# Patient Record
Sex: FEMALE | Race: WHITE
[De-identification: names, ages, dates, MRNs, and addresses within clinical notes are randomized per-mention and may not be internally consistent; named-entity substitution may affect disease eponyms.]

---

## 2022-09-28 ENCOUNTER — HOSPITAL ENCOUNTER (EMERGENCY)
Dept: HOSPITAL 97 - ER | Age: 87
Discharge: ANOTHER HOSPITAL | End: 2022-09-28
Payer: COMMERCIAL

## 2022-09-28 DIAGNOSIS — Z20.822: ICD-10-CM

## 2022-09-28 DIAGNOSIS — S42.352A: Primary | ICD-10-CM

## 2022-09-28 LAB
BUN BLD-MCNC: 23 MG/DL (ref 7–18)
GLUCOSE SERPLBLD-MCNC: 190 MG/DL (ref 74–106)
HCT VFR BLD CALC: 35.4 % (ref 36–45)
INR BLD: 1.07
LYMPHOCYTES # SPEC AUTO: 1.1 K/UL (ref 0.7–4.9)
MCV RBC: 94.7 FL (ref 80–100)
PMV BLD: 7.3 FL (ref 7.6–11.3)
POTASSIUM SERPL-SCNC: 3.9 MMOL/L (ref 3.5–5.1)
RBC # BLD: 3.73 M/UL (ref 3.86–4.86)

## 2022-09-28 PROCEDURE — 99285 EMERGENCY DEPT VISIT HI MDM: CPT

## 2022-09-28 PROCEDURE — 87811 SARS-COV-2 COVID19 W/OPTIC: CPT

## 2022-09-28 PROCEDURE — 85610 PROTHROMBIN TIME: CPT

## 2022-09-28 PROCEDURE — 73060 X-RAY EXAM OF HUMERUS: CPT

## 2022-09-28 PROCEDURE — 80048 BASIC METABOLIC PNL TOTAL CA: CPT

## 2022-09-28 PROCEDURE — 85025 COMPLETE CBC W/AUTO DIFF WBC: CPT

## 2022-09-28 PROCEDURE — 36415 COLL VENOUS BLD VENIPUNCTURE: CPT

## 2022-09-28 PROCEDURE — 85730 THROMBOPLASTIN TIME PARTIAL: CPT

## 2022-09-28 NOTE — XMS REPORT
Continuity of Care Document

                          Created on:2022



Patient:LY FRANCIS

Sex:Female

:1935

External Reference #:[id number]





Demographics







                          Address                   -



                                                    [place] [address]

 

                          Home Phone                [phone]

 

                          Work Phone                [phone]

 

                          Email Address             NONE

 

                          Preferred Language        English

 

                          Marital Status            Unknown

 

                          Judaism Affiliation     Unknown

 

                          Race                      Unknown

 

                          Additional Race(s)        White

 

                          Ethnic Group              Unknown









Author







                          Organization              [facility]

t

 

                          Address                   [address]



                                                    [place] [address]

 

                          Phone                     [phone]









Support







                Name            Relationship    Address         Phone

 

                MORGAN FRANCIS                 UNK             [phone]



                                                [place] [address] 

 

                MORGAN FRANCIS                 [address] [phone]



                                                [place] [address] 

 

                BOBBI FRANCIS     OtherRelationship Unavailable     Unavailable

 

                PENNY FRANCIS                    [phone]



                                                [place] [address] 

 

                MORGAN FRANCIS   Unavailable     [address] [facility] [phone]



                                                [place] [address] 









Care Team Providers







                    Name                Role                Phone

 

                    DR ROSA LONGORIA Primary Care Physician Unavailable

 

                    Roxane Julian      Attending Clinician Unavailable

 

                    SARI PINTO         Attending Clinician Unavailable

 

                    EDWIN VELEZ      Attending Clinician Unavailable

 

                    STACY CORBETT     Attending Clinician Unavailable

 

                    Sandoval Landin     Attending Clinician Unavailable

 

                    Arnulfo Gant  Attending Clinician Unavailable

 

                    DIOGO VANEGAS           Attending Clinician Unavailable

 

                    Octavio Gilliam    Attending Clinician Unavailable

 

                    Roxane Julian      Admitting Clinician Unavailable

 

                    ELAINE STALLWORTH       Admitting Clinician Unavailable

 

                    Physician, No Primary or Family Admitting Clinician UnavailDIOGO Haynes           Admitting Clinician Unavailable

 

                    Alberto Salomon PA-C Admitting Clinician Unavailable









Payers







           Payer Name Policy Type Policy Number Effective Date Expiration Date S

brenda

 

           [id number]                [id number]  1960 00:00:00            







Problems







       Condition Condition Condition Status Onset  Resolution Last   Treating Co

mments 

Source



       Name   Details Category        Date   Date   Treatment Clinician        



                                                 Date                 

 

       Acute  Acute  Diagnosis Active 2021                             [facility]



       Confusion Confusion               0-25                               [facility]

s



                                   00:00:                             [facility]



                                   00                                 l



                                                                      (LUF/LI



                                                                      V/SA)







Allergies, Adverse Reactions, Alerts







       Allergy Allergy Status Severity Reaction(s) Onset  Inactive Treating Comm

ents 

Source



       Name   Type                        Date   Date   Clinician        

 

       No Known DA     Active U             2021                      [facility]



       Allergie                             2-13                        [facility]



       s                                  00:00:                      d



                                          00                          Medical



                                                                      Center

 

       No Known DA     Active U             -                      [facility]



       Allergie                             0-03                        [place]



       s                                  00:00:                      d



                                          00                          Medical



                                                                      Center

 

       No Known DA     Active U             -                      [facility]



       Allergie                             0-03                        [place]



       s                                  00:00:                      d



                                          00                          Medical



                                                                      Center

 

       Sulfa  DA     Active MI            -                      [facility]



       (Sulfona                             0-19                        [facility]



       mide                               00:00:                      d



       Antibiot                             00                          Medical



       ics)                                                           Center

 

       Sulfa  DA     Active MI            2010                      [facility]



       (Sulfona                             0-19                        [facility]



       mide                               00:00:                      d



       Antibiot                             00                          Medical



       ics)                                                           Center

 

       CELEBREX DA     Active U             -0                      [facility]



                                          5-                        [place]



                                          00:00:                      d



                                          00                          Medical



                                                                      Center

 

       IVP DYE DA     Active U             -0                      [facility]



                                          5-                        [place]



                                          00:00:                      d



                                          00                          Medical



                                                                      Center

 

       No Known DA     Active U             -0                      [facility]



       Food                               5-                        [facility]



       Allergie                             00:00:                      d



       s                                  00                          Medical



                                                                      Center

 

       No Known DA     Active U             -0                      [facility]



       Other                              5-                        [facility]



       Allergie                             00:00:                      d



       s                                  00                          Medical



                                                                      Center

 

       VIOXX  DA     Active U             -0                      [facility]



                                          5-21                        [facility]



                                          00:00:                      d



                                          00                          Medical



                                                                      Center







Social History







           Social Habit Start Date Stop Date  Quantity   Comments   Source

 

           Sex Assigned At 1935                       [facility]



           Birth      00:00:00   00:00:00                         









                Smoking Status  Start Date      Stop Date       Source

 

                Tobacco smoking consumption                                 [facility]



                unknown                                         

 

                [facility]                                   [facility]

[facility]



                                                                (LUF/GENOVEVA/SA)







Medications

This patient has no known medications.



Immunizations







           Ordered Immunization Filled Immunization Date       Status     Commen

ts   Source



           Name       Name                                        

 

           PFIZER COVID-19 MRNA            2021 Completed             [facility]

[facility]



           VACCINATION            00:00:00                         Hospital

 

           PFIZER COVID-19 MRNA            2021 Completed             [facility]

[facility]



           VACCINATION            00:00:00                         Hospital







Vital Signs







             Vital Name   Observation Time Observation Value Comments     Source

 

             Height       2021-10-25 17:38:00 175.26 CM                 

 

             Weight       2021-10-25 17:38:00 61.23 KG                  

 

             BP Systolic  2021-10-25 17:38:00 161 mm[Hg]                [facility]



                                                                 (LUF/GENOVEVA/SA)

 

             BP Diastolic 2021-10-25 17:38:00 91 mm[Hg]                 [facility]



                                                                 (LUF/GENOVEVA/SA)

 

             Height       2021-10-25 17:38:00 69 [in_i]                 [facility]



                                                                 (LUF/GENOVEVA/SA)

 

             Weight       2021-10-25 17:38:00 135 [lb_av]               [facility]



                                                                 (LUF/GENOVEVA/SA)

 

             BMI (Body Mass Index) 2021-10-25 17:38:00 19.9 kg/m2               

 [facility]



                                                                 (LUF/GENOVEVA/SA)

 

             Body Temperature 2021-10-25 17:38:00 98.2 [degF]               [facility]



                                                                 (LUF/GENOVEVA/SA)

 

             Pulse Rate   2021-10-25 17:38:00 97 /min                   [facility]



                                                                 (F/GENOVEVA/SA)

 

             Respiratory Rate 2021-10-25 17:38:00 20 /min                   [facility]



                                                                 (F/GENOVEVA/SA)

 

             O2% BldC Oximetry 2021-10-25 17:38:00 95 %                      [facility]



                                                                 (F/GENOVEVA/SA)







Procedures







                Procedure       Date / Time Performed Performing Clinician [facility]

ivon

 

                [id number]         2020-10-04 00:00:00 EVERT           [facility]







Plan of Care







             Planned Activity Planned Date Details      Comments     Source

 

             Future Scheduled 2022   INFLUENZA VACCINE              [facility]

[facility]



             Test         02:31:10     [code = INFLUENZA              



                                       VACCINE]                  

 

             Future Scheduled 2022   HEPATITIS B VACCINES              Met

[facility]



             Test         02:31:10     (1 of 3 - 3-dose              



                                       series) [code =              



                                       HEPATITIS B VACCINES              



                                       (1 of 3 - 3-dose              



                                       series)]                  

 

             Future Scheduled 2022   SHINGLES VACCINES (1              Met

[facility]



             Test         02:31:10     of 2) [code = SHINGLES              



                                       VACCINES (1 of 2)]              

 

             Future Scheduled 2022   65+ [facility]OCOCCAL              [facility]



             Test         02:31:10     VACCINE (1 - PCV)              



                                       [code = 65+               



                                       PNEUMOCOCCAL VACCINE              



                                       (1 - PCV)]                

 

             Future Scheduled 2022   COVID-19 VACCINE (3 -              [facility]

[facility]



             Test         02:31:10     Booster for Pfizer              



                                       series) [code =              



                                       COVID-19 VACCINE (3 -              



                                       Booster for Pfizer              



                                       series)]                  







Encounters







        Start   End     Encounter Admission Attending Care    Care    Encounter 

Source



        Date/Time Date/Time Type    Type    Clinicians Facility Department ID   

   

 

        2020-10-04         Inpatient EM      MURALI Julian     [id number] 

[facility]



        00:56:00                         Roxanesaundra Mullins      [facility]

 

        2022 Inpatient E       SARI PINTO TW    MED     [id number]

    T



        19:34:00 17:20:00                                                 

 

        2022 Emergency E       AGUSTIN, [facility]    [facility]    7501  

  [facility]



        15:04:00 17:27:00                 EDWIN                            

 

        2022 2022-03-10 Outpatient         ARIC, [facility]    [facility]    [id number]  

  [facility]



        14:10:00 23:59:00                 STACY                            

 

        2022 Outpatient         ARIC, [facility]    [facility]    9401  

  [facility]



        10:56:00 23:59:00                 [facility]                            

 

        2021 Outpatient         ARIC, [facility]    [facility]    9400  

  [facility]



        09:28:00 18:00:00                 [facility]                            

 

        2021-12-15 2021-12-15 Emergency EM      Urvashi, HCAKW   CHLOE    [id number]

94 [facility]



        17:28:00 19:27:00                 [address]

 

        2021-12-15 2021-12-15 Emergency EM      Urvashi, HCAKW   [facility]    [id number]

94 [facility]



        17:28:00 19:27:00                 [address]

 

        2021 Emergency EM      [facility]   [facility]    [id number]

[id number] [facility]



        13:10:00 19:00:00                                         29      [facility]

 

        2021 Emergency EM      Stalin[facility]ulfo [facility]   [facility]    02

[id number] [facility]



        13:10:00 19:00:00                                         29      [facility]

 

        2021-10-25 2021-10-25 DISORIENTA 1       [facility]     [id number]

[address] [facility]



        17:37:00 18:56:00 TARSHAON                                            [facility]



                        UNSPECIFIE                                         [facility]

ia



                        D                                               l



                                                                        (ARLENE/LAURA



                                                                        V/SA)

 

        2021-10-25 2021-10-25 Inpatient                 [facility]     [id number]

-[facility]



        00:00:00 00:00:00                         [facility] [id number]-

a Marcos [address]ON[address], [address]         [id number] [facility]

ia



                                                [address]          [id number]  l



                                                [place],                 (LUF/LI



                                                [place]                 V/SA)



                                                [place]                   



                                                [address]                   

 

        2021-10-25 2021-10-25 [facility]patient                 [facility]     [id number]

-[facility]



        00:00:00 00:00:00                         DON CORLEY [id number]

[id number] [place]



                                                N, [address]         [id number] [facility]

ia



                                                [address]          [id number]  l



                                                [place],                 (LUF/LI



                                                [place]                 V/SA)



                                                [place], [place]                   



                                                [address]                   

 

        2021-10-25 2021-10-25 Inpatient                 [facility]     [id number]

-f [facility]



        00:00:00 00:00:00                         DON CORLEY [id number]

a [place]



                                                N, [address]         [id number] [facility]

ia



                                                [address]          [id number]  l



                                                [place],                 (LUF/LI



                                                [place]                 V/SA)



                                                [place], [place]                   



                                                [address]                   

 

        2021 Outpatient                 [facility]     [id number]

[address]



        00:00:00 00:00:00                                         682     [facility]

i



                                                                        st

 

        2021 Outpatient                 [facility]     [id number]

[address]



        00:00:00 00:00:00                                         660     [facility]

i



                                                                        st

 

        2020 Outpatient PAM Gilliam MURALI   RADI    [id number]

[id number] [facility]



        11:15:00 11:15:00                 Octavio Monsivais      [facility]







Results







           Test Description Test Time  Test Comments Results    Result     Sour

e



                                                       Comments   

 

           - XR WRIST 2 VIEWS 2021            **********************      

      



           RT         17:39:00              **********************            



                                            [facility] : 1935            



                                            Sex:                  



                                            F*********************            



                                            **********************            



                                            ***************** FAX:            



                                            Lashell Boswell MD            



                                            [phone] [place]:            



                                             St:                



                                            REG-------------------            



                                            ----------------------            



                                            ----------------------            



                                            ---------------- Name:            



                                            LY FRANCIS :            



                                            1935 Age/S: 86/F            



                                            [address]            



                                            [id number] Loc: MARGARET            



                                            [place] [address]            



                                            Phys: Lashell Boswell MD            



                                            Acct: [id number] Dis            



                                            Date: Status: REG ER            



                                            PHONE #: [phone]            



                                            Exam Date: 2021 FAX #:            



                                            [phone] Reason:            



                                            POST REDUCTION EXAMS:            



                                            CPT CODE: [id number] XR            



                                            WRIST 2 [place]IEWS [place] [address]            



                                            Location: A1 EXAM: -            



                                            XR WRIST 2 VIEWS RT            



                                            INDICATION: POST            



                                            REDUCTION COMPARISON:            



                                            Wrist radiograph dated            



                                            the same day            



                                            TECHNIQUE: 2 views of            



                                            the right wrist            



                                            FINDINGS: Slight            



                                            interval improvement            



                                            in alignment of an            



                                            angulated, comminuted,            



                                            and displaced fracture            



                                            of the distal radius            



                                            status post closed            



                                            reduction, with some            



                                            mild persistent            



                                            angulation and            



                                            displacement.            



                                            Unchanged ulnar            



                                            styloid process            



                                            fracture. No            



                                            distinctly new acute            



                                            fracture.  There is            



                                            soft tissue swelling            



                                            of the wrist.            



                                            IMPRESSION: Slight            



                                            interval improvement            



                                            in alignment. **            



                                            Electronically Signed            



                                            by Federico Shirley MD            



                                            ** ** on 2021 at            



                                            1739 ** Reported and            



                                            signed by:            



                                            Federico Shirley MD CC:            



                                            Lashell Boswell MD            



                                            Technologist: Alexandru Walton            



                                            Date/Time/By:            



                                            2021 (7485) :            



                                            By: [email or web address] PAGE 1            



                                            Signed Report FAX:            



                                            Lashell Boswell MD            



                                            [phone] [place]:            



                                             St:                



                                            REG-------------------            



                                            ----------------------            



                                            ----------------------            



                                            ---------------- Name:            



                                            LY FRANCIS :            



                                            1935 Age/S: 86/F            



                                            [address]:            



                                            [id number] [place] [address]            



                                            Phys: Lashell Boswell MD            



                                            Acct: [id number] Dis            



                                            Date: Status: REG ER            



                                            PHONE #: [phone]            



                                            Exam Date: 2021            



                                            1730 FAX #:            



                                            [phone] Reason:            



                                            POST REDUCTION  EXAMS:            



                                            CPT CODE: [id number] XR            



                                            WRIST 2 [place] [address]            



                                            <Continued&gt; Orig            



                                            Print D/T: S:            



                                            2021 (0226) PAGE            



                                            2 Signed Report            

 

           - XR WRIST 2 VIEWS 2021            **********************      

      



           RT         17:39:00              **********************            



                                            [facility] 1935            



                                            Sex:                  



                                            F*********************            



                                            **********************            



                                            ***************** FAX:            



                                            Lashell Boswell MD            



                                            [phone] [place]:            



                                             St:                



                                            MEGHA-------------------            



                                            ----------------------            



                                            ----------------------            



                                            ---------------- Name:            



                                            LY FRANCIS  :            



                                            1935 Age/S: 86/F            



                                            [address]:            



                                            [id number] [place]: [place] [address]            



                                            Phys: Lashell Boswell MD            



                                            Acct: [id number] Dis            



                                            Date: Status: DEP ER            



                                            PHONE #: [phone]            



                                            Exam Date: 2021 FAX #:            



                                            [phone] Reason:            



                                            POST REDUCTION EXAMS:            



                                            CPT CODE: [id number] XR            



                                            WRIST 2 VIEWS RT            



                                            [address] Location: A1            



                                            EXAM: - XR WRIST 2            



                                            VIEWS RT INDICATION:            



                                            POST REDUCTION            



                                            COMPARISON: Wrist            



                                            radiograph dated the            



                                            same day  TECHNIQUE: 2            



                                            views of the right            



                                            wrist FINDINGS: Slight            



                                            interval improvement            



                                            in alignment of an            



                                            angulated, comminuted,            



                                            and displaced fracture            



                                            of the distal radius            



                                            status post closed            



                                            reduction, with some            



                                            mild persistent            



                                            angulation and            



                                            displacement.            



                                            Unchanged ulnar            



                                            styloid process            



                                            fracture. No            



                                            distinctly new acute            



                                            fracture. There is            



                                            soft tissue swelling            



                                            of the wrist.            



                                            IMPRESSION: Slight            



                                            interval improvement            



                                            in alignment. **            



                                            Electronically Signed            



                                            by Federico Shirley MD            



                                            ** ** on 2021 at            



                                            9468 ** Reported and            



                                            signed by:            



                                            Federico Shirley MD CC:            



                                            Lashell Boswell MD            



                                            Technologist: Alexandru Odonnellsclucina            



                                            Date/Time/By:            



                                            2021 (0971) :            



                                            By: [email or web address] PAGE 1            



                                            Signed Report FAX:            



                                            Lashell Boswell MD            



                                            [phone] [place]:            



                                             St:                



                                            DEP-------------------            



                                            ----------------------            



                                            ----------------------            



                                            ---------------- Name:            



                                            [facility]            



                                            [place] :            



                                            1935 Age/S: 86/F            



                                            [address]:            



                                            [id number] Loc: [place]            



                                            [place] [address]            



                                            Phys: Lashell Boswell MD            



                                            Acct: [id number] Dis            



                                            Date: Status: [facility] ER            



                                            PHONE #: [phone]            



                                            Exam Date: 2021 FAX #:            



                                            [phone] Reason:            



                                            POST REDUCTION  EXAMS:            



                                            CPT CODE: [id number] XR            



                                            WRIST 2 VIEWS RT [address]            



                                            (Continued) Orig Print            



                                            D/T: S: 2021            



                                            (8496) PAGE 2 Signed            



                                            Report                

 

           - XR HIP BI 2021            **********************            



           W/PELVIS   15:33:00              **********************            



                                            [facility] : 1935            



                                            Sex:                  



                                            F*********************            



                                            **********************            



                                            ***************** FAX:            



                                            Lashell Boswell MD            



                                            [phone] [place]:            



                                             St: PRE FAX:            



                                            James Rueda DO            



                                            ----------------------            



                                            ----------------------            



                                            ----------------------            



                                            ------------- Name:            



                                            LY FRANCIS  [facility] :            



                                            1935 Age/S: 86/F            



                                            [address]:            



                                            [id number] Loc: [place]            



                                            [place] [address]            



                                            Phys: MohitJames            



                                             R2 Acct:            



                                            [id number] Dis Date:            



                                            Status: PRE ER PHONE            



                                            #: [phone] Exam            



                                            Date: 2021 1459            



                                            FAX #: [phone]            



                                            Reason: fall/head            



                                            stiek/ neck pain/pain            



                                            pain/hip pain/ EXAMS:            



                                            CPT CODE:  [id number]            



                                            XR HIP BI W/PELVIS            



                                            98857 EXAM: - XR HIP            



                                            BI W/PELVIS HISTORY:            



                                            fall/head/neck            



                                            pain/pain pain/hip            



                                            pain/ knee pain            



                                            COMPARISON: None            



                                            available time of            



                                            interpretation.            



                                            FINDINGS: AP and            



                                            frog-leg lateral views            



                                            of both hips and            



                                            single view of the            



                                            pelvis are provided.            



                                            Intact right hip            



                                            hemiarthroplasty. No            



                                            significant            



                                            degenerative changes            



                                            of the left hip joint.            



                                            No acute fracture.            



                                            IMPRESSION: No acute            



                                            findings. **            



                                            Electronically Signed            



                                            by Gonzalo Wilson MD on            



                                            2021 at 1533 **            



                                            Reported and signed            



                                            by: Gonzalo Wilson MD            



                                            CC: Lashell Boswell MD;            



                                            James Rueda DO            



                                            Technologist: Alexandru Walton            



                                            Date/Time/By:            



                                            2021 (4713) :            



                                            By: [email or web address] PAGE 1            



                                            Signed Report FAX:            



                                            Lashell Boswell MD            



                                            [phone] [place]:            



                                             St: PRE FAX:            



                                            James Rueda DO            



                                            ----------------------            



                                            ----------------------            



                                            ----------------------            



                                            ------------- Name:            



                                            LY FRANCIS [facility]  :            



                                            1935 Age/S: 86/F            



                                            [address]:            



                                            [id number] Loc: [place]            



                                            [place] [address]            



                                            Phys: James Rueda  Acct:            



                                            [id number] Dis Date:            



                                            Status: PRE ER PHONE            



                                            #: [phone] Exam            



                                            Date: 2021 1459            



                                            FAX #: [phone]            



                                            Reason: fall/head            



                                            stiek/ neck pain/pain            



                                            pain/hip pain/ EXAMS:            



                                            CPT CODE: [id number] XR            



                                            HIP BI W/PELVIS  44701            



                                            <Continued> Orig Print            



                                            D/T: S: 2021            



                                            (8033) PAGE 2 Signed            



                                            Report                

 

           - XR HIP BI 2021            **********************            



           W/PELVIS   15:33:00              **********************            



                                            [facility] : 1935            



                                            Sex:                  



                                            F*********************            



                                            **********************            



                                            ***************** FAX:            



                                            Lashell oBswell MD            



                                            [phone] [place]:            



                                            [facility]: [facility] FAX:            



                                            James Rueda DO            



                                            ----------------------            



                                            ----------------------            



                                            ----------------------            



                                            ------------- Name:            



                                            LY FRANCIS :            



                                            1935 Age/S: 86/F            



                                            [address]:            



                                            [id number] Loc: [place]            



                                            [place] [address]            



                                            Phys: James Rueda DO  Acct:            



                                            [id number] Dis Date:            



                                            Status: DEP ER  PHONE            



                                            #: [phone] Exam            



                                            Date: 2021 1456            



                                            FAX #: [phone]            



                                            Reason: fall/head            



                                            stiek/ neck pain/pain            



                                            pain/hip pain/ EXAMS:            



                                            CPT CODE: [id number] XR            



                                            HIP BI W/PELVIS 71538            



                                            EXAM: - XR HIP BI            



                                            W/PELVIS HISTORY:            



                                            fall/head/neck            



                                            pain/pain pain/hip            



                                            pain/ knee pain            



                                            COMPARISON: None            



                                            available time of            



                                            interpretation.            



                                            FINDINGS: AP and            



                                            frog-leg lateral views            



                                            of both hips and            



                                            single view of the            



                                            pelvis are provided.            



                                            Intact right hip            



                                            hemiarthroplasty. No            



                                            significant            



                                            degenerative changes            



                                            of the left hip joint.            



                                            No acute fracture.            



                                            IMPRESSION: No acute            



                                            findings. **            



                                            Electronically Signed            



                                            by Gonzalo Wilson MD on            



                                            2021 at 1533 **            



                                            Reported and signed            



                                            by: Gonzalo Wilson MD            



                                            CC: Lashell Boswell MD;            



                                            James Rueda DO            



                                            Technologist: Alexandru Walton            



                                            Date/Time/By:            



                                            2021 (1533) :            



                                            By: [email or web address] PAGE 1            



                                            Signed Report FAX:            



                                            Lashell Boswell MD            



                                            [phone] [place]:            



                                            [facility]: [facility] FAX:            



                                            James Rueda DO            



                                            ----------------------            



                                            ----------------------            



                                            ----------------------            



                                            ------------- Name:            



                                            LY FRANCIS :            



                                            1935 Age/S: 86/F            



                                            [address]:            



                                            [id number] [place]: [place] [address]            



                                            Phys: James Rueda DO R2 Acct:            



                                            [id number] Dis Date:            



                                            Status: DEP ER PHONE            



                                            #: [phone] Exam            



                                            Date: 2021 1459            



                                            FAX #: [phone]            



                                            Reason: fall/head            



                                            stiek/ neck pain/pain            



                                            pain/hip pain/ EXAMS:            



                                            CPT CODE: [id number] XR            



                                            HIP BI W/PELVIS [address]            



                                            (Continued) Orig Print            



                                            D/T: S: 2021            



                                            (1536)  PAGE 2 Signed            



                                            Report                

 

           - XR HAND 3 + V RT 2021            **********************      

      



                      15:31:00              **********************            



                                            [facility] 1935            



                                            Sex:                  



                                            F*********************            



                                            **********************            



                                            ***************** FAX:            



                                            Lashell Boswell MD            



                                            [phone] [place]:            



                                             St: PRE FAX:            



                                            James Rueda DO            



                                            ----------------------            



                                            ----------------------            



                                            ----------------------            



                                            ------------- Name:            



                                            LY FRANCIS :            



                                            1935 Age/S: 86/F            



                                            [address]            



                                            [id number] Loc: [place]            



                                            [place] [address]            



                                            Phys: James Rueda Acct:            



                                            [id number] Dis Date:            



                                            Status: PRE ER PHONE            



                                            #: [phone] Exam            



                                            Date: 2021            



                                            FAX #: [phone]            



                                            Reason: fall/head            



                                            stiek/ neck pain/pain            



                                            pain/hip pain/ EXAMS:            



                                            CPT CODE: [id number] XR            



                                            HAND 3 + V RT 33184            



                                            EXAM: - XR WRIST 3 + V            



                                            RT, - XR FOREARM 2            



                                            VIEWS RT, - XR HAND 3            



                                            + V RT HISTORY:            



                                            fall/head/neck            



                                            pain/pain pain/hip            



                                            pain/ knee pain            



                                            COMPARISON: None            



                                            available time of            



                                            interpretation.            



                                            FINDINGS: Frontal,            



                                            oblique, and lateral            



                                            views of the right            



                                            wrist and hand are            



                                            provided. 2 views of            



                                            the right forearm.            



                                            Comminuted,            



                                            intra-articular            



                                            displaced fracture of            



                                            the distal radius.            



                                            Soft tissue swelling            



                                            surrounds the fracture            



                                            site. IMPRESSION:            



                                            Distal radial fracture            



                                            as above. **            



                                            Electronically Signed            



                                            by Gonzalo Wilson MD on            



                                            2021 at 1531 **            



                                            Reported and signed            



                                            by: Gonzalo Wilson MD            



                                            CC: Lashell Boswell MD;            



                                            [facility]             



                                            Technologist: Alexandru Benavides Trnscrd            



                                            Date/Time/By:            



                                            2021 (1531) :            



                                            By: [email or web address] PAGE 1            



                                            Signed Report FAX:            



                                            Lashell Boswell MD            



                                            [phone] [place]:            



                                             St: PRE FAX:            



                                            James Rueda DO            



                                            ----------------------            



                                            ----------------------            



                                            ----------------------            



                                            ------------- Name:            



                                            [place]  :            



                                            1935 Age/S: 86/F            



                                            [address]            



                                            [id number] Loc: [place]            



                                            [place] [address]            



                                            Phys: James Rueda  Acct:            



                                            [id number] Dis Date:            



                                            Status: PRE ER PHONE            



                                            #: [phone] Exam            



                                            Date: 2021 145            



                                            FAX #: [phone]            



                                            Reason: fall/head            



                                            stiek/ neck pain/pain            



                                            pain/hip pain/ EXAMS:            



                                            CPT CODE: [id number] XR            



                                            HAND 3 + V RT  33842            



                                            <Continued> Orig Print            



                                            D/T: S: 2021            



                                            (8634) PAGE 2 Signed            



                                            Report                

 

           - XR FOREARM 2 2021            **********************          

  



           VIEWS RT   15:31:00              **********************            



                                            [facility] : 1935            



                                            Sex:                  



                                            F*********************            



                                            **********************            



                                            ***************** FAX:            



                                            Lashell Boswell MD            



                                            [phone] [place]:            



                                             St: PRE FAX:            



                                            James Rueda DO            



                                            ----------------------            



                                            ----------------------            



                                            ----------------------            



                                            ------------- Name:            



                                            LY FRANCIS [facility] :            



                                            1935 Age/S: 86/F            



                                            [address]:            



                                            [id number]  Loc: [place]            



                                            [place] [address]            



                                            Phys: James Rueda DO R2 Acct:            



                                            [id number] Dis Date:            



                                            Status: PRE ER  PHONE            



                                            #: [phone] Exam            



                                            Date: 2021 1459            



                                            FAX #: [phone]            



                                            Reason: fall/head            



                                            stiek/ neck pain/pain            



                                            pain/hip pain/ EXAMS:            



                                            CPT CODE: [id number] XR            



                                            FOREARM 2 VIEWS RT            



                                            53579 EXAM: - XR WRIST            



                                            3 + V RT, - XR FOREARM            



                                            2 VIEWS RT, - XR HAND            



                                            3 + V RT HISTORY:            



                                            fall/head/neck            



                                            pain/pain pain/hip            



                                            pain/ knee pain            



                                            COMPARISON: None            



                                            available time of            



                                            interpretation.            



                                            FINDINGS: Frontal,            



                                            oblique, and lateral            



                                            views of the right            



                                            wrist and hand are            



                                            provided. 2 views of            



                                            the right forearm.            



                                            Comminuted,            



                                            intra-articular            



                                            displaced fracture of            



                                            the distal radius.            



                                            Soft tissue swelling            



                                            surrounds the fracture            



                                            site. IMPRESSION:            



                                            Distal radial fracture            



                                            as above. **            



                                            Electronically Signed            



                                            by Gonzalo Wilson MD on            



                                            2021 at 1531 **            



                                            Reported and signed            



                                            by: Gonzalo Wilson MD            



                                            CC: Lashell Boswell MD;            



                                            James Rueda DO            



                                            Technologist: Alexandru Benavides Trnscrd            



                                            Date/Time/By:            



                                            2021 () :            



                                            By: [email or web address] PAGE 1            



                                            Signed Report FAX:            



                                            Lashell Boswell MD            



                                            [phone] [place]:            



                                             St: PRE FAX:            



                                            James Rueda DO            



                                            ----------------------            



                                            ----------------------            



                                            ----------------------            



                                            ------------- Name:            



                                            LY FRANCIS :            



                                            1935 Age/S: 86/F            



                                            [address]:            



                                            [id number] Loc: [place]            



                                            [place] [address]            



                                            Phys: James Rueda DO R2 Acct:            



                                            [id number] Dis Date:            



                                            Status: PRE ER PHONE            



                                            #: [phone] Exam            



                                            Date: 2021 1459            



                                            FAX #: [phone]            



                                            Reason: fall/head            



                                            stiek/ neck pain/pain            



                                            pain/hip pain/ EXAMS:            



                                            CPT CODE: [id number] XR            



                                            FOREARM 2 VIEWS RT            



                                            02368 <Continued> Orig            



                                            Print D/T: S:            



                                            2021 (6777) PAGE            



                                            2 Signed Report            

 

           - XR WRIST 3 + V 2021            **********************        

    



           RT         15:31:00              **********************            



                                            [facility] : 1935            



                                            Sex:                  



                                            F*********************            



                                            **********************            



                                            ***************** FAX:            



                                            Lashell Boswell MD            



                                            [phone] [place]:            



                                             St: PRE FAX:            



                                            James Rueda DO            



                                            ----------------------            



                                            ----------------------            



                                            ----------------------            



                                            ------------- Name:            



                                            LY FRANCIS  [facility] :            



                                            1935 Age/S: 86/F            



                                            [address]:            



                                            [id number] Loc: [place]            



                                            [place] [address]            



                                            Phys: James Rueda DO R2 Acct:            



                                            [id number] Dis Date:            



                                            Status: PRE ER PHONE            



                                            #: [phone] Exam            



                                            Date: 2021 1459            



                                            FAX #: [phone]            



                                            Reason: fall/head            



                                            stiek/ neck pain/pain            



                                            pain/hip pain/ EXAMS:            



                                            CPT CODE:  [id number]            



                                            XR WRIST 3 + V RT            



                                            37423 EXAM: - XR WRIST            



                                            3 + V RT, - XR FOREARM            



                                            2 VIEWS RT, - XR HAND            



                                            3 + V RT HISTORY:            



                                            fall/head/neck            



                                            pain/pain pain/hip            



                                            pain/ knee pain            



                                            COMPARISON: None            



                                            available time of            



                                            interpretation.            



                                            FINDINGS: Frontal,            



                                            oblique, and lateral            



                                            views of the right            



                                            wrist and hand are            



                                            provided. 2 views of            



                                            the right forearm.            



                                            Comminuted,            



                                            intra-articular            



                                            displaced fracture of            



                                            the distal radius.            



                                            Soft tissue swelling            



                                            surrounds the fracture            



                                            site.  IMPRESSION:            



                                            Distal radial fracture            



                                            as above. **            



                                            Electronically Signed            



                                            by Gonzalo Wilson MD on            



                                            2021 at 1531 **            



                                            Reported and signed            



                                            by: Gonzalo Wilson MD            



                                            CC: Lashell Boswell MD;            



                                            James Rueda DO            



                                            Technologist: Alexand[facility] Benavides [facility]            



                                            Date/Time/By:            



                                            2021 (9578) :            



                                            By: [email or web address] PAGE 1            



                                            Signed Report  FAX:            



                                            Lashell Boswell MD            



                                            [phone] [place]:            



                                            [facility]: PRE FAX:            



                                            James Rueda DO            



                                            ----------------------            



                                            ----------------------            



                                            ----------------------            



                                            ------------- Name:            



                                            LY FRANCIS [facility] :            



                                            1935 Age/S: 86/F            



                                            [address]:            



                                            [id number] Loc: [place]            



                                            [place] [address]            



                                            Phys: James Rueda DO R2 Acct:            



                                            [id number] Dis Date:            



                                            Status: PRE ER  PHONE            



                                            #: [phone] Exam            



                                            Date: 2021 1459            



                                            FAX #: [phone]            



                                            Reason: fall/head            



                                            stiek/ neck pain/pain            



                                            pain/hip pain/ EXAMS:            



                                            CPT CODE: [id number] XR            



                                            WRIST 3 + V RT 03495            



                                            <Continued> Orig Print            



                                            D/T: S: 2021            



                                            (0741)  PAGE 2 Signed            



                                            Report                

 

           - XR HAND 3 + V RT 2021            **********************      

      



                      15:31:00              **********************            



                                            [facility] : 1935            



                                            Sex:                  



                                            F*********************            



                                            **********************            



                                            ***************** FAX:            



                                            Lashell Boswell MD            



                                            [phone] [place]:            



                                            [facility]: DEP FAX:            



                                            James Rueda DO            



                                            ----------------------            



                                            ----------------------            



                                            ----------------------            



                                            ------------- Name:            



                                            LY FRANCIS :            



                                            1935 Age/S: 86/F            



                                            [address]:            



                                            [id number] Loc: [place]            



                                            [place] [address]            



                                            Phys: MohitJames            



                                             [place]  Acct:            



                                            [id number] Dis Date:            



                                            Status: DEP ER PHONE            



                                            #: [phone] Exam            



                                            Date: 2021 8208            



                                            FAX #: [phone]            



                                            Reason: fall/head            



                                            stiek/ neck pain/pain            



                                            pain/hip pain/ EXAMS:            



                                            CPT CODE: [id number] XR            



                                            HAND 3 + V RT  77560            



                                            EXAM: - XR WRIST 3 + V            



                                            RT, - XR FOREARM 2            



                                            VIEWS RT, - XR HAND 3            



                                            + V RT HISTORY:            



                                            fall/head/neck            



                                            pain/pain pain/hip            



                                            pain/ knee pain            



                                            COMPARISON: None            



                                            available time of            



                                            interpretation.            



                                            FINDINGS: Frontal,            



                                            oblique, and lateral            



                                            views of the right            



                                            wrist and hand are            



                                            provided. 2 views of            



                                            the right forearm.            



                                            Comminuted,            



                                            intra-articular            



                                            displaced fracture of            



                                            the distal radius.            



                                            Soft tissue swelling            



                                            surrounds the fracture            



                                            site. IMPRESSION:            



                                            Distal radial fracture            



                                            as above. **            



                                            Electronically Signed            



                                            by Gonzalo Wilson MD on            



                                            2021 at 1531 **            



                                            Reported and signed            



                                            by: Gonzalo Wilson MD            



                                            CC: Lashell Boswell MD;            



                                            James Rueda DO            



                                            Technologist: Alexandru Greenrd            



                                            Date/Time/By:            



                                            2021 (1531) :            



                                            By: [email or web address] PAGE 1            



                                            Signed Report FAX:            



                                            Lashell Boswell MD            



                                            [phone] [place]:            



                                             St: [facility] FAX:            



                                            James Rueda DO            



                                            ----------------------            



                                            ----------------------            



                                            ----------------------            



                                            ------------- Name:            



                                            LY FRANCIS :            



                                            1935 Age/S: 86/F            



                                            [address]:            



                                            [id number] Loc: [place] [address]            



                                            Phys: James Rueda Acct:            



                                            [id number] Dis Date:            



                                            Status: DEP ER PHONE            



                                            #: [phone] Exam            



                                            Date: 2021 145            



                                            FAX #: [phone]            



                                            Reason: fall/head            



                                            stiek/ neck pain/pain            



                                            pain/hip pain/ EXAMS:            



                                            CPT CODE: [id number] XR            



                                            HAND 3 + V RT 30868            



                                            (Continued) Orig Print            



                                            D/T: S: 2021            



                                            (9265)  PAGE 2 Signed            



                                            Report                

 

           - XR WRIST 3 + V 2021            **********************        

    



           RT         15:31:00              **********************            



                                            [facility] 1935            



                                            Sex:                  



                                            F*********************            



                                            **********************            



                                            ***************** FAX:            



                                            Lashell Boswell MD            



                                            [phone] [place]:            



                                            [facility]: [facility] FAX:            



                                            James Rueda DO            



                                            ----------------------            



                                            ----------------------            



                                            ----------------------            



                                            ------------- Name:            



                                            LY FRANCIS :            



                                            1935 Age/S: 86/F            



                                            [address]  [address]            



                                            #: [id number] Loc:            



                                            [place]            



                                            [address] Phys:            



                                            James Rueda            



                                            Acct: [id number] Dis            



                                            Date:  Status: [facility] ER            



                                            PHONE #: [phone]            



                                            Exam Date: 2021 FAX #:            



                                            [phone] Reason:            



                                            fall/head stiek/ neck            



                                            pain/pain pain/hip            



                                            pain/ EXAMS: CPT CODE:            



                                            [id number] XR WRIST 3 +            



                                            [place] [address] EXAM: - XR            



                                            WRIST 3 + V RT, - XR            



                                            FOREARM 2 VIEWS RT, -            



                                            XR HAND 3 + V RT            



                                            HISTORY:              



                                            fall/head/neck            



                                            pain/pain pain/hip            



                                            pain/ knee pain            



                                            COMPARISON: None            



                                            available time of            



                                            interpretation.            



                                            FINDINGS: Frontal,            



                                            oblique, and lateral            



                                            views of the right            



                                            wrist and hand are            



                                            provided. 2 views of            



                                            the right forearm.            



                                            Comminuted,            



                                            intra-articular            



                                            displaced fracture of            



                                            the distal radius.            



                                            Soft tissue swelling            



                                            surrounds the fracture            



                                            site. IMPRESSION:            



                                            Distal radial fracture            



                                            as above. **            



                                            Electronically Signed            



                                            by Gonzalo Wilson MD on            



                                            2021 at 1531 **            



                                            Reported and signed            



                                            by: Gonzalo Wilson MD            



                                            CC: Lashell Boswell MD;            



                                            James Rueda DO            



                                            Technologist: Alexandru Benavides Trnscrd            



                                            Date/Time/By:            



                                            2021 (1531) :            



                                            By: [email or web address] PAGE 1            



                                            Signed Report FAX:            



                                            Lashell Boswell MD            



                                            [phone] [place]:            



                                             St: [facility] FAX:            



                                            MohitJames             



                                            ----------------------            



                                            ----------------------            



                                            ----------------------            



                                            ------------- Name:            



                                            LY FRANCIS :            



                                            1935 Age/S: 86/F            



                                            [address]:            



                                            [id number] Loc: [place]            



                                            [place] [address]            



                                            Phys: MohitJames            



                                            DO [place]  Acct:            



                                            [id number] Dis Date:            



                                            Status: [facility] ER PHONE            



                                            #: [phone] Exam            



                                            Date: 2021 1452            



                                            FAX #: [phone]            



                                            Reason: fall/head            



                                            stiek/ neck pain/pain            



                                            pain/hip pain/ EXAMS:            



                                            CPT CODE: [id number] XR            



                                            WRIST 3 + V RT 48134            



                                            (Continued) Orig Print            



                                            D/T: S: 2021            



                                            (8118) PAGE 2 Signed            



                                            Report                

 

           - XR FOREARM 2 2021            **********************          

  



           VIEWS RT   15:31:00              **********************            



                                            [facility] : 1935            



                                            Sex:                  



                                            F*********************            



                                            **********************            



                                            ***************** FAX:            



                                            Lashell Boswell MD            



                                            [phone] [place]:            



                                             St: [facility] FAX:            



                                            James Rueda DO            



                                            ----------------------            



                                            ----------------------            



                                            ----------------------            



                                            ------------- Name:            



                                            LY FRANCIS [facility] :            



                                            1935 Age/S: 86/F            



                                            [address]:            



                                            [id number] Loc: [place] [address]            



                                            Phys: James Rueda DO  Acct:            



                                            [id number] Dis Date:            



                                            Status: DEP ER PHONE            



                                            #: [phone] Exam            



                                            Date: 2021 0535            



                                            FAX #: [phone]            



                                            Reason: fall/head            



                                            stiek/ neck pain/pain            



                                            pain/hip pain/ EXAMS:            



                                            CPT CODE: [id number] XR            



                                            FOREARM 2 VIEWS RT            



                                            21323 EXAM: - XR WRIST            



                                            3 + V RT, - XR FOREARM            



                                            2 VIEWS RT, - XR HAND            



                                            3 + V RT  HISTORY:            



                                            fall/head/neck            



                                            pain/pain pain/hip            



                                            pain/ knee pain            



                                            COMPARISON: None            



                                            available time of            



                                            interpretation.            



                                            FINDINGS: Frontal,            



                                            oblique, and lateral            



                                            views of the right            



                                            wrist and hand are            



                                            provided. 2 views of            



                                            the right forearm.            



                                            Comminuted,            



                                            intra-articular            



                                            displaced fracture of            



                                            the distal radius.            



                                            Soft tissue swelling            



                                            surrounds the fracture            



                                            site. IMPRESSION:            



                                            Distal radial fracture            



                                            as above. **            



                                            Electronically Signed            



                                            by Gonzalo Wilson MD on            



                                            2021 at 1531 **            



                                            Reported and signed            



                                            by: Gonzalo Wilson MD            



                                            CC: Lashell Boswell MD;            



                                            James Rueda DO            



                                            Technologist: Alexandru Odonnellsclucina            



                                            Date/Time/By:            



                                            2021 (1531) :            



                                            By: [email or web address] PAGE 1            



                                            Signed Report FAX:            



                                            Lashell Boswell MD            



                                            [phone] [place]:            



                                             St: [facility] FAX:            



                                            James Rueda DO            



                                            ----------------------            



                                            ----------------------            



                                            ----------------------            



                                            ------------- Name:            



                                            LY FRANCIS [facility] :            



                                            1935 Age/S: 86/F            



                                            [address]:            



                                            [id number] Loc: [place]            



                                            [place] [address]            



                                            Phys: James Rueda Acct:            



                                            [id number] Dis Date:            



                                            Status: DEP ER PHONE            



                                            #: [phone] Exam            



                                            Date: 2021 3370            



                                            FAX #: [phone]            



                                            Reason: fall/head            



                                            stiek/ neck pain/pain            



                                            pain/hip pain/ EXAMS:            



                                            CPT CODE: [id number] XR            



                                            FOREARM 2 VIEWS RT            



                                            [address] (Continued) Orig            



                                            Print D/T: S:            



                                            2021 (1535) PAGE            



                                            2 Signed Report            

 

           - XR KNEE 3 V RT 2021            **********************        

    



                      15:25:00              **********************            



                                            [facility] : 1935            



                                            Sex:                  



                                            F*********************            



                                            **********************            



                                            ***************** FAX:            



                                            Lashell Boswell MD            



                                            [phone] [place]:            



                                             St: PRE FAX:            



                                            James Rueda DO            



                                            ----------------------            



                                            ----------------------            



                                            ----------------------            



                                            ------------- Name:            



                                            LY FRANCIS [facility]            



                                            [place]  :            



                                            1935 Age/S: 86/F            



                                            [address]:            



                                            [id number] Loc: [place] [address]            



                                            Phys: James Rueda DO R2  Acct:            



                                            [id number] Dis Date:            



                                            Status: PRE ER PHONE            



                                            #: [phone] Exam            



                                            Date: 2021 1459            



                                            FAX #: [phone]            



                                            Reason: fall/head            



                                            stiek/ neck pain/pain            



                                            pain/hip pain/ EXAMS:            



                                            CPT CODE: [id number] XR            



                                            KNEE 3 V RT  84067            



                                            EXAMINATION(S): 3            



                                            views right knee            



                                            INDICATION: Right knee            



                                            pain COMPARISON: None            



                                            LOCATION: S17            



                                            FINDINGS:             



                                            Tricompartmental            



                                            degenerative changes,            



                                            severe in the            



                                            patellofemoral            



                                            compartment and            



                                            otherwise moderate. No            



                                            fracture or            



                                            dislocation            



                                            identified. Possible            



                                            joint effusion.            



                                            IMPRESSION: No acute            



                                            osseous abnormality            



                                            identified. **            



                                            Electronically Signed            



                                            by Carlos Manuel Navas MD on            



                                            2021 at 1525 **            



                                            Reported and signed            



                                            by: Carlos Manuel Navas MD CC:            



                                            Lashell Boswell MD; James Rueda DO            



                                            Technologist: Alexandru Walton            



                                            Date/Time/By:            



                                            2021 (1525) :            



                                            By: [email or web address] PAGE 1            



                                            Signed Report FAX:            



                                            Lashell Boswell MD            



                                            [phone] [place]:            



                                             St: PRE FAX:            



                                            James Rueda DO            



                                            ----------------------            



                                            ----------------------            



                                            ----------------------            



                                            ------------- Name:            



                                            LY FRANCIS :            



                                            1935 Age/S: 86/F            



                                            [address]  [address]            



                                            #: [id number] Loc:            



                                            [place]            



                                            [address] Phys:            



                                            James Rueda            



                                            Acct: [id number] Dis            



                                            Date: Status: PRE ER            



                                            PHONE #: [phone]            



                                            Exam Date: 2021            



                                            1459 FAX #:            



                                            [phone] Reason:            



                                            fall/head stiek/ neck            



                                            pain/pain pain/hip            



                                            pain/ EXAMS: CPT CODE:            



                                            [id number] XR KNEE 3 V            



                                            RT 56454 <Continued>            



                                            Orig Print D/T: S:            



                                            2021 (8189) PAGE            



                                            2 Signed Report            

 

           - XR KNEE 3 V RT 2021            **********************        

    



                      15:25:00              **********************            



                                            [facility] 1935            



                                            Sex:                  



                                            F*********************            



                                            **********************            



                                            ***************** FAX:            



                                            Lashell Boswell MD            



                                            [phone]  [place]:            



                                            [facility]: [facility] FAX:            



                                            James Rueda DO            



                                            ----------------------            



                                            ----------------------            



                                            ----------------------            



                                            ------------- Name:            



                                            LY FRANCIS :            



                                            1935 Age/S: 86/F            



                                            [address]:            



                                            [id number] Loc: [place] [address]            



                                            Phys: James Rueda Acct:            



                                            [id number] Dis Date:            



                                            Status: [facility] ER PHONE            



                                            #: [phone] Exam            



                                            Date: 2021  1455            



                                            FAX #: [phone]            



                                            Reason: fall/head            



                                            stiek/ neck pain/pain            



                                            pain/hip pain/ EXAMS:            



                                            CPT CODE: [id number] XR            



                                            KNEE 3 V RT 88072            



                                            EXAMINATION(S): 3            



                                            views right knee            



                                            INDICATION: Right knee            



                                            pain COMPARISON: None            



                                            LOCATION: S17            



                                            FINDINGS:             



                                            Tricompartmental            



                                            degenerative changes,            



                                            severe in the            



                                            patellofemoral            



                                            compartment and            



                                            otherwise moderate. No            



                                            fracture or            



                                            dislocation            



                                            identified. Possible            



                                            joint effusion.            



                                            IMPRESSION: No acute            



                                            osseous abnormality            



                                            identified. **            



                                            Electronically Signed            



                                            by Carlos Manuel Navas MD on            



                                            2021 at 1525 **            



                                            Reported and signed            



                                            by: Carlos Manuel Navas MD CC:            



                                            Lashell Boswell MD; James Hansonnick             



                                            Technologist: Alexandru Benavides Trnscrd            



                                            Date/Time/By:            



                                            2021 (1525) :            



                                            By: [email or web address] PAGE 1            



                                            Signed Report FAX:            



                                            Lashell Boswell MD            



                                            [phone] [place]:            



                                            [facility]: [facility] FAX:            



                                            James Rueda DO            



                                            ----------------------            



                                            ----------------------            



                                            ----------------------            



                                            ------------- Name:            



                                            LY FRANCIS :            



                                            1935 Age/S: 86/F            



                                            [address]  [address]            



                                            #: [id number] Loc:            



                                            [place]            



                                            [address] Phys:            



                                            James Rueda            



                                            Acct: [id number] Dis            



                                            Date:  Status: [facility] ER            



                                            PHONE #: [phone]            



                                            Exam Date: 2021            



                                            1459 FAX #:            



                                            [phone] Reason:            



                                            fall/head stiek/ neck            



                                            pain/pain pain/hip            



                                            pain/ EXAMS: CPT CODE:            



                                            [id number] XR KNEE 3 V            



                                            RT 23395 (Continued)            



                                            Orig Print D/T: S:            



                                            2021 (1524) PAGE            



                                            2 Signed Report            

 

           - CT T-SPINE W/O 2021            **********************        

    



           CONTRAST   14:53:00              **********************            



                                            [facility] 1935            



                                            Sex:                  



                                            F*********************            



                                            **********************            



                                            ***************** FAX:            



                                            Lashell Boswell MD            



                                            [phone] [place]:            



                                             St: PRE FAX:            



                                            James Rueda DO            



                                            ----------------------            



                                            ----------------------            



                                            ----------------------            



                                            ------------- Name:            



                                            LY FRANCIS  [facility] :            



                                            1935 Age/S: 86/F            



                                            [address]:            



                                            [id number] Loc: [place] [address]            



                                            Phys: James Rueda DO R2 Acct:            



                                            [id number] Dis Date:            



                                            Status: PRE ER PHONE            



                                            #: [phone] Exam            



                                            Date: 2021 1435            



                                            FAX #: [phone]            



                                            Reason: fall/head            



                                            stiek/ neck pain/pain            



                                            pain/hip pain/ EXAMS:            



                                            CPT CODE: [id number] CT            



                                            T-SPINE W/O CONTRAST            



                                            54903 Exam: CT of the            



                                            thoracic and lumbar            



                                            spine without contrast            



                                            Location: H24 HISTORY:            



                                            , fall/head / neck            



                                            pain/pain pain/hip            



                                            pain/ knee pain,            



                                            COMPARISON: None            



                                            available TECHNIQUE:            



                                            Axial images of the            



                                            thoracic and lumbar            



                                            spine were obtained            



                                            without contrast.            



                                            Images were            



                                            reformatted to create            



                                            coronal and sagittal            



                                            reconstructions . One            



                                            or more of the            



                                            following dose            



                                            reduction techniques            



                                            were used: Automated            



                                            exposure control,            



                                            adjustment of the mA            



                                            and/or kV according to            



                                            patient size, and/or            



                                            utilization of            



                                            iterative             



                                            reconstruction            



                                            technique. DLP: 511            



                                            mGy-cm. FINDINGS:            



                                            Thoracic spine There            



                                            is diffuse osteopenia.            



                                            There is exaggeration            



                                            of normal thoracic            



                                            kyphosis. There is no            



                                            acute fracture or            



                                            dislocation. Vertebral            



                                            body heights are still            



                                            maintained. There is            



                                            bridging anterior            



                                            aspect stenosis            



                                            throughout the mid            



                                            thoracic spine as well            



                                            as partial acquired            



                                            fusion of the disc            



                                            spaces from T6-T7            



                                            through T10-T11. There            



                                            is atrophy of            



                                            paraspinal muscles.            



                                            Emphysematous changes            



                                            are noted in the            



                                            lungs. On axial            



                                            images, there is no            



                                            significant central            



                                            canal stenosis. There            



                                            is hypertrophy of            



                                            facet joints            



                                            contributing to            



                                            foraminal narrowing            



                                            particularly from            



                                            T6-T7 through T11-T12.            



                                            FINDINGS: Lumbar spine            



                                            There is diffuse            



                                            osteopenia. No            



                                            displaced fracture or            



                                            dislocation is seen.            



                                            Vertebral body heights            



                                            are maintained. Vacuum            



                                            disc phenomena are            



                                            present from L2-L3            



                                            through L5-S1 with            



                                            associated disc space            



                                            narrowing. There is            



                                            sclerosis between            



                                            spinous processes from            



                                            L2 through S1 likely            



                                            representing            



                                            Baastrup's pathology.            



                                            There is atrophy of            



                                            paraspinal muscles.            



                                            Diverticula are            



                                            present within the            



                                            sigmoid colon.            



                                            Visualized            



                                            intra-abdominal            



                                            contents are otherwise            



                                            within normal limits.            



                                            On axial images, there            



                                            is both spondylosis            



                                            and facet arthrosis            



                                            contributing to            



                                            central canal stenosis            



                                            at the L2-L3 through            



                                            L5-S1 levels, most            



                                            pronounced at L3-L4            



                                            and L4-L5. There is            



                                            also foraminal            



                                            narrowing from T12-L1            



                                            through L5-S1.            



                                            IMPRESSION: PAGE 1            



                                            Signed Report            



                                            (CONTINUED) FAX:            



                                            Lashell Boswell MD            



                                            [phone] [place]:            



                                             St: PRE FAX:            



                                            James Rueda DO            



                                            ----------------------            



                                            ----------------------            



                                            ----------------------            



                                            -------------  Name:            



                                            LY FRANCIS [facility] :            



                                            1935 Age/S: 86/F            



                                            [address]:            



                                            [id number] Loc: [place] [address]            



                                            Phys: James Rueda DO R2 Acct:            



                                            [id number] Dis Date:            



                                            Status: PRE ER PHONE            



                                            #: [phone] Exam            



                                            Date: 2021 1435            



                                            FAX #: [phone]            



                                            Reason: fall/head            



                                            stiek/ neck pain/pain            



                                            pain/hip pain/ EXAMS:            



                                            CPT CODE: [id number] CT            



                                            T-SPINE W/O CONTRAST            



                                            59077 <Continued> 1.            



                                            Diffuse osteopenia. No            



                                            displaced fracture or            



                                            dislocation.  2.            



                                            Exaggeration of normal            



                                            thoracic kyphosis.            



                                            Vertebral body heights            



                                            are maintained. 3.            



                                            Spondylosis and facet            



                                            arthrosis contribute            



                                            to canal stenosis from            



                                            L2-L3 through L5-S1.            



                                            Foraminal narrowing is            



                                            present from T6-T7            



                                            through L5-S1. **            



                                            Electronically Signed            



                                            by Mejia Herrera MD on            



                                            2021 at 7573 **            



                                            Reported and signed            



                                            by: Mejia Herrera MD CC:            



                                            Lashell Boswell MD; James Rueda DO            



                                            Technologist: Katie Pelaez Trnscrd            



                                            Dt/Tm: 2021            



                                            (7163) [email or web address] Orig            



                                            Print D/T: S:            



                                            2021 (6613  PAGE            



                                            2 Signed Report            

 

           - CT L-SPINE W/O 2021            **********************        

    



           CONTRAST   14:53:00              **********************            



                                            [facility] 1935            



                                            Sex:                  



                                            F*********************            



                                            **********************            



                                            ***************** FAX:            



                                            Lashell Boswell MD            



                                            [phone] [place]:            



                                             St: PRE FAX:            



                                            James Rueda DO            



                                            ----------------------            



                                            ----------------------            



                                            ----------------------            



                                            ------------- Name:            



                                            LY FRANCIS :            



                                            1935 Age/S: 86/F            



                                            [address]  [address]:            



                                            [id number] Loc: [place]            



                                            [place] [address]            



                                            Phys: James Rueda            



                                            DO R2 Acct:            



                                            [id number] Dis Date:            



                                             Status: PRE ER PHONE            



                                            #: [phone] Exam            



                                            Date: 2021 1435            



                                            FAX #: [phone]            



                                            Reason: fall/head            



                                            stiek/ neck pain/pain            



                                            pain/hip pain/ EXAMS:            



                                            CPT CODE: [id number] CT            



                                            L-SPINE W/O CONTRAST            



                                            30534 Exam: CT of the            



                                            thoracic and lumbar            



                                            spine without contrast            



                                            Location: [facility] HISTORY:            



                                            , fall/head / neck            



                                            pain/pain pain/hip            



                                            pain/ knee pain,            



                                            COMPARISON: None            



                                            available  TECHNIQUE:            



                                            Axial images of the            



                                            thoracic and lumbar            



                                            spine were obtained            



                                            without contrast.            



                                            Images were            



                                            reformatted to create            



                                            coronal and sagittal            



                                            reconstructions . One            



                                            or more of the            



                                            following dose            



                                            reduction techniques            



                                            were used: Automated            



                                            exposure control,            



                                            adjustment of the mA            



                                            and/or kV according to            



                                            patient size, and/or            



                                            utilization of            



                                            iterative             



                                            reconstruction            



                                            technique. DLP: 511            



                                            mGy-cm. FINDINGS:            



                                            Thoracic spine There            



                                            is diffuse osteopenia.            



                                            There is exaggeration            



                                            of normal thoracic            



                                            kyphosis. There is no            



                                            acute fracture or            



                                            dislocation. Vertebral            



                                            body heights are still            



                                            maintained. There is            



                                            bridging anterior            



                                            aspect stenosis            



                                            throughout the mid            



                                            thoracic spine as well            



                                            as partial acquired            



                                            fusion of the disc            



                                            spaces from T6-T7            



                                            through T10-T11. There            



                                            is atrophy of            



                                            paraspinal muscles.            



                                            Emphysematous changes            



                                            are noted in the            



                                            lungs. On axial            



                                            images, there is no            



                                            significant central            



                                            canal stenosis. There            



                                            is hypertrophy of            



                                            facet joints            



                                            contributing to            



                                            foraminal narrowing            



                                            particularly from            



                                            T6-T7 through T11-T12.            



                                            FINDINGS: Lumbar spine            



                                            There is diffuse            



                                            osteopenia. No            



                                            displaced fracture or            



                                            dislocation is seen.            



                                            Vertebral body heights            



                                            are maintained. Vacuum            



                                            disc phenomena are            



                                            present from L2-L3            



                                            through L5-S1 with            



                                            associated disc space            



                                            narrowing. There is            



                                            sclerosis between            



                                            spinous processes from            



                                            L2 through S1 likely            



                                            representing            



                                            Baastrup's pathology.            



                                            There is atrophy of            



                                            paraspinal muscles.            



                                            Diverticula are            



                                            present within the            



                                            sigmoid colon.            



                                            Visualized            



                                            intra-abdominal            



                                            contents are otherwise            



                                            within normal limits.            



                                            On axial images, there            



                                            is both spondylosis            



                                            and facet arthrosis            



                                            contributing to            



                                            central canal stenosis            



                                            at the L2-L3 through            



                                            L5-S1 levels, most            



                                            pronounced at L3-L4            



                                            and L4-L5. There is            



                                            also foraminal            



                                            narrowing from T12-L1            



                                            through L5-S1.            



                                            IMPRESSION: PAGE 1            



                                            Signed Report            



                                            (CONTINUED) FAX:            



                                            Lashell Boswell MD            



                                            [phone] [place]:            



                                             St: PRE FAX:            



                                            James Rueda DO            



                                            ----------------------            



                                            ----------------------            



                                            ----------------------            



                                            ------------- Name:            



                                            LY FRANCIS :            



                                            1935 Age/S: 86/F            



                                            [address]:            



                                            [id number] Loc: [place] [address]            



                                            Phys: James Rueda DO R2  Acct:            



                                            [id number] Dis Date:            



                                            Status: PRE ER PHONE            



                                            #: [phone] Exam            



                                            Date: 2021 1435            



                                            FAX #: [phone]            



                                            Reason: fall/head            



                                            stiek/ neck pain/pain            



                                            pain/hip pain/ EXAMS:            



                                            CPT CODE: [id number] CT            



                                            L-SPINE W/O CONTRAST            



                                            40862  <Continued> 1.            



                                            Diffuse osteopenia. No            



                                            displaced fracture or            



                                            dislocation. 2.            



                                            Exaggeration of normal            



                                            thoracic kyphosis.            



                                            Vertebral body heights            



                                            are maintained.  3.            



                                            Spondylosis and facet            



                                            arthrosis contribute            



                                            to canal stenosis from            



                                            L2-L3 through L5-S1.            



                                            Foraminal narrowing is            



                                            present from T6-T7            



                                            through L5-S1. **            



                                            Electronically Signed            



                                            by Mejia Herrera MD on            



                                            2021 at 9462 **            



                                            Reported and signed            



                                            by: Mejia Herrera MD CC:            



                                            Lashell Boswell MD; James Rueda DO            



                                            Technologist: Katie Pelaez Trnscrd            



                                            Dt/Tm: 2021            



                                            (7157) [email or web address] Orig            



                                            Print D/T: S:            



                                            2021 (6349 PAGE            



                                            2 Signed Report            

 

           - CT T-SPINE W/O 2021            **********************        

    



           CONTRAST   14:53:00              **********************            



                                            [facility] : 1935            



                                            Sex:                  



                                            F*********************            



                                            **********************            



                                            ***************** FAX:            



                                            Lashell Boswell MD            



                                            [phone] [place]:            



                                            [facility]: [facility] FAX:            



                                            James Rueda DO            



                                            ----------------------            



                                            ----------------------            



                                            ----------------------            



                                            ------------- Name:            



                                            LY FRANCIS  [facility] :            



                                            1935 Age/S: 86/F            



                                            [address]:            



                                            [id number] Loc: [place]            



                                            [place] [address]            



                                            Phys: James Rueda DO R2 Acct:            



                                            [id number] Dis Date:            



                                            Status: [facility] ER PHONE            



                                            #: [phone] Exam            



                                            Date: 2021 [facility]            



                                            FAX #: [phone]            



                                            Reason: fall/head            



                                            stiek/ neck pain/pain            



                                            pain/hip pain/ EXAMS:            



                                            CPT CODE: [id number] CT            



                                            T-SPINE W/O CONTRAST            



                                            68906 Exam: CT of the            



                                            thoracic and lumbar            



                                            spine without contrast            



                                            Location: [facility] HISTORY:            



                                            , fall/head / neck            



                                            pain/pain pain/hip            



                                            pain/ knee pain,            



                                            COMPARISON: None            



                                            available TECHNIQUE:            



                                            Axial images of the            



                                            thoracic and lumbar            



                                            spine were obtained            



                                            without contrast.            



                                            Images were            



                                            reformatted to create            



                                            coronal and sagittal            



                                            reconstructions . One            



                                            or more of the            



                                            following dose            



                                            reduction techniques            



                                            were used: Automated            



                                            exposure control,            



                                            adjustment of the mA            



                                            and/or kV according to            



                                            patient size, and/or            



                                            utilization of            



                                            iterative             



                                            reconstruction            



                                            technique. DLP: 511            



                                            mGy-cm. FINDINGS:            



                                            Thoracic spine There            



                                            is diffuse osteopenia.            



                                            There is exaggeration            



                                            of normal thoracic            



                                            kyphosis. There is no            



                                            acute fracture or            



                                            dislocation. Vertebral            



                                            body heights are still            



                                            maintained. There is            



                                            bridging anterior            



                                            aspect stenosis            



                                            throughout the mid            



                                            thoracic spine as well            



                                            as partial acquired            



                                            fusion of the disc            



                                            spaces from T6-T7            



                                            through T10-T11. There            



                                            is atrophy of            



                                            paraspinal muscles.            



                                            Emphysematous changes            



                                            are noted in the            



                                            lungs. On axial            



                                            images, there is no            



                                            significant central            



                                            canal stenosis. There            



                                            is hypertrophy of            



                                            facet joints            



                                            contributing to            



                                            foraminal narrowing            



                                            particularly from            



                                            T6-T7 through T11-T12.            



                                            FINDINGS: Lumbar spine            



                                            There is diffuse            



                                            osteopenia. No            



                                            displaced fracture or            



                                            dislocation is seen.            



                                            Vertebral body heights            



                                            are maintained. Vacuum            



                                            disc phenomena are            



                                            present from L2-L3            



                                            through L5-S1 with            



                                            associated disc space            



                                            narrowing. There is            



                                            sclerosis between            



                                            spinous processes from            



                                            L2 through S1 likely            



                                            representing            



                                            Baastrup's pathology.            



                                            There is atrophy of            



                                            paraspinal muscles.            



                                            Diverticula are            



                                            present within the            



                                            sigmoid colon.            



                                            Visualized            



                                            intra-abdominal            



                                            contents are otherwise            



                                            within normal limits.            



                                            On axial images, there            



                                            is both spondylosis            



                                            and facet arthrosis            



                                            contributing to            



                                            central canal stenosis            



                                            at the L2-L3 through            



                                            L5-S1 levels, most            



                                            pronounced at L3-L4            



                                            and L4-L5. There is            



                                            also foraminal            



                                            narrowing from T12-L1            



                                            through L5-S1.            



                                            IMPRESSION: PAGE 1            



                                            Signed Report            



                                            (CONTINUED) FAX:            



                                            Lashell Boswell MD            



                                            [phone] [place]:            



                                            [facility]: [facility] FAX:            



                                            James Rueda DO            



                                            ----------------------            



                                            ----------------------            



                                            ----------------------            



                                            -------------  Name:            



                                            LY FRANCIS :            



                                            1935 Age/S: 86/F            



                                            [address]:            



                                            [id number] Loc: [place]            



                                            [place] [address]            



                                            Phys: James Rueda            



                                            DO R2 Acct:            



                                            [id number] Dis Date:            



                                            Status: [facility] ER PHONE            



                                            #: [phone] Exam            



                                            Date: 2021 1435            



                                            FAX #: [phone]            



                                            Reason: fall/head            



                                            stiek/ neck pain/pain            



                                            pain/hip pain/ EXAMS:            



                                            CPT CODE: [id number] CT            



                                            T-SPINE W/O CONTRAST            



                                            70334 (Continued) 1.            



                                            Diffuse osteopenia. No            



                                            displaced fracture or            



                                            dislocation. 2.            



                                            Exaggeration of normal            



                                            thoracic kyphosis.            



                                            Vertebral body heights            



                                            are maintained. 3.            



                                            Spondylosis and facet            



                                            arthrosis contribute            



                                            to canal stenosis from            



                                            L2-L3 through L5-S1.            



                                            Foraminal narrowing is            



                                            present from T6-T7            



                                            through L5-S1. **            



                                            Electronically Signed            



                                            by Mejia Herrera MD on            



                                            2021 at 1453 **            



                                            Reported and signed            



                                            by: Mejia Herrera MD CC:            



                                            Lashell Boswell MD; James Rueda DO            



                                            Technologist: Katie Pelaez Trnscrd            



                                            Dt/Tm: 2021            



                                            (0410) [email or web address] Orig            



                                            Print D/T: S:            



                                            2021 (7186  PAGE            



                                            2 Signed Report            

 

           - CT L-SPINE W/O 2021            **********************        

    



           CONTRAST   14:53:00              **********************            



                                            [facility] 1935            



                                            Sex:                  



                                            F*********************            



                                            **********************            



                                            ***************** FAX:            



                                            Lashell Boswell MD            



                                            [phone] [place]:            



                                             St: [facility] FAX:            



                                            James Rueda DO            



                                            ----------------------            



                                            ----------------------            



                                            ----------------------            



                                            ------------- Name:            



                                            LY FRANCIS [facility] :            



                                            1935 Age/S: 86/F            



                                            [address]  [address]:            



                                            [id number] Loc: [place]            



                                            [place] [address]            



                                            Phys: James Rueda Acct:            



                                            [id number] Dis Date:            



                                             Status: DEP ER PHONE            



                                            #: [phone] Exam            



                                            Date: 2021 1435            



                                            FAX #: [phone]            



                                            Reason: fall/head            



                                            stiek/ neck pain/pain            



                                            pain/hip pain/ EXAMS:            



                                            CPT CODE: [id number] CT            



                                            L-SPINE W/O CONTRAST            



                                            [id number] Exam: CT of the            



                                            thoracic and lumbar            



                                            spine without contrast            



                                            Location: [facility] HISTORY:            



                                            , fall/head / neck            



                                            pain/pain pain/hip            



                                            pain/ knee pain,            



                                            COMPARISON: None            



                                            available TECHNIQUE:            



                                            Axial images of the            



                                            thoracic and lumbar            



                                            spine were obtained            



                                            without contrast.            



                                            Images were            



                                            reformatted to create            



                                            coronal and sagittal            



                                            reconstructions . One            



                                            or more of the            



                                            following dose            



                                            reduction techniques            



                                            were used: Automated            



                                            exposure control,            



                                            adjustment of the mA            



                                            and/or kV according to            



                                            patient size, and/or            



                                            utilization of            



                                            iterative             



                                            reconstruction            



                                            technique. DLP: 511            



                                            mGy-cm. FINDINGS:            



                                            Thoracic spine There            



                                            is diffuse osteopenia.            



                                            There is exaggeration            



                                            of normal thoracic            



                                            kyphosis. There is no            



                                            acute fracture or            



                                            dislocation. Vertebral            



                                            body heights are still            



                                            maintained. There is            



                                            bridging anterior            



                                            aspect stenosis            



                                            throughout the mid            



                                            thoracic spine as well            



                                            as partial acquired            



                                            fusion of the disc            



                                            spaces from T6-T7            



                                            through T10-T11. There            



                                            is atrophy of            



                                            paraspinal muscles.            



                                            Emphysematous changes            



                                            are noted in the            



                                            lungs. On axial            



                                            images, there is no            



                                            significant central            



                                            canal stenosis. There            



                                            is hypertrophy of            



                                            facet joints            



                                            contributing to            



                                            foraminal narrowing            



                                            particularly from            



                                            T6-T7 through T11-T12.            



                                            FINDINGS: Lumbar spine            



                                            There is diffuse            



                                            osteopenia. No            



                                            displaced fracture or            



                                            dislocation is seen.            



                                            Vertebral body heights            



                                            are maintained. Vacuum            



                                            disc phenomena are            



                                            present from L2-L3            



                                            through L5-S1 with            



                                            associated disc space            



                                            narrowing. There is            



                                            sclerosis between            



                                            spinous processes from            



                                            L2 through S1 likely            



                                            representing            



                                            Baastrup's pathology.            



                                            There is atrophy of            



                                            paraspinal muscles.            



                                            Diverticula are            



                                            present within the            



                                            sigmoid colon.            



                                            Visualized            



                                            intra-abdominal            



                                            contents are otherwise            



                                            within normal limits.            



                                            On axial images, there            



                                            is both spondylosis            



                                            and facet arthrosis            



                                            contributing to            



                                            central canal stenosis            



                                            at the L2-L3 through            



                                            L5-S1 levels, most            



                                            pronounced at L3-L4            



                                            and L4-L5. There is            



                                            also foraminal            



                                            narrowing from T12-L1            



                                            through L5-S1.            



                                            IMPRESSION: PAGE 1            



                                            Signed Report            



                                            (CONTINUED) FAX:            



                                            Lashell Boswell MD            



                                            [phone] [place]:            



                                             St: DEP FAX:            



                                            James Rueda DO            



                                            ----------------------            



                                            ----------------------            



                                            ----------------------            



                                            ------------- Name:            



                                            [facility]            



                                            1935 Age/S: 86/F            



                                            [address]:            



                                            [id number] Loc: [place]            



                                            [place] [address]            



                                            Phys: James Rueda DO [place] Acct:            



                                            [id number] Dis Date:            



                                            Status: DEP ER PHONE            



                                            #: [phone] Exam            



                                            Date: 20215            



                                            FAX #: [phone]            



                                            Reason: fall/head            



                                            stiek/ neck pain/pain            



                                            pain/hip pain/ EXAMS:            



                                            CPT CODE: [id number] CT            



                                            L-SPINE W/O CONTRAST            



                                            27632 (Continued) 1.            



                                            Diffuse osteopenia. No            



                                            displaced fracture or            



                                            dislocation. 2.            



                                            Exaggeration of normal            



                                            thoracic kyphosis.            



                                            Vertebral body heights            



                                            are maintained. 3.            



                                            Spondylosis and facet            



                                            arthrosis contribute            



                                            to canal stenosis from            



                                            L2-L3 through L5-S1.            



                                            Foraminal narrowing is            



                                            present from T6-T7            



                                            through L5-S1. **            



                                            Electronically Signed            



                                            by Mejia Herrera MD on            



                                            2021 at 0052 **            



                                            Reported and signed            



                                            by: Mejia Herrera MD CC:            



                                            Lashell Boswell MD; James Rueda DO            



                                            Technologist: Katie Pelaez Trnscrd            



                                            Dt/Tm: 2021            



                                            (9499) [email or web address] Orig            



                                            Print D/T: S:            



                                            2021 (3831 PAGE            



                                            2 Signed Report            

 

           - CT C-SPINE W/O 2021            **********************        

    



           CONT       14:46:00              **********************            



                                            [facility] 1935            



                                            Sex:                  



                                            F*********************            



                                            **********************            



                                            ***************** FAX:            



                                            Lashell Boswell MD            



                                            [phone] [place]:            



                                             St: PRE FAX:            



                                            James Rueda DO            



                                            ----------------------            



                                            ----------------------            



                                            ----------------------            



                                            ------------- Name:            



                                            LY FRANCIS [facility] :            



                                            1935 Age/S: 86/F            



                                            [address]:            



                                            [id number] Loc: [place]            



                                            [place] [address]            



                                            Phys: James Rueda DO R2 Acct:            



                                            [id number] Dis Date:            



                                            Status: PRE ER PHONE            



                                            #: [phone] Exam            



                                            Date: 2021 1435            



                                            FAX #: [phone]            



                                            Reason: fall/head            



                                            stiek/ neck pain/pain            



                                            pain/hip pain/ EXAMS:            



                                            CPT CODE: [id number] CT            



                                            C-SPINE W/O CONT 31549            



                                            EXAM: 1. CT Head            



                                            without contrast 2. CT            



                                            Cervical spine without            



                                            contrast Location: [facility]24            



                                            HISTORY: Fall, head            



                                            and neck pain            



                                            COMPARISON: None            



                                            available. TECHNIQUE:            



                                            Multiple transaxial            



                                            images of the head and            



                                            cervical spine were            



                                            obtained without            



                                            intravenous contrast.            



                                            Images were            



                                            reformatted to create            



                                            coronal and sagittal            



                                            reconstructions. One            



                                            or more of the            



                                            following dose            



                                            reduction techniques            



                                            were used: Automated            



                                            exposure control,            



                                            adjustment of the mA            



                                            and/or kV according to            



                                            patient size, and/or            



                                            utilization of            



                                            iterative             



                                            reconstruction            



                                            technique. DLP: 1443            



                                            mGy-cm. FINDINGS: Head            



                                            There is no acute            



                                            intracranial            



                                            hemorrhage. There is            



                                            no mass, mass effect,            



                                            midline shift or            



                                            extra-axial fluid            



                                            collection. Brain            



                                            parenchymal volume is            



                                            diffusely decreased            



                                            which is within normal            



                                            limits given patient's            



                                            age. Enlargement of            



                                            the right lateral            



                                            ventricles            



                                            commensurate with            



                                            degree of central            



                                            atrophy. Gray-white            



                                            differentiation is            



                                            maintained. There is            



                                            no evidence for acute            



                                            major vessel infarct.            



                                            There are areas of            



                                            decreased attenuation            



                                            within subcortical and            



                                            deep white matter            



                                            which are nonspecific.            



                                             Paranasal sinuses,            



                                            mastoid air cells and            



                                            visualized orbital            



                                            contents are within            



                                            normal limits. Bones            



                                            and calvaria and skull            



                                            base are intact. Note            



                                            is made of bilateral            



                                            lens replacements.            



                                            Intraorbital contents            



                                            are within normal            



                                            limits. FINDINGS:            



                                            Cervical Spine            



                                            Postoperative changes            



                                            include decompression            



                                            laminectomies from C3            



                                            PAGE 1 Signed Report            



                                            (CONTINUED) FAX:            



                                            Lashell Boswell MD            



                                            [phone] [place]:            



                                             St: PRE FAX:            



                                            MohitJames DO            



                                            ----------------------            



                                            ----------------------            



                                            ----------------------            



                                            ------------- Name:            



                                            [facility]            



                                            [place] :            



                                            1935 Age/S: 86/F            



                                             [address]:            



                                            [id number] Loc: [facility]            



                                            [place] [address]            



                                            Phys: MargienickJames            



                                            DO R2 Acct:            



                                            [id number] Dis Date:            



                                            Status: PRE ER PHONE            



                                            #: [phone] Exam            



                                            Date: 2021 1435            



                                            FAX #: [phone]            



                                            Reason: fall/head            



                                            stiek/ neck pain/pain            



                                            pain/hip pain/ EXAMS:            



                                            CPT CODE: [id number] CT            



                                            C-SPINE W/O CONT 56098            



                                            <Continued> through            



                                            C6. There is acquired,            



                                            osseous fusion of the            



                                            facet joints. There is            



                                            no acute fracture or            



                                            dislocation. There is            



                                            diffuse osteopenia.            



                                            Vertebral body heights            



                                            are maintained. There            



                                            is fusion of the disc            



                                            spaces from C3-C4            



                                            through C5-C6. There            



                                            is mild atrophy of            



                                            paraspinal muscles.            



                                            Nodules are present            



                                            within the thyroid            



                                            gland. Largest on the            



                                            right measures up to            



                                            1.1 cm. Consider            



                                            further               



                                            characterization with            



                                            thyroid ultrasound.            



                                            Occiput-C1: Intact.            



                                            C1-C2: Narrowing of            



                                            the predental space            



                                            and subchondral            



                                            sclerosis.            



                                            Atlantoaxial joint is            



                                            maintained. Mild            



                                            central canal            



                                            stenosis. Hypertrophy            



                                            of the right lateral            



                                            mass contributing to            



                                            moderate right            



                                            foraminal narrowing.            



                                            C2-C3: Disc osteophyte            



                                            complex measuring up            



                                            to 3 mm. Fusion of the            



                                            fused facet joints.            



                                            Mild foraminal            



                                            narrowing. Mild            



                                            central canal            



                                            stenosis. C3-C4            



                                            through C5-C6:            



                                            Decompression            



                                            laminectomies are            



                                            noted. There is            



                                            persistent spondylotic            



                                            ridging and            



                                            hypertrophy of the            



                                            fused facet joints            



                                            contributing to            



                                            moderate foraminal            



                                            narrowing.  C6-C7:            



                                            Partial decompression.            



                                            Moderate to severe            



                                            bilateral facet            



                                            arthropathy and            



                                            foraminal narrowing.            



                                            No canal stenosis.            



                                            C7-T1: Mild bilateral            



                                            facet arthropathy and            



                                            foraminal narrowing.            



                                            No central canal            



                                            stenosis. Pleural and            



                                            parenchymal scarring            



                                            is seen at bilateral            



                                            lung apices.            



                                            IMPRESSION: 1. No            



                                            acute intracranial            



                                            abnormality. No acute            



                                            hemorrhage, mass            



                                            lesion or infarct. 2.            



                                            Senescent changes            



                                            including brain            



                                            parenchymal volume            



                                            loss and chronic small            



                                            vessel ischemic white            



                                            matter disease.  3. No            



                                            acute fracture or            



                                            dislocation. 4.            



                                            Decompression            



                                            laminectomies from C3            



                                            through C6 with            



                                            acquired osseous            



                                            fusion of facet            



                                            joints. Persistent            



                                            spondylosis and facet            



                                            arthrosis contribute            



                                            to foraminal            



                                            narrowing. 5. Nodules            



                                            within the thyroid            



                                            gland. Largest            



                                            measures up to 1.1 cm.            



                                            PAGE 2 Signed Report            



                                            (CONTINUED)  FAX:            



                                            Lashell Boswell MD            



                                            [phone] [place]:            



                                             St: PRE FAX:            



                                            James Rueda DO            



                                            ----------------------            



                                            ----------------------            



                                            ----------------------            



                                            ------------- Name:            



                                            LY FRANCIS :            



                                            1935 Age/S: 86/F            



                                            [address]:            



                                            [id number] Loc: [facility]            



                                            [place] [address]            



                                            Phys: James Rueda DO R2 Acct:            



                                            [id number] Dis Date:            



                                            Status: PRE ER PHONE            



                                            #: [phone] Exam            



                                            Date: 2021 1435            



                                            FAX #: [phone]            



                                            Reason: fall/head            



                                            stiek/ neck pain/pain            



                                            pain/hip pain/ EXAMS:            



                                            CPT CODE: [id number] CT            



                                            C-SPINE W/O CONT 92192            



                                            <Continued>            



                                            Correlation with prior            



                                            ultrasound if            



                                            available. Consider            



                                            further               



                                            characterization with            



                                            ultrasound on            



                                            nonemergent basis if            



                                            not. ** Electronically            



                                            Signed by Mejia Herrera MD            



                                            on 2021 at 1446            



                                            ** Reported and signed            



                                            by: Mejia Herrera MD CC:            



                                            Lashell Boswell MD; James Rueda DO            



                                            Technologist: Katie Pelaez Trnscrd            



                                            Dt/Tm: 2021            



                                            (4806) [id number] Orig            



                                            Print D/T: S:            



                                            2021 (3889  PAGE            



                                            3 Signed Report            

 

           - CT HEAD/BRAIN 2021            **********************         

   



           W/O CONT   14:46:00              **********************            



                                            [facility] : 1935            



                                            Sex:                  



                                            F*********************            



                                            **********************            



                                            ***************** FAX:            



                                            Lashell Boswell MD            



                                            [phone] [place]:            



                                             St: PRE FAX:            



                                            James Rueda DO            



                                            ----------------------            



                                            ----------------------            



                                            ----------------------            



                                            ------------- Name:            



                                            LY FRANCIS [facility] :            



                                            1935 Age/S: 86/F            



                                            [address]  Unit:            



                                            [id number] Loc: [facility]            



                                            [place] [address]            



                                            Phys: James Rueda DO R2 Acct:            



                                            [id number] Dis Date:            



                                             Status: PRE ER PHONE            



                                            #: [phone] Exam            



                                            Date: 2021            



                                            FAX #: [phone]            



                                            Reason: fall/head            



                                            stiek/ neck pain/pain            



                                            pain/hip pain/ EXAMS:            



                                            CPT CODE: [id number] CT            



                                            HEAD/BRAIN W/O CONT            



                                            56251 EXAM: 1. CT Head            



                                            without contrast 2. CT            



                                            Cervical spine without            



                                            contrast Location: H24            



                                            HISTORY: Fall, head            



                                            and neck pain            



                                            COMPARISON: None            



                                            available.  TECHNIQUE:            



                                            Multiple transaxial            



                                            images of the head and            



                                            cervical spine were            



                                            obtained without            



                                            intravenous contrast.            



                                            Images were            



                                            reformatted to create            



                                            coronal and sagittal            



                                            reconstructions. One            



                                            or more of the            



                                            following dose            



                                            reduction techniques            



                                            were used: Automated            



                                            exposure control,            



                                            adjustment of the mA            



                                            and/or kV according to            



                                            patient size, and/or            



                                            utilization of            



                                            iterative             



                                            reconstruction            



                                            technique. DLP: 1443            



                                            mGy-cm. FINDINGS: Head            



                                            There is no acute            



                                            intracranial            



                                            hemorrhage. There is            



                                            no mass, mass effect,            



                                            midline shift or            



                                            extra-axial fluid            



                                            collection. Brain            



                                            parenchymal volume is            



                                            diffusely decreased            



                                            which is within normal            



                                            limits given patient's            



                                            age. Enlargement of            



                                            the right lateral            



                                            ventricles            



                                            commensurate with            



                                            degree of central            



                                            atrophy. Gray-white            



                                            differentiation is            



                                            maintained. There is            



                                            no evidence for acute            



                                            major vessel infarct.            



                                            There are areas of            



                                            decreased attenuation            



                                            within subcortical and            



                                            deep white matter            



                                            which are nonspecific.            



                                            Paranasal sinuses,            



                                            mastoid air cells and            



                                            visualized orbital            



                                            contents are within            



                                            normal limits. Bones            



                                            and calvaria and skull            



                                            base are intact. Note            



                                            is made of bilateral            



                                            lens replacements.            



                                            Intraorbital contents            



                                            are within normal            



                                            limits.  FINDINGS:            



                                            Cervical Spine            



                                            Postoperative changes            



                                            include decompression            



                                            laminectomies from C3            



                                            PAGE 1 Signed Report            



                                            (CONTINUED)  FAX:            



                                            Lashell Boswell MD            



                                            [phone] [place]:            



                                             St: PRE FAX:            



                                            James Rueda DO            



                                            ----------------------            



                                            ----------------------            



                                            ----------------------            



                                            ------------- Name:            



                                            [facility]            



                                            1935 Age/S: 86/F            



                                            [address]:            



                                            [id number] Loc: [place]            



                                            [place] [address]            



                                            Phys: James Rueda DO R2 Acct:            



                                            [id number] Dis Date:            



                                            Status: PRE ER PHONE            



                                            #: [phone] Exam            



                                            Date: 2021 1435            



                                            FAX #: [phone]            



                                            Reason: fall/head            



                                            stiek/ neck pain/pain            



                                            pain/hip pain/ EXAMS:            



                                            CPT CODE: [id number] CT            



                                            HEAD/BRAIN W/O CONT            



                                            43993 <Continued>            



                                            through C6. There is            



                                            acquired, osseous            



                                            fusion of the facet            



                                            joints. There is no            



                                            acute fracture or            



                                            dislocation. There is            



                                            diffuse osteopenia.            



                                            Vertebral body heights            



                                            are maintained. There            



                                            is fusion of the disc            



                                            spaces from C3-C4            



                                            through C5-C6. There            



                                            is mild atrophy of            



                                            paraspinal muscles.            



                                            Nodules are present            



                                            within the thyroid            



                                            gland. Largest on the            



                                            right measures up to            



                                            1.1 cm. Consider            



                                            further               



                                            characterization with            



                                            thyroid ultrasound.            



                                            Occiput-C1: Intact.            



                                            C1-C2: Narrowing of            



                                            the predental space            



                                            and subchondral            



                                            sclerosis.            



                                            Atlantoaxial joint is            



                                            maintained. Mild            



                                            central canal            



                                            stenosis. Hypertrophy            



                                            of the right lateral            



                                            mass contributing to            



                                            moderate right            



                                            foraminal narrowing.            



                                            C2-C3: Disc osteophyte            



                                            complex measuring up            



                                            to 3 mm. Fusion of the            



                                             fused facet joints.            



                                            Mild foraminal            



                                            narrowing. Mild            



                                            central canal            



                                            stenosis. C3-C4            



                                            through C5-C6:            



                                            Decompression            



                                            laminectomies are            



                                            noted. There is            



                                            persistent spondylotic            



                                            ridging and            



                                            hypertrophy of the            



                                            fused facet joints            



                                            contributing to            



                                            moderate foraminal            



                                            narrowing. C6-C7:            



                                            Partial decompression.            



                                            Moderate to severe            



                                            bilateral facet            



                                            arthropathy and            



                                            foraminal narrowing.            



                                            No canal stenosis.            



                                            C7-T1: Mild bilateral            



                                            facet arthropathy and            



                                            foraminal narrowing.            



                                            No central canal            



                                            stenosis. Pleural and            



                                            parenchymal scarring            



                                            is seen at bilateral            



                                            lung apices.            



                                            IMPRESSION: 1. No            



                                            acute intracranial            



                                            abnormality. No acute            



                                            hemorrhage, mass            



                                            lesion or infarct. 2.            



                                            Senescent changes            



                                            including brain            



                                            parenchymal volume            



                                            loss and chronic small            



                                            vessel ischemic white            



                                            matter disease. 3. No            



                                            acute fracture or            



                                            dislocation. 4.            



                                            Decompression            



                                            laminectomies from C3            



                                            through C6 with            



                                            acquired osseous            



                                            fusion of facet            



                                            joints. Persistent            



                                            spondylosis and facet            



                                            arthrosis contribute            



                                            to foraminal            



                                            narrowing. 5. Nodules            



                                            within the thyroid            



                                            gland. Largest            



                                            measures up to 1.1 cm.            



                                            PAGE 2 Signed Report            



                                            (CONTINUED) FAX:            



                                            Lashell Boswell MD            



                                            [phone] [place]:            



                                             St: PRE FAX:            



                                            James Rueda DO            



                                            ----------------------            



                                            ----------------------            



                                            ----------------------            



                                            ------------- Name:            



                                            LY FRANCIS :            



                                            1935 Age/S: 86/F            



                                            [address]:            



                                            [id number] Loc: [place]            



                                            [place] [address]            



                                            Phys: James Rueda DO R2  Acct:            



                                            [id number] Dis Date:            



                                            Status: PRE ER PHONE            



                                            #: [phone] Exam            



                                            Date: 2021 1435            



                                            FAX #: [phone]            



                                            Reason: fall/head            



                                            stiek/ neck pain/pain            



                                            pain/hip pain/ EXAMS:            



                                            CPT CODE: [id number] CT            



                                            HEAD/BRAIN W/O CONT            



                                            60001 <Continued>            



                                            Correlation with prior            



                                            ultrasound if            



                                            available. Consider            



                                            further               



                                            characterization with            



                                            ultrasound on            



                                            nonemergent basis if            



                                            not. ** Electronically            



                                            Signed by Mejia Herrera MD            



                                            on 2021 at 1443            



                                            ** Reported and signed            



                                            by: Mejia Hrerera MD CC:            



                                            Lashell Boswell MD; James Rueda DO            



                                            Technologist: Katie Pelaez  Trnscrd            



                                            Dt/Tm: 2021            



                                            (6976) [email or web address] Orig            



                                            Print D/T: S:            



                                            2021 (4092 PAGE            



                                            3 Signed Report            

 

           - CT HEAD/BRAIN 2021            **********************         

   



           W/O CONT   14:46:00              **********************            



                                            [facility] : 1935            



                                            Sex:                  



                                            F*********************            



                                            **********************            



                                            ***************** FAX:            



                                            Lashell Boswell MD            



                                            [phone] [place]:            



                                             St: [facility] FAX:            



                                            James Rueda DO            



                                            ----------------------            



                                            ----------------------            



                                            ----------------------            



                                            ------------- Name:            



                                            LY FRANCIS :            



                                            1935 Age/S: 86/F            



                                            [address]:            



                                            [id number] Loc: [place] [address]            



                                            Phys: James Rueda            



                                            DO R2 Acct:            



                                            [id number] Dis Date:            



                                            Status: [facility] ER PHONE            



                                            #: [phone]  Exam            



                                            Date: 2021 1435            



                                            FAX #: [phone]            



                                            Reason: fall/head            



                                            stiek/ neck pain/pain            



                                            pain/hip pain/ EXAMS:            



                                            CPT CODE: [id number] CT            



                                            HEAD/BRAIN W/O CONT            



                                            54632 EXAM: 1. CT Head            



                                            without contrast 2. CT            



                                            Cervical spine without            



                                            contrast Location: H24            



                                            HISTORY: Fall, head            



                                            and neck pain            



                                            COMPARISON: None            



                                            available. TECHNIQUE:            



                                            Multiple transaxial            



                                            images of the head and            



                                            cervical spine were            



                                            obtained without            



                                            intravenous contrast.            



                                            Images were            



                                            reformatted to create            



                                            coronal and sagittal            



                                            reconstructions. One            



                                            or more of the            



                                            following dose            



                                            reduction techniques            



                                            were used: Automated            



                                            exposure control,            



                                            adjustment of the mA            



                                            and/or kV according to            



                                            patient size, and/or            



                                            utilization of            



                                            iterative             



                                            reconstruction            



                                            technique. DLP: 1443            



                                            mGy-cm. FINDINGS: Head            



                                            There is no acute            



                                            intracranial            



                                            hemorrhage. There is            



                                            no mass, mass effect,            



                                            midline shift or            



                                            extra-axial fluid            



                                            collection. Brain            



                                            parenchymal volume is            



                                            diffusely decreased            



                                            which is within normal            



                                            limits given patient's            



                                            age. Enlargement of            



                                            the right lateral            



                                            ventricles            



                                            commensurate with            



                                            degree of central            



                                            atrophy.  Gray-white            



                                            differentiation is            



                                            maintained. There is            



                                            no evidence for acute            



                                            major vessel infarct.            



                                            There are areas of            



                                            decreased attenuation            



                                            within subcortical and            



                                            deep white matter            



                                            which are nonspecific.            



                                            Paranasal sinuses,            



                                            mastoid air cells and            



                                            visualized orbital            



                                            contents are within            



                                            normal limits. Bones            



                                            and calvaria and skull            



                                            base are intact. Note            



                                            is made of bilateral            



                                            lens replacements.            



                                            Intraorbital contents            



                                            are within normal            



                                            limits. FINDINGS:            



                                            Cervical Spine            



                                            Postoperative changes            



                                            include decompression            



                                            laminectomies from C3            



                                            PAGE 1 Signed Report            



                                            (CONTINUED) FAX:            



                                            Lashell Boswell MD            



                                            [phone] [place]:            



                                             St: [facility] FAX:            



                                            James Rueda DO            



                                            ----------------------            



                                            ----------------------            



                                            ----------------------            



                                            ------------- Name:            



                                            LY FRANCIS :            



                                            1935 Age/S: 86/F            



                                            [address] Unit:            



                                            [id number] Loc: [place] [address]            



                                            Phys: James Rueda            



                                            DO R2  Acct:            



                                            [id number] Dis Date:            



                                            Status: DEP ER PHONE            



                                            #: [phone] Exam            



                                            Date: 2021 1435            



                                            FAX #: [phone]            



                                            Reason: fall/head            



                                            stiek/ neck pain/pain            



                                            pain/hip pain/ EXAMS:            



                                            CPT CODE: [id number] CT            



                                            HEAD/BRAIN W/O CONT            



                                            39956 (Continued)            



                                            through C6. There is            



                                            acquired, osseous            



                                            fusion of the facet            



                                            joints. There is no            



                                            acute fracture or            



                                            dislocation. There is            



                                            diffuse osteopenia.            



                                            Vertebral body heights            



                                            are maintained. There            



                                            is fusion of the disc            



                                            spaces from C3-C4            



                                            through C5-C6. There            



                                            is mild atrophy of            



                                            paraspinal muscles.            



                                            Nodules are present            



                                            within the thyroid            



                                            gland. Largest on the            



                                            right measures up to            



                                            1.1 cm. Consider            



                                            further               



                                            characterization with            



                                            thyroid ultrasound.            



                                            Occiput-C1: Intact.            



                                            C1-C2: Narrowing of            



                                            the predental space            



                                            and subchondral            



                                            sclerosis.            



                                            Atlantoaxial joint is            



                                            maintained. Mild            



                                            central canal            



                                            stenosis. Hypertrophy            



                                            of the right lateral            



                                            mass contributing to            



                                            moderate right            



                                            foraminal narrowing.            



                                            C2-C3: Disc osteophyte            



                                            complex measuring up            



                                            to 3 mm. Fusion of the            



                                            fused facet joints.            



                                            Mild foraminal            



                                            narrowing. Mild            



                                            central canal            



                                            stenosis.  C3-C4            



                                            through C5-C6:            



                                            Decompression            



                                            laminectomies are            



                                            noted. There is            



                                            persistent spondylotic            



                                            ridging and            



                                            hypertrophy of the            



                                            fused facet joints            



                                            contributing to            



                                            moderate foraminal            



                                            narrowing. C6-C7:            



                                            Partial decompression.            



                                            Moderate to severe            



                                            bilateral facet            



                                            arthropathy and            



                                            foraminal narrowing.            



                                            No canal stenosis.            



                                            C7-T1: Mild bilateral            



                                            facet arthropathy and            



                                            foraminal narrowing.            



                                            No central canal            



                                            stenosis. Pleural and            



                                            parenchymal scarring            



                                            is seen at bilateral            



                                            lung apices.            



                                            IMPRESSION: 1. No            



                                            acute intracranial            



                                            abnormality. No acute            



                                            hemorrhage, mass            



                                            lesion or infarct. 2.            



                                            Senescent changes            



                                            including brain            



                                            parenchymal volume            



                                            loss and chronic small            



                                            vessel ischemic white            



                                            matter disease. 3. No            



                                            acute fracture or            



                                            dislocation. 4.            



                                            Decompression            



                                            laminectomies from C3            



                                            through C6 with            



                                            acquired osseous            



                                            fusion of facet            



                                            joints. Persistent            



                                            spondylosis and facet            



                                            arthrosis contribute            



                                            to foraminal            



                                            narrowing. 5. Nodules            



                                            within the thyroid            



                                            gland. Largest            



                                            measures up to 1.1 cm.            



                                            PAGE 2 Signed Report            



                                            (CONTINUED) FAX:            



                                            Lashell Boswell MD            



                                            [phone] [place]:            



                                             St: [facility] FAX:            



                                            James Rueda DO            



                                            ----------------------            



                                            ----------------------            



                                            ----------------------            



                                            ------------- Name:            



                                            LY FRANCIS [facility] :            



                                            1935 Age/S: 86/F            



                                            [address] Unit:            



                                            [id number]  Loc: [place]            



                                            [place] [address]            



                                            Phys: James Rueda Acct:            



                                            [id number] Dis Date:            



                                            Status: DEP ER  PHONE            



                                            #: [phone] Exam            



                                            Date: 2021 1435            



                                            FAX #: [phone]            



                                            Reason: fall/head            



                                            stiek/ neck pain/pain            



                                            pain/hip pain/ EXAMS:            



                                            CPT CODE: [id number] CT            



                                            HEAD/BRAIN W/O CONT            



                                            [id number] (Continued)            



                                            Correlation with prior            



                                            ultrasound if            



                                            available. Consider            



                                            further               



                                            characterization with            



                                            ultrasound on            



                                            nonemergent basis if            



                                            not. ** Electronically            



                                            Signed by Mejia Herrera MD            



                                            on 2021 at 1445            



                                            ** Reported and signed            



                                            by: Mejia Herrera MD CC:            



                                            Lashell Boswell MD; James Rueda DO            



                                            Technologist: Katie Pelaez Trnscrd            



                                            Dt/Tm: 2021            



                                            (1446) [email or web address] Orig            



                                            Print D/T: S:            



                                            2021 (3319 PAGE            



                                            3 Signed Report            

 

           - CT C-SPINE W/O 2021            **********************        

    



           CONT       14:46:00              **********************            



                                            [facility] 1935            



                                            Sex:                  



                                            F*********************            



                                            **********************            



                                            ***************** FAX:            



                                            Lashell Boswell MD            



                                            [phone] [place]:            



                                             St: [facility] FAX:            



                                            MohitJames DO            



                                            ----------------------            



                                            ----------------------            



                                            ----------------------            



                                            ------------- Name:            



                                            LY FRANCIS :            



                                            1935 Age/S: 86/F            



                                            [address] Unit:            



                                            [id number] Loc: [place] [address]            



                                            Phys: James Rueda            



                                            DO R2 Acct:            



                                            [id number] Dis Date:            



                                            Status: DEP ER PHONE            



                                            #: [phone] Exam            



                                            Date: 2021 1435            



                                            FAX #: [phone]            



                                            Reason: fall/head            



                                            stiek/ neck pain/pain            



                                            pain/hip pain/ EXAMS:            



                                            CPT CODE: [id number] CT            



                                            C-SPINE W/O CONT 11285            



                                            EXAM:  1. CT Head            



                                            without contrast 2. CT            



                                            Cervical spine without            



                                            contrast Location: H24            



                                            HISTORY: Fall, head            



                                            and neck pain            



                                            COMPARISON: None            



                                            available. TECHNIQUE:            



                                            Multiple transaxial            



                                            images of the head and            



                                            cervical spine were            



                                            obtained without            



                                            intravenous contrast.            



                                            Images were            



                                            reformatted to create            



                                            coronal and sagittal            



                                            reconstructions. One            



                                            or more of the            



                                            following dose            



                                            reduction techniques            



                                            were used: Automated            



                                            exposure control,            



                                            adjustment of the mA            



                                            and/or kV according to            



                                            patient size, and/or            



                                            utilization of            



                                            iterative             



                                            reconstruction            



                                            technique. DLP: 1443            



                                            mGy-cm. FINDINGS: Head            



                                            There is no acute            



                                            intracranial            



                                            hemorrhage. There is            



                                            no mass, mass effect,            



                                            midline shift or            



                                            extra-axial fluid            



                                            collection. Brain            



                                            parenchymal volume is            



                                            diffusely decreased            



                                            which is within normal            



                                            limits given patient's            



                                            age. Enlargement of            



                                            the right lateral            



                                            ventricles            



                                            commensurate with            



                                            degree of central            



                                            atrophy. Gray-white            



                                            differentiation is            



                                            maintained. There is            



                                            no evidence for acute            



                                            major vessel infarct.            



                                            There are areas of            



                                            decreased attenuation            



                                            within subcortical and            



                                            deep white matter            



                                            which are nonspecific.            



                                            Paranasal sinuses,            



                                            mastoid air cells and            



                                            visualized orbital            



                                            contents are within            



                                            normal limits. Bones            



                                            and calvaria and skull            



                                            base are intact. Note            



                                            is made of bilateral            



                                            lens replacements.            



                                            Intraorbital contents            



                                            are within normal            



                                            limits. FINDINGS:            



                                            Cervical Spine            



                                            Postoperative changes            



                                            include decompression            



                                            laminectomies from C3            



                                            PAGE 1 Signed Report            



                                            (CONTINUED) FAX:            



                                            Lashell Boswell MD            



                                            [phone] [place]:            



                                             St: DEP FAX:            



                                            MohitJames DO            



                                            ----------------------            



                                            ----------------------            



                                            ----------------------            



                                            ------------- Name:            



                                            LY FRANCIS :            



                                            1935 Age/S: 86/F            



                                            [address] Unit:            



                                            [id number] Loc: [place]            



                                            [place] [address]            



                                            Phys: MohitJames            



                                            DO R2 Acct:            



                                            [id number] Dis Date:            



                                            Status: DEP ER  PHONE            



                                            #: [phone] Exam            



                                            Date: 2021 1435            



                                            FAX #: [phone]            



                                            Reason: fall/head            



                                            stiek/ neck pain/pain            



                                            pain/hip pain/ EXAMS:            



                                            CPT CODE: [id number] CT            



                                            C-SPINE W/O CONT 47867            



                                            (Continued) through            



                                            C6. There is acquired,            



                                            osseous fusion of the            



                                            facet joints. There is            



                                            no acute fracture or            



                                            dislocation. There is            



                                            diffuse osteopenia.            



                                            Vertebral body heights            



                                            are maintained. There            



                                            is fusion of the disc            



                                            spaces from C3-C4            



                                            through C5-C6. There            



                                            is mild atrophy of            



                                            paraspinal muscles.            



                                            Nodules are present            



                                            within the thyroid            



                                            gland. Largest on the            



                                            right measures up to            



                                            1.1 cm. Consider            



                                            further               



                                            characterization with            



                                            thyroid ultrasound.            



                                            Occiput-C1: Intact.            



                                            C1-C2: Narrowing of            



                                            the predental space            



                                            and subchondral            



                                            sclerosis.            



                                            Atlantoaxial joint is            



                                            maintained. Mild            



                                            central canal            



                                            stenosis. Hypertrophy            



                                            of the right lateral            



                                            mass contributing to            



                                            moderate right            



                                            foraminal narrowing.            



                                            C2-C3: Disc osteophyte            



                                            complex measuring up            



                                            to 3 mm. Fusion of the            



                                            fused facet joints.            



                                            Mild foraminal            



                                            narrowing. Mild            



                                            central canal            



                                            stenosis. C3-C4            



                                            through C5-C6:            



                                            Decompression            



                                            laminectomies are            



                                            noted. There is            



                                            persistent spondylotic            



                                            ridging and            



                                            hypertrophy of the            



                                            fused facet joints            



                                            contributing to            



                                            moderate foraminal            



                                            narrowing. C6-C7:            



                                            Partial decompression.            



                                            Moderate to severe            



                                            bilateral facet            



                                            arthropathy and            



                                            foraminal narrowing.            



                                            No canal stenosis.            



                                            C7-T1: Mild bilateral            



                                            facet arthropathy and            



                                            foraminal narrowing.            



                                            No central canal            



                                            stenosis. Pleural and            



                                            parenchymal scarring            



                                            is seen at bilateral            



                                            lung apices.            



                                            IMPRESSION: 1. No            



                                            acute intracranial            



                                            abnormality. No acute            



                                            hemorrhage, mass            



                                            lesion or infarct. 2.            



                                            Senescent changes            



                                            including brain            



                                            parenchymal volume            



                                            loss and chronic small            



                                            vessel ischemic white            



                                            matter disease. 3. No            



                                            acute fracture or            



                                            dislocation. 4.            



                                            Decompression            



                                            laminectomies from C3            



                                            through C6 with            



                                            acquired osseous            



                                            fusion of facet            



                                            joints. Persistent            



                                            spondylosis and facet            



                                            arthrosis contribute            



                                            to foraminal            



                                            narrowing. 5. Nodules            



                                            within the thyroid            



                                            gland. Largest            



                                            measures up to 1.1 cm.            



                                            PAGE 2 Signed Report            



                                            (CONTINUED) FAX:            



                                            Lashell Boswell MD            



                                            [phone] [place]:            



                                             St: [facility] FAX:            



                                            James Rueda DO            



                                            ----------------------            



                                            ----------------------            



                                            ----------------------            



                                            ------------- Name:            



                                            LY FRANCIS            



                                            [place]  :            



                                            1935 Age/S: 86/F            



                                            [address]:            



                                            [id number] Loc: [place] [address]            



                                            Phys: James Rueda DO R2  Acct:            



                                            [id number] Dis Date:            



                                            Status: DEP ER PHONE            



                                            #: [phone] Exam            



                                            Date: 2021 1435            



                                            FAX #: [phone]            



                                            Reason: fall/head            



                                            stiek/ neck pain/pain            



                                            pain/hip pain/ EXAMS:            



                                            CPT CODE: [id number] CT            



                                            C-SPINE W/O CONT            



                                            32480 (Continued)            



                                            Correlation with prior            



                                            ultrasound if            



                                            available. Consider            



                                            further               



                                            characterization with            



                                            ultrasound on            



                                            nonemergent basis if            



                                            not.  **              



                                            Electronically Signed            



                                            by Mejia Herrera MD on            



                                            2021 at 1446 **            



                                            Reported and signed            



                                            by: Mejia Herrera MD CC:            



                                            Lashell Boswell MD; James Rueda DO            



                                            Technologist: Katie Pelaez  Trnscrd            



                                            Dt/Tm: 2021            



                                            (4547) [email or web address] Orig            



                                            Print D/T: S:            



                                            2021 (3129 PAGE            



                                            3 Signed Report            

 

           - XR HIP W/PEL UNI 2020            **********************      

      



           2+V RT     12:47:00              **********************            



                                            [facility] KRZYS[facility]TOF : 1935            



                                            Sex:                  



                                            F*********************            



                                            **********************            



                                            ***************** FAX:            



                                            Octavio Rahman MD            



                                            [phone] [place]:            



                                             St: REG FAX: Alberto Crowley PA-            



                                            [phone]            



                                            ----------------------            



                                            ----------------------            



                                            ----------------------            



                                            ------------- Name:            



                                            LY FRANCIS [facility] :            



                                            1935 Age/S: 85/F            



                                            [address]:            



                                            [id number] Loc: C.[place]            



                                            [place] [address]            



                                            Phys: Octavio Gilliam MD Acct: [id number]            



                                            Dis Date: Status: REG            



                                            CLI PHONE #:            



                                            [phone] Exam            



                                            Date: 2020 1151            



                                            FAX #: [phone]            



                                            Reason: UNSP FRACTURE            



                                            OF RIGHT FEMUR, INIT            



                                            FOR CLOS FX EXAMS:            



                                            CPT CODE: [id number] XR            



                                            HIP W/PEL UNI 2+V RT            



                                            [id number] EXAM: - XR HIP            



                                            W/PEL UNI 2+V RT            



                                            HISTORY: UNSP FRACTURE            



                                            OF RIGHT FEMUR, INIT            



                                            FOR CLOS FX Location            



                                            code:C3 COMPARISON:            



                                            10/4/2020 FINDINGS: AP            



                                            view of the pelvis            



                                            with AP and frog-leg            



                                            lateral view of the            



                                            right hip is provided.            



                                             Right hip            



                                            hemiarthroplasty is            



                                            present with proximal            



                                            cerclage wires. There            



                                            is no periprosthetic            



                                            fracture. The hardware            



                                            appears intact and            



                                            fully engaged. The            



                                            left femoral head is            



                                            located. IMPRESSION:            



                                            1. Right hip            



                                            arthroplasty is            



                                            present without acute            



                                            osseous abnormality.            



                                            ** Electronically            



                                            Signed by Sriram Meneses MD **  ** on            



                                            2020 at 1247 **            



                                            Reported and signed            



                                            by: Sriram Meneses MD CC: Octavio Gilliam MD; Alberto Salomon            



                                            Technologist: MEJIA AGUIAR Trnscrd            



                                            Date/Time/By:            



                                            2020 (2112) :            



                                            By: [email or web address] PAGE 1            



                                            Signed Report FAX: Octavio Rahman MD            



                                            [phone] [place]:            



                                             St: REG FAX: Alberto Crowley-            



                                            [phone]            



                                            ----------------------            



                                            ----------------------            



                                            ----------------------            



                                            ------------- Name:            



                                            LY FRANCIS [facility]            



                                            [place]  :            



                                            1935 Age/S: 85/F            



                                            [address]:            



                                            [id number] Loc: [place] [address]            



                                            Phys: Octavio Gilliam MD  Acct: [id number]            



                                            Dis Date: Status: REG            



                                            CLI PHONE #:            



                                            [phone] Exam            



                                            Date: 2020 1151            



                                            FAX #: [phone]            



                                            Reason: UNSP FRACTURE            



                                            OF RIGHT FEMUR, INIT            



                                            FOR CLOS FX EXAMS: CPT            



                                            CODE: [id number] XR HIP            



                                            W/PEL UNI 2+V RT            



                                            23910 (Continued) Orig            



                                            Print D/T: S:            



                                            2020 (6224) PAGE            



                                            2 Signed Report            









                    COMPREHENSIVE METABOLIC PANEL 2020-10-06 07:08:00 









                      Test Item  Value      Reference Range Interpretation Comme

nts









             SODIUM (test code = NA) 138 mmol/L   137-145      N            

 

             POTASSIUM (test code = K) 3.8 mmol/L   3.4-5.0      N            

 

             CHLORIDE (test code = CL) 113 mmol/L          H            

 

             CARBON DIOXIDE (test code = 20 mmol/L    22-30        L            



             CO2)                                                

 

             GLUCOSE (test code = GLU) 99 mg/dL            N            

 

             BLOOD UREA NITROGEN (test code 16 mg/dL     7-17         N         

   



             = BUN)                                              

 

             GLOMERULAR FILTRATION RATE 63           >60                       T

he estimated glomerular



             (test code = GFR)                                        filtration

 rate is computed



                                                                 usingpatient ra

ce, age (>18),



                                                                 sex, and serum 

creatinine. If



                                                                 anyof the neede

d data elements



                                                                 are missing the

 Laboratory cannot



                                                                 compute an amanda

mation of the



                                                                 glomerular filt

ration rate.

 

             CREATININE (test code = CREAT) 0.9 mg/dL    0.5-1.0      N         

   

 

             TOTAL PROTEIN (test code = 5.4 g/dL     6.3-8.2      L            



             PROT)                                               

 

             ALBUMIN (test code = ALB) 2.8 g/dL     3.5-5.0      L            

 

             CALCIUM (test code = CA) 8.2 mg/dL    8.4-10.2     L            

 

             BILIRUBIN TOTAL (test code = 0.7 mg/dL    0.2-1.3      N           

 "A positive bias may occur for



             BILT)                                               patients taking

 Eltrombopag(a



                                                                 bone marrow sti

mulant used to



                                                                 treat thrombocy

topenia



                                                                 andaplastic ane

maicol)."

 

             BILIRUBIN CONJUGATED (test 0 mg/dL      0-0.3        N            "

A positive bias may occur for



             code = BILCON)                                        patients taki

ng Eltrombopag(a



                                                                 bone marrow sti

mulant used to



                                                                 treat thrombocy

topenia



                                                                 andaplastic ane

maicol)."



                                                                 ***************

******************



                                                                 ***************

************CONJUG



                                                                 ATED BILIRUBIN 

IS THE REPLACEMENT



                                                                 ASSAY FOR



                                                                 DIRECTBILIRUBIN

.*****************



                                                                 ***************

******************



                                                                 **********

 

             BILIRUBIN UNCONJUGATED (test 0.5 mg/dL    0-1.1        N           

 



             code = BILUNC)                                        

 

             SGOT/AST (test code = AST) 38 U/L       15-46        N            

 

             SGPT/ALT (test code = ALT) 11 U/L       0-34         N            

 

             ALKALINE PHOSPHATASE (test 41 U/L              N            



             code = ALKP)                                        



COMPREHENSIVE METABOLIC PANEL2020-10-06 07:07:00





             Test Item    Value        Reference Range Interpretation Comments

 

             SODIUM (test code = 138 mmol/L   137-145      N            



             NA)                                                 

 

             POTASSIUM (test code 3.8 mmol/L   3.4-5.0      N            



             = K)                                                

 

             CHLORIDE (test code 113 mmol/L          H            



             = CL)                                               

 

             CARBON DIOXIDE (test 20 mmol/L    22-30        L            



             code = CO2)                                         

 

             GLUCOSE (test code = 99 mg/dL            N            



             GLU)                                                

 

             BLOOD UREA NITROGEN 16 mg/dL     7-17         N            



             (test code = BUN)                                        

 

             GLOMERULAR   63           >60                       The estimated



             FILTRATION RATE                                        glomerular f

iltration



             (test code = GFR)                                        rate is co

mputed



                                                                 usingpatient ra

ce, age



                                                                 (>18), sex, and

 serum



                                                                 creatinine. If 

anyof



                                                                 the needed data



                                                                 elements are mi

ssing



                                                                 the Laboratory 

cannot



                                                                 compute an amanda

mation



                                                                 of the glomerul

ar



                                                                 filtration rate

.

 

             CREATININE (test 0.9 mg/dL    0.5-1.0      N            



             code = CREAT)                                        

 

             TOTAL PROTEIN (test 5.4 g/dL     6.3-8.2      L            



             code = PROT)                                        

 

             ALBUMIN (test code = 2.8 g/dL     3.5-5.0      L            



             ALB)                                                

 

             CALCIUM (test code = 8.2 mg/dL    8.4-10.2     L            



             CA)                                                 

 

             BILIRUBIN TOTAL 0.7 mg/dL    0.2-1.3      N            "A positive 

bias may



             (test code = BILT)                                        occur for

 patients



                                                                 taking Eltrombo

pag(a



                                                                 bone marrow sti

mulant



                                                                 used to treat



                                                                 thrombocytopeni

a



                                                                 andaplastic ane

maicol)."

 

             BILIRUBIN CONJUGATED 0 mg/dL      0-0.3        N            "A posi

tive bias may



             (test code = BILCON)                                        occur f

or patients



                                                                 taking Eltrombo

pag(a



                                                                 bone marrow sti

mulant



                                                                 used to treat



                                                                 thrombocytopeni

a



                                                                 andaplastic ane

maicol)."



                                                                 ***************

********



                                                                 ***************

********



                                                                 **************C

ONJUGATE



                                                                 D BILIRUBIN IS 

THE



                                                                 REPLACEMENT ASS

AY FOR



                                                                 DIRECTBILIRUBIN

.*******



                                                                 ***************

********



                                                                 ***************

********



                                                                 *******

 

             BILIRUBIN    0.5 mg/dL    0-1.1        N            



             UNCONJUGATED (test                                        



             code = BILUNC)                                        

 

             SGOT/AST (test code 38 U/L       15-46        N            



             = AST)                                              

 

             SGPT/ALT (test code  U/L         0-34                      



             = ALT)                                              

 

             ALKALINE PHOSPHATASE 41 U/L              N            



             (test code = ALKP)                                        



CBC W/AUTO DIFF2020-10-06 06:52:00





             Test Item    Value        Reference Range Interpretation Comments

 

             WHITE BLOOD CELL (test code = 7.1 x10 3/uL 5.0-12.0     N          

  



             WBC)                                                

 

             RED BLOOD CELL (test code = 3.07 x10 6/uL 4.20-5.40    L           

 



             RBC)                                                

 

             HEMOGLOBIN (test code = HGB) 9.3 g/dL     12.0-16.0    L           

 

 

             HEMATOCRIT (test code = HCT) 30.4 %       36.0-46.0    L           

 

 

             MEAN CELL VOLUME (test code = 99 fL        81-99        N          

  



             MCV)                                                

 

             MEAN CELL HGB (test code = MCH) 30.3 pg      27-31        N        

    

 

             MEAN CELL HGB CONCENTRATION 30.6 g/dL    33-37        L            



             (test code = MCHC)                                        

 

             RED CELL DISTRIBUTION WIDTH 12.7 %       11.5-15.5    N            



             (test code = RDW)                                        

 

             PLATELET COUNT (test code = 165 x10 3/uL 130-400      N            



             PLT)                                                

 

             MEAN PLATELET VOLUME (test code 8.9 fL       9.4-16.4     L        

    



             = MPV)                                              

 

             NEUTROPHIL % (test code = NT%) 73.0 %       43-65        H         

   

 

             IMMATURE GRANULOCYTE % (test 0.3 %        0.0-2.0      N           

 



             code = IG%)                                         

 

             LYMPHOCYTE % (test code = LY%) 12.9 %       20.5-45.5    L         

   

 

             MONOCYTE % (test code = MO%) 9.3 %        5.5-11.7     N           

 

 

             EOSINOPHIL % (test code = EO%) 4.1 %        0.9-2.9      H         

   

 

             BASOPHIL % (test code = BA%) 0.4 %        0.2-1.0      N           

 

 

             NUCLEATED RBC % (test code = 0.0 %        0-1.0        N           

 



             NRBC%)                                              

 

             NEUTROPHIL # (test code = NT#) 5.21 x10 3/uL 2.2-4.8      H        

    

 

             IMMATURE GRANULOCYTE # (test 0.02 x10 3/uL 0-0.03       N          

  



             code = IG#)                                         

 

             LYMPHOCYTE # (test code = LY#) 0.92 x10 3/uL 1.3-2.9      L        

    

 

             MONOCYTE # (test code = MO#) 0.66 x10 3/uL 0.3-0.8      N          

  

 

             EOSINOPHIL # (test code = EO#) 0.29 x10 3/uL 0.0-0.2      H        

    

 

             BASOPHIL # (test code = BA#) 0.03 x10 3/uL 0.0-0.1      N          

  



HGB HCT2020-10-05 12:12:00





             Test Item    Value        Reference Range Interpretation Comments

 

             HEMOGLOBIN (test code = HGB) 9.2 g/dL     12.0-16.0    L           

 

 

             HEMATOCRIT (test code = HCT) 28.0 %       36.0-46.0    L           

 



BASIC METABOLIC PANEL2020-10-05 08:05:00





             Test Item    Value        Reference Range Interpretation Comments

 

             SODIUM (test code = 133 mmol/L   137-145      L            



             NA)                                                 

 

             POTASSIUM (test code 4.5 mmol/L   3.4-5.0      N            



             = K)                                                

 

             CHLORIDE (test code = 106 mmol/L          N            



             CL)                                                 

 

             CARBON DIOXIDE (test 23 mmol/L    22-30        N            



             code = CO2)                                         

 

             GLUCOSE (test code = 108 mg/dL           H            



             GLU)                                                

 

             BLOOD UREA NITROGEN 22 mg/dL     7-17         H            



             (test code = BUN)                                        

 

             GLOMERULAR FILTRATION 73           >60                       The es

timated



             RATE (test code =                                        glomerular

 filtration



             GFR)                                                rate is compute

d



                                                                 usingpatient ra

ce, age



                                                                 (>18), sex, and

 serum



                                                                 creatinine. If 

anyof



                                                                 the needed data



                                                                 elements are mi

ssing



                                                                 the Laboratory 

cannot



                                                                 compute an amanda

mation



                                                                 of the glomerul

ar



                                                                 filtration rate

.

 

             CREATININE (test code 0.8 mg/dL    0.5-1.0      N            



             = CREAT)                                            

 

             CALCIUM (test code = 8.0 mg/dL    8.4-10.2     L            



             CA)                                                 



COMPREHENSIVE METABOLIC PANEL2020-10-05 08:05:00





             Test Item    Value        Reference Range Interpretation Comments

 

             TOTAL PROTEIN (test 4.9 g/dL     6.3-8.2      L            



             code = PROT)                                        

 

             ALBUMIN (test code = 2.7 g/dL     3.5-5.0      L            



             ALB)                                                

 

             BILIRUBIN TOTAL 0.5 mg/dL    0.2-1.3      N            "A positive 

bias may



             (test code = BILT)                                        occur for

 patients



                                                                 taking Eltrombo

pag(a



                                                                 bone marrow sti

mulant



                                                                 used to treat



                                                                 thrombocytopeni

a



                                                                 andaplastic ane

maicol)."

 

             BILIRUBIN CONJUGATED 0 mg/dL      0-0.3        N            "A posi

tive bias may



             (test code = BILCON)                                        occur f

or patients



                                                                 taking Eltrombo

pag(a



                                                                 bone marrow sti

mulant



                                                                 used to treat



                                                                 thrombocytopeni

a



                                                                 andaplastic ane

maicol)."



                                                                 ***************

*********



                                                                 ***************

*********



                                                                 ************CON

JUGATED



                                                                 BILIRUBIN IS TH

E



                                                                 REPLACEMENT ASS

AY FOR



                                                                 DIRECTBILIRUBIN

.********



                                                                 ***************

*********



                                                                 ***************

*********



                                                                 ****

 

             BILIRUBIN    0.5 mg/dL    0-1.1        N            



             UNCONJUGATED (test                                        



             code = BILUNC)                                        

 

             SGOT/AST (test code 41 U/L       15-46        N            



             = AST)                                              

 

             SGPT/ALT (test code 15 U/L       0-34         N            



             = ALT)                                              

 

             ALKALINE PHOSPHATASE 38 U/L              N            



             (test code = ALKP)                                        



CBC W/AUTO DIFF2020-10-05 07:36:00





             Test Item    Value        Reference Range Interpretation Comments

 

             WHITE BLOOD CELL (test code = 8.3 x10 3/uL 5.0-12.0     N          

  



             WBC)                                                

 

             RED BLOOD CELL (test code = 3.02 x10 6/uL 4.20-5.40    L           

 



             RBC)                                                

 

             HEMOGLOBIN (test code = HGB) 9.3 g/dL     12.0-16.0    L           

 

 

             HEMATOCRIT (test code = HCT) 29.6 %       36.0-46.0    L           

 

 

             MEAN CELL VOLUME (test code = 98 fL        81-99        N          

  



             MCV)                                                

 

             MEAN CELL HGB (test code = MCH) 30.8 pg      27-31        N        

    

 

             MEAN CELL HGB CONCENTRATION 31.4 g/dL    33-37        L            



             (test code = MCHC)                                        

 

             RED CELL DISTRIBUTION WIDTH 12.4 %       11.5-15.5    N            



             (test code = RDW)                                        

 

             PLATELET COUNT (test code = 161 x10 3/uL 130-400      N            



             PLT)                                                

 

             MEAN PLATELET VOLUME (test code 8.7 fL       9.4-16.4     L        

    



             = MPV)                                              

 

             NEUTROPHIL % (test code = NT%) 78.6 %       43-65        H         

   

 

             IMMATURE GRANULOCYTE % (test 0.2 %        0.0-2.0      N           

 



             code = IG%)                                         

 

             LYMPHOCYTE % (test code = LY%) 11.5 %       20.5-45.5    L         

   

 

             MONOCYTE % (test code = MO%) 8.8 %        5.5-11.7     N           

 

 

             EOSINOPHIL % (test code = EO%) 0.8 %        0.9-2.9      L         

   

 

             BASOPHIL % (test code = BA%) 0.1 %        0.2-1.0      L           

 

 

             NUCLEATED RBC % (test code = 0.0 %        0-1.0        N           

 



             NRBC%)                                              

 

             NEUTROPHIL # (test code = NT#) 6.53 x10 3/uL 2.2-4.8      H        

    

 

             IMMATURE GRANULOCYTE # (test 0.02 x10 3/uL 0-0.03       N          

  



             code = IG#)                                         

 

             LYMPHOCYTE # (test code = LY#) 0.96 x10 3/uL 1.3-2.9      L        

    

 

             MONOCYTE # (test code = MO#) 0.73 x10 3/uL 0.3-0.8      N          

  

 

             EOSINOPHIL # (test code = EO#) 0.07 x10 3/uL 0.0-0.2      N        

    

 

             BASOPHIL # (test code = BA#) 0.01 x10 3/uL 0.0-0.1      N          

  



BASIC METABOLIC PANEL2020-10-04 18:24:00





             Test Item    Value        Reference Range Interpretation Comments

 

             SODIUM (test code = 134 mmol/L   137-145      L            



             NA)                                                 

 

             POTASSIUM (test code 4.2 mmol/L   3.4-5.0      N            



             = K)                                                

 

             CHLORIDE (test code = 105 mmol/L          N            



             CL)                                                 

 

             CARBON DIOXIDE (test 24 mmol/L    22-30        N            



             code = CO2)                                         

 

             GLUCOSE (test code = 117 mg/dL           H            



             GLU)                                                

 

             BLOOD UREA NITROGEN 16 mg/dL     7-17         N            



             (test code = BUN)                                        

 

             GLOMERULAR FILTRATION 101          >60                       The es

timated



             RATE (test code =                                        glomerular

 filtration



             GFR)                                                rate is compute

d



                                                                 usingpatient ra

ce, age



                                                                 (>18), sex, and

 serum



                                                                 creatinine. If 

anyof



                                                                 the needed data



                                                                 elements are mi

ssing



                                                                 the Laboratory 

cannot



                                                                 compute an amanda

mation



                                                                 of the glomerul

ar



                                                                 filtration rate

.

 

             CREATININE (test code 0.6 mg/dL    0.5-1.0      N            



             = CREAT)                                            

 

             CALCIUM (test code = 8.6 mg/dL    8.4-10.2     N            



             CA)                                                 



[id number] 18:24:00





             Test Item    Value        Reference Range Interpretation Comments

 

             PHOSPHOROUS (test code = PHOS) 3.5 mg/dL    2.5-4.5      N         

   



MAGNESIUM2020-10-04 18:24:00





             Test Item    Value        Reference Range Interpretation Comments

 

             MAGNESIUM (test code = MAG) 1.8 mg/dL    1.6-2.3      N            



BASIC METABOLIC PANEL2020-10-04 18:15:00





             Test Item    Value        Reference Range Interpretation Comments

 

             SODIUM (test code = 134 mmol/L   137-145      L            



             NA)                                                 

 

             POTASSIUM (test code 4.2 mmol/L   3.4-5.0      N            



             = K)                                                

 

             CHLORIDE (test code = 105 mmol/L          N            



             CL)                                                 

 

             CARBON DIOXIDE (test 24 mmol/L    22-30        N            



             code = CO2)                                         

 

             GLUCOSE (test code = 117 mg/dL           H            



             GLU)                                                

 

             BLOOD UREA NITROGEN 16 mg/dL     7-17         N            



             (test code = BUN)                                        

 

             GLOMERULAR FILTRATION 101          >60                       The es

timated



             RATE (test code =                                        glomerular

 filtration



             GFR)                                                rate is compute

d



                                                                 usingpatient ra

ce, age



                                                                 (>18), sex, and

 serum



                                                                 creatinine. If 

anyof



                                                                 the needed data



                                                                 elements are mi

ssing



                                                                 the Laboratory 

cannot



                                                                 compute an amanda

mation



                                                                 of the glomerul

ar



                                                                 filtration rate

.

 

             CREATININE (test code 0.6 mg/dL    0.5-1.0      N            



             = CREAT)                                            

 

             CALCIUM (test code = 8.6 mg/dL    8.4-10.2     N            



             CA)                                                 



[id number] 18:15:00





             Test Item    Value        Reference Range Interpretation Comments

 

             PHOSPHOROUS (test code = PHOS)  mg/dL       2.5-4.5                

   



MAGNESIUM2020-10-04 18:15:00





             Test Item    Value        Reference Range Interpretation Comments

 

             MAGNESIUM (test code = MAG)  mg/dL       1.6-2.3                   



CBC W/AUTO DIFF2020-10-04 17:42:00





             Test Item    Value        Reference Range Interpretation Comments

 

             WHITE BLOOD CELL (test code = 10.3 x10 3/uL 5.0-12.0     N         

   



             WBC)                                                

 

             RED BLOOD CELL (test code = 3.98 x10 6/uL 4.20-5.40    L           

 



             RBC)                                                

 

             HEMOGLOBIN (test code = HGB) 12.7 g/dL    12.0-16.0    N           

 

 

             HEMATOCRIT (test code = HCT) 38.0 %       36.0-46.0    N           

 

 

             MEAN CELL VOLUME (test code = 96 fL        81-99        N          

  



             MCV)                                                

 

             MEAN CELL HGB (test code = MCH) 31.9 pg      27-31        H        

    

 

             MEAN CELL HGB CONCENTRATION 33.4 g/dL    33-37        N            



             (test code = MCHC)                                        

 

             RED CELL DISTRIBUTION WIDTH 12.0 %       11.5-15.5    N            



             (test code = RDW)                                        

 

             PLATELET COUNT (test code = 194 x10 3/uL 130-400      N            



             PLT)                                                

 

             MEAN PLATELET VOLUME (test code 8.8 fL       9.4-16.4     L        

    



             = MPV)                                              

 

             NEUTROPHIL % (test code = NT%) 90.3 %       43-65        H         

   

 

             IMMATURE GRANULOCYTE % (test 0.2 %        0.0-2.0      N           

 



             code = IG%)                                         

 

             LYMPHOCYTE % (test code = LY%) 5.9 %        20.5-45.5    L         

   

 

             MONOCYTE % (test code = MO%) 3.5 %        5.5-11.7     L           

 

 

             EOSINOPHIL % (test code = EO%) 0.0 %        0.9-2.9      L         

   

 

             BASOPHIL % (test code = BA%) 0.1 %        0.2-1.0      L           

 

 

             NUCLEATED RBC % (test code = 0.0 %        0-1.0        N           

 



             NRBC%)                                              

 

             NEUTROPHIL # (test code = NT#) 9.29 x10 3/uL 2.2-4.8      H        

    

 

             IMMATURE GRANULOCYTE # (test 0.02 x10 3/uL 0-0.03       N          

  



             code = IG#)                                         

 

             LYMPHOCYTE # (test code = LY#) 0.61 x10 3/uL 1.3-2.9      L        

    

 

             MONOCYTE # (test code = MO#) 0.36 x10 3/uL 0.3-0.8      N          

  

 

             EOSINOPHIL # (test code = EO#) 0.00 x10 3/uL 0.0-0.2      N        

    

 

             BASOPHIL # (test code = BA#) 0.01 x10 3/uL 0.0-0.1      N          

  



- XR PELVIS 1/2 VIEWS2020-10-04 14:50:00 FAX: Roxane Gentile MD [phone] 
[place]:  St: ADM FAX: Robert Truong [phone] FAX: Alberto Crowley- [phone] 
------------------------------------------------------------------------------- 
Name: LY FRANCIS [facility] : 1935 Age/S: 84/F [address] 
#: [id number] Loc: [address] [address] Phys: Robert Truong MD 
Acct: [id number] Dis Date: Status: ADM IN PHONE #: [phone] Exam Date: 
10/04/2020 1400 FAX #: [phone] Reason: RIGHT HIPREPLACEMENT EXAMS: CPT 
CODE: [id number] XR PELVIS 1/2 VIEWS 10023  EXAM: - XR PELVIS 1/2 VIEWS INDICAT
ION: RIGHT HIP REPLACEMENT Location: T 18. COMPARISON: Radiograph performed 
earlier the same day.  TECHNIQUE: 2 views. FINDINGS: Patient is post right hip 
arthroplasty for fixation of the right femur neck fracture. Postoperative soft 
tissue swelling and gas seen. IMPRESSION: Right hip arthroplasty. **
Electronically Signed by Tejinder Johnson MD on 10/04/2020 at 1450 ** Reported and 
signed by: Tejinder Johnson MD CC: Roxane Julian MD; Robert Truong MD; Alberto Salomon Technologist: Manjinder Lopez Trnscrd Date/Time/By: 
10/04/2020 (1450) : By: [email or web address] PAGE 1 Signed Report  FAX: Roxane Gentile MD
[phone] [place]:  St: ADM FAX: Robert Truong [phone] FAX: OCTAVIO SalomonAlberto T PA- [phone] 
------------------------------------------------------------------------------- 
Name: LY FRANCIS [facility] : 1935 Age/S: 84/F [address]  Unit
#: [id number] Loc: [address] [address] Phys: Robert Truong MD 
Acct: [id number] Dis Date:  Status:ADM IN PHONE #: [phone] Exam Date: 
10/04/2020 1400 FAX #: [phone] Reason: RIGHT HIP REPLACEMENT  EXAMS: CPT 
CODE: [id number] XR PELVIS 1/2 VIEWS 10637 (Continued) Orig Print D/T: S: 
10/04/2020(1454) PAGE 2 Signed ReportCOVID 19 Asymptomatic IH AG2020-10-04 
08:35:00





             Test Item    Value        Reference Range Interpretation Comments

 

             COVID 19 Asymptomatic IH AG (test NEGATIVE     Negative            

      



             code = COVNONPUIAG)                                        



BASIC METABOLIC PANEL2020-10-04 06:15:00





             Test Item    Value        Reference Range Interpretation Comments

 

             SODIUM (test code = 136 mmol/L   137-145      L            



             NA)                                                 

 

             POTASSIUM (test code 4.0 mmol/L   3.4-5.0      N            



             = K)                                                

 

             CHLORIDE (test code = 107 mmol/L          N            



             CL)                                                 

 

             CARBON DIOXIDE (test 18 mmol/L    22-30        L            



             code = CO2)                                         

 

             GLUCOSE (test code = 131 mg/dL           H            



             GLU)                                                

 

             BLOOD UREA NITROGEN 18 mg/dL     7-17         H            



             (test code = BUN)                                        

 

             GLOMERULAR FILTRATION 125          >60                       The es

timated



             RATE (test code =                                        glomerular

 filtration



             GFR)                                                rate is compute

d



                                                                 usingpatient ra

ce, age



                                                                 (>18), sex, and

 serum



                                                                 creatinine. If 

anyof



                                                                 the needed data



                                                                 elements are mi

ssing



                                                                 the Laboratory 

cannot



                                                                 compute an amanda

mation



                                                                 of the glomerul

ar



                                                                 filtration rate

.

 

             CREATININE (test code 0.5 mg/dL    0.5-1.0      N            



             = CREAT)                                            

 

             CALCIUM (test code = 8.7 mg/dL    8.4-10.2     N            



             CA)                                                 



[id number] 06:15:00





             Test Item    Value        Reference Range Interpretation Comments

 

             PHOSPHOROUS (test code = PHOS) 3.3 mg/dL    2.5-4.5      N         

   



MAGNESIUM2020-10-04 06:15:00





             Test Item    Value        Reference Range Interpretation Comments

 

             MAGNESIUM (test code = MAG) 1.8 mg/dL    1.6-2.3      N            



BASIC METABOLIC PANEL2020-10-04 06:14:00





             Test Item    Value        Reference Range Interpretation Comments

 

             SODIUM (test code = 136 mmol/L   137-145      L            



             NA)                                                 

 

             POTASSIUM (test code 4.0 mmol/L   3.4-5.0      N            



             = K)                                                

 

             CHLORIDE (test code = 107 mmol/L          N            



             CL)                                                 

 

             CARBON DIOXIDE (test 18 mmol/L    22-30        L            



             code = CO2)                                         

 

             GLUCOSE (test code = 131 mg/dL           H            



             GLU)                                                

 

             BLOOD UREA NITROGEN 18 mg/dL     7-17         H            



             (test code = BUN)                                        

 

             GLOMERULAR FILTRATION 125          >60                       The es

timated



             RATE (test code =                                        glomerular

 filtration



             GFR)                                                rate is compute

d



                                                                 usingpatient ra

ce, age



                                                                 (>18), sex, and

 serum



                                                                 creatinine. If 

anyof



                                                                 the needed data



                                                                 elements are mi

ssing



                                                                 the Laboratory 

cannot



                                                                 compute an amanda

mation



                                                                 of the glomerul

ar



                                                                 filtration rate

.

 

             CREATININE (test code 0.5 mg/dL    0.5-1.0      N            



             = CREAT)                                            

 

             CALCIUM (test code = 8.7 mg/dL    8.4-10.2     N            



             CA)                                                 



[id number] 06:14:00





             Test Item    Value        Reference Range Interpretation Comments

 

             PHOSPHOROUS (test code = PHOS)  mg/dL       2.5-4.5                

   



MAGNESIUM2020-10-04 06:14:00





             Test Item    Value        Reference Range Interpretation Comments

 

             MAGNESIUM (test code = MAG)  mg/dL       1.6-2.3                   



CBC W/AUTO DIFF2020-10-04 05:58:00





             Test Item    Value        Reference Range Interpretation Comments

 

             WHITE BLOOD CELL (test code = 11.0 x10 3/uL 5.0-12.0     N         

   



             WBC)                                                

 

             RED BLOOD CELL (test code = 4.07 x10 6/uL 4.20-5.40    L           

 



             RBC)                                                

 

             HEMOGLOBIN (test code = HGB) 12.8 g/dL    12.0-16.0    N           

 

 

             HEMATOCRIT (test code = HCT) 39.3 %       36.0-46.0    N           

 

 

             MEAN CELL VOLUME (test code = 97 fL        81-99        N          

  



             MCV)                                                

 

             MEAN CELL HGB (test code = MCH) 31.4 pg      27-31        H        

    

 

             MEAN CELL HGB CONCENTRATION 32.6 g/dL    33-37        L            



             (test code = MCHC)                                        

 

             RED CELL DISTRIBUTION WIDTH 12.0 %       11.5-15.5    N            



             (test code = RDW)                                        

 

             PLATELET COUNT (test code = 215 x10 3/uL 130-400      N            



             PLT)                                                

 

             MEAN PLATELET VOLUME (test code 8.9 fL       9.4-16.4     L        

    



             = MPV)                                              

 

             NEUTROPHIL % (test code = NT%) 90.6 %       43-65        H         

   

 

             IMMATURE GRANULOCYTE % (test 0.4 %        0.0-2.0      N           

 



             code = IG%)                                         

 

             LYMPHOCYTE % (test code = LY%) 5.7 %        20.5-45.5    L         

   

 

             MONOCYTE % (test code = MO%) 3.1 %        5.5-11.7     L           

 

 

             EOSINOPHIL % (test code = EO%) 0.0 %        0.9-2.9      L         

   

 

             BASOPHIL % (test code = BA%) 0.2 %        0.2-1.0      N           

 

 

             NUCLEATED RBC % (test code = 0.0 %        0-1.0        N           

 



             NRBC%)                                              

 

             NEUTROPHIL # (test code = NT#) 9.95 x10 3/uL 2.2-4.8      H        

    

 

             IMMATURE GRANULOCYTE # (test 0.04 x10 3/uL 0-0.03       H          

  



             code = IG#)                                         

 

             LYMPHOCYTE # (test code = LY#) 0.63 x10 3/uL 1.3-2.9      L        

    

 

             MONOCYTE # (test code = MO#) 0.34 x10 3/uL 0.3-0.8      N          

  

 

             EOSINOPHIL # (test code = EO#) 0.00 x10 3/uL 0.0-0.2      N        

    

 

             BASOPHIL # (test code = BA#) 0.02 x10 3/uL 0.0-0.1      N          

  



- XR KNEE 1 OR 2 V BI2020-10-04 03:33:00 FAX: Rocky Donohue MD [place]: [facility]:
ADM FAX: OCTAVIO SalomonAlberto JOHN DAO- [phone] ----------------
--------------------------------------------------------------- Name: LY FRANCIS [facility] :1935 Age/S: 84/F [address] Unit #: [id number]  
Loc: [address] [address] Phys: Rocky Donohue MD R1 Acct: [id number] Dis
Date: Status: ADM IN  PHONE #: [phone] Exam Date: 10/04/2020 020 FAX #: 
[phone] Reason: fall, pain with ROM EXAMS:  CPT CODE: [id number] XR KNEE 1 
OR 2 VBI 51468 AFTER HOURS SERVICE ON: 10/4/2020 3:29 AM Left Femur, 4 Views 
Location Code M12 History: fall, pain with ROM Findings: There is osteopenia. 
There is no fracture or dislocation. There is no periosteal elevation. No lytic 
or blastic lesions. Impression: Osteopenia. No fracture. *****************
***************************** AFTER HOURS SERVICE ON: 10/4/2020 3:29 AM Right 
Femur, 4 Views Location Code M12 History: fall, pain with ROM Findings: There is
an impacted mildly displaced femoral neck fracture. There is osteopenia. Femoral
head is intact. Impression: Mildly displaced impacted right femoral neck 
fracture. ********************************************** PAGE 1 Signed Report 
(CONTINUED) FAX: Rocky Donohue MD [place]: [facility]: ADM FAX: Alberto Crowley- [phone] ------------------
------------------------------------------------------------- Name: LY FRANCIS 
[facility] : 1935 Age/S: 84/F [address] Unit #: [id number] Loc:
[address] [address] Phys: Rocky Donohue MD R1  Acct: [id number] Dis 
Date: Status: ADM IN PHONE #: [phone] Exam Date: 10/04/15121695 FAX #: 
[phone] Reason: fall, pain with ROM EXAMS: CPT CODE: [id number] XR KNEE 1 OR
2 [place] BI [address]  (Continued) AFTER HOURS SERVICE ON: 10/4/2020 3:29 AM Right Knee, 
3 Views Location Code M12  History: fall, pain with ROM Findings: There is 
osteopenia. Advanced degenerative tricompartmental joint space narrowing is 
noted. There is no fracture. Femoral condyles and tibial plateaus are unremarkab
le. Impression: Advanced osteoarthritic changes. No fracture.  
*********************************** AFTER HOURS SERVICE ON: 10/4/2020 3:29 AM 
Left Knee, 3 Views Location Code M12 History: fall, pain with ROM Findings: 
There is osteopenia. Advanced degenerative tricompartmental joint space 
narrowing is noted. There is no fracture. Femoral condyles and tibial plateaus 
are unremarkable. Impression: PAGE 2 Signed Report  (CONTINUED) FAX: 
Rocky Donohue MD [place]: [facility]: ADM FAX: Alberto Crowley- [phone] 
------------------------------------------------------------------------------- 
Name: LY FRANCIS : 1935 Age/S: 84/F [address] Unit 
#: [id number] Loc: [address] [place] [address] Phys: Rocky Donohue MD R1 Acct: 
[id number] Dis Date: Status: ADM IN  PHONE #: [phone] Exam Date: 
10/04/2020 0200 FAX #: [phone] Reason: fall, pain with ROM EXAMS:  CPT 
CODE: [id number] XR KNEE 1 OR 2 [place] [address] (Continued) Advanced osteoarthritic 
changes. No fracture. ********************************************** AFTER HOURS
SERVICE ON: 10/4/2020 3:29 AM  Pelvis and Bilateral Hips, 5 Views Location Code 
M12 History: fall, pain with ROM Findings: There is osteopenia. There is an 
impacted right femoral neck fracture. There is bilateral femoral acetabular 
joint space narrowing. Pubic symphysis and pubic rami are unremarkable.  
Impression: Impacted mildly displaced right femoral neck fracture. ** 
Electronically Signed by Lindsey Gomez MD ** ** on 10/04/2020 at 0333 ** Re
ported and signed by: Lindsey Gomez MD CC: Rocky Donohue MD; Alberto Salomon  Technologist: Janneth MunozRT (R) Trnscrd Date/Time/By: 
10/04/2020 (0333) : By: [email or web address] PAGE 3 Signed Report FAX: Rocky Donohue MD
[place]:  St: [facility] FAX: Alberto Crowley [phone] -------------
------------------------------------------------------------------ Name: 
LY FRANCIS [facility] : 1935 Age/S: 84/F [address] Unit #: 
[id number] Loc: [address] [place] [address] Phys: Rocky Donohue MD R1 Acct: 
[id number] Dis Date: Status: ADM IN PHONE #: [phone] Exam Date: 10/04/2
020 0200 FAX #: [phone] Reason: fall, pain with ROM EXAMS: CPT CODE: 
[id number] XR KNEE 1 OR 2 VBI 68064 (Continued) Orig Print D/T: S: 10/04/2020 
(0336) PAGE 4 Signed Report- XR HIP BI W/PELVIS2020-10-04 03:33:00 FAX: 
Rocky Donohue MD R  [place]: [facility]: [facility] FAX: Alberto Crowley- [phone] 
------------------------------------------------------------------------------- 
Name: LY FRANCIS  [facility] : 1935 Age/S: 84/F [address] Unit
#: [id number] Loc: [address] [place] [address] Phys: Rocky Donohue MD R1 Acct: 
[id number] Dis Date: Status: ADM IN PHONE #: [phone] Exam Date: 10/04/20
20 0200 FAX #: [phone] Reason: fall, pain with ROM EXAMS: CPT CODE: 
[id number] XR HIP BI W/PELVIS [address] AFTER HOURS SERVICE ON: 10/4/2020 3:29 AM 
Left Femur, 4 Views Location Code M12 History: fall, pain with ROM Findings: 
There is osteopenia. There is no fracture or dislocation. There is no periosteal
elevation. No lytic or blastic lesions. Impression: Osteopenia. No fracture. 
**********************************************  AFTER HOURS SERVICE ON: 
10/4/2020 3:29 AM Right Femur, 4 Views LocationCode M12 History: fall, pain with
ROM  Findings: There is an impacted mildly displaced femoral neck fracture. 
There is osteopenia. Femoral head is intact. Impression:  Mildly displaced 
impacted right femoral neck fracture. 
********************************************** PAGE 1 Signed Report (CONTINUED)
FAX: Rocky Donohue MD [place]: [facility]: [facility] FAX: Alberto Crowley PA- 
[phone] -----------------
-------------------------------------------------------------- Name: LY FRNACIS
[facility] : 1935 Age/S: 84/F [address] Unit #: [id number] Loc:
[address] [address] Phys: Rocky Donohue MD R1 Acct: [id number] Dis 
Date: Status: ADM IN PHONE #: [phone] Exam Date: 10/04/69952687 FAX #: 
[phone] Reason: fall, pain with ROM  EXAMS: CPT CODE: [id number] XR HIP BI 
W/PELVIS [address] (Continued)  AFTER HOURS SERVICE ON: 10/4/2020 3:29 AM Right 
Knee, 3 Views Location Code M12 History: fall, pain with ROM Findings:  There is
osteopenia. Advanced degenerative tricompartmental joint space narrowing is 
noted. There is no fracture. Femoral condyles and tibial plateaus are unremarkab
le. Impression: Advanced osteoarthritic changes. No fracture. 
***********************************  AFTER HOURS SERVICE ON: 10/4/2020 3:29 AM 
Left Knee, 3 Views Location Code M12 History: fall, pain with ROM  Findings: 
There is osteopenia. Advanced degenerative tricompartmental joint space 
narrowing isnoted. There is no fracture. Femoral condyles and  tibial plateaus 
are unremarkable. Impression: PAGE 2 Signed Report (CONTINUED) FAX: 
Rocky Donohue MD  [place]: [facility]: [facility] FAX: Alberto Crowley-[phone] 
------------------------------------------------------------------------------- 
Name: LY FRANCIS  [facility] : 1935 Age/S: 84/F [address] Unit
#: [id number] Loc: [address] [address] Phys: Rocky Donohue MD R1 Acct: 
[id number] Dis Date: Status: ADM IN PHONE #: [phone] Exam Date:  
10/04/2020 0200 FAX #: [phone] Reason: fall, pain with ROM EXAMS: CPT CODE:
[id number] XR HIP BI W/PELVIS 12275 (Continued) Advanced osteoarthritic changes. 
No fracture. ********************************************** AFTER HOURS SERVICE 
ON: 10/4/2020 3:29 AM Pelvis and Bilateral Hips, 5 Views Location Code M12 
History: fall, pain with ROM Findings: There is osteopenia. There is an impacted
right femoral neck fracture. There is bilateral femoral acetabular joint space 
narrowing. Pubic symphysis and pubic rami are unremarkable. Impression: Impacted
mildly displaced right femoral neck fracture. ** Electronically Signed by 
Lindsey Gomez MD ** ** on 10/04/2020 at 0333 ** Reported and signed by: 
Lindsey Gomez MD CC: Rocky Donohue MD; Alberto Salomon Technologis
t: Janneth uMnoz (R) Trnscrd Date/Time/By: 10/04/2020 (333) : By: 
[email or web address] PAGE 3 Signed Report  FAX: Rocky Donohue MD [place]: [facility]: ADM 
FAX: Alberto Crowley [phone] --------------
----------------------------------------------------------------- Name: 
LY FRANCIS [facility] : 1935 Age/S: 84/F [address] Unit #: 
[id number] Loc: [address] [address] Phys: Rocky Donohue MD R1 Acct: 
[id number] Dis Date: Status: ADM IN  PHONE #: [phone] Exam Date: 10/04/
2020 020 FAX #: [phone] Reason: fall, pain with ROM EXAMS:  CPT CODE: 
[id number] XR HIP BI W/PELVIS [address] (Continued) Orig Print D/T: S: 10/04/2020 
(336)  PAGE 4 Signed Report- XR FEMUR MIN 2 VW RT2020-10-04 03:33:00 FAX: 
Rocky Donohue MD R [place]: [facility]: ADM FAX: Alberto Crowley [phone] 
------------------------------------------------------------------------------- 
[facility] [place] [facility]1935 Age/S: 84/F [address] Unit 
#: [id number] Loc: [place] [address] Phys: Rocky Donohue MD R1 Acct: 
[id number] Dis Date: Status: ADM IN PHONE #: [phone] Exam Date: 
10/04/2020 0200 FAX #: [phone] Reason: fall, pain with ROM EXAMS: CPT CODE:
[id number] XR FEMUR MIN 2 VW RT [address]  AFTER HOURS SERVICE ON: 10/4/2020 3:29 AM 
Left Femur, 4 Views Location Code M12 History: fall, pain with ROM Findings:  
There is osteopenia. There is no fracture or dislocation. There is no periosteal
elevation. No lytic or blastic lesions. Impression: Osteopenia. No fracture. 
********************************************** AFTER HOURS SERVICE ON: 10/4/2020
3:29 AM  Right Femur, 4 Views Location Code M12 History: fall, pain with ROM 
Findings: There is an impacted mildly displaced femoral neckfracture. There is 
osteopenia. Femoral head is intact. Impression: Mildly displaced impacted right 
femoral neck fracture. ********************************************** PAGE 1 
Signed Report (CONTINUED)FAX: Rocky Donohue MD R [place]:  St: [facility] FAX: Alberto Crowley- [phone] -----------------
-------------------------------------------------------------- Name: LY FRANCIS
 [facility] [place] [facility]1935 Age/S: 84/F [address] Unit #: [id number] Loc:
[place] [address]  Phys: Rocky Donohue MD R1 Acct: [id number] Dis 
Date: Status: ADM IN PHONE #: [phone] Exam Date: 10/04/2020 0200 FAX #: 
[phone] Reason: fall, pain with ROM EXAMS: CPT CODE:  [id number] XR FEMUR 
MIN 2 VWRT 15162 (Continued) AFTER HOURS SERVICE ON: 10/4/2020 3:29 AM Right 
Knee, 3 Views Location Code [id number]: fall, pain with ROM Findings: There is 
osteopenia. Advanced degenerative tricompartmental joint space narrowing is 
noted. There is no fracture. Femoral condyles and tibial plateaus are unremark
able. Impression: Advanced osteoarthritic changes. No fracture. 
*********************************** AFTER HOURS SERVICE ON: 10/4/2020 3:29 AM 
Left Knee, 3 Views Location Code M12 History: fall, pain with ROM Findings: 
There is osteopenia. Advanced degenerative tricompartmental joint space 
narrowing isnoted. There is no fracture. Femoral condyles and tibial plateaus 
are unremarkable. Impression: PAGE2 Signed Report (CONTINUED) FAX: 
Rocky Donohue MD R [place]: [facility]: [facility] FAX: Alberto Crowley PA- [phone] 
------------------------------------------------------------------------------- 
Name: LY FRANCIS [facility] : 1935 Age/S: 84/F [address] Unit 
#: [id number] Loc: [place] [address] Phys: Rocky Donohue MD R1  Acct: 
[id number] Dis Date: Status: ADM IN PHONE #: [phone] Exam Date: 
10/04/2020 0200 FAX #: [phone] Reason: fall, pain with ROM EXAMS: CPT CODE:
[id number] XR FEMUR MIN 2 VW RT [address]  (Continued) Advanced osteoarthritic 
changes. No fracture. **********************************************  AFTER 
HOURS SERVICE ON: 10/4/2020 3:29 AM Pelvis and Bilateral Hips, 5 Views Location 
Code M12 History: fall, pain with ROM  Findings: There is osteopenia. There is 
an impacted right femoral neck fracture. There is bilateral femoral acetabular 
joint space narrowing. Pubic symphysis and pubic rami are unremarkable. 
Impression: Impacted mildly displaced right femoral neck fracture. ** 
Electronically Signed by Lindsey Gomez MD ** ** on 10/04/2020 at 0333 ** R
eported and signed by: Lindsey Gomez MD CC: Rocky Donohue MD; Alberto Salomon Technologist: RT Ninoska (R) Trnscrd Date/Time/By: 10/04/2020
(0333) : By: [email or web address] PAGE 3 Signed Report FAX: Rocky Donohue MD R [place]: [facility]: [facility] FAX: Alberto Crowley- [phone] -------------
------------------------------------------------------------------ Name: 
LY FRANCIS [facility]  : 1935 Age/S: 84/F [address] Unit #: 
[id number] Loc: [address] [address] Phys: Rocky Donohue MD R1  Acct: 
[id number] Dis Date: Status: ADM IN PHONE #: [phone] Exam Date: 10/04
/2020 0200  FAX #: [phone] Reason: fall, pain with ROM EXAMS: CPT CODE: 
[id number] XR FEMUR MIN 2 VW RT [address] (Continued) Orig Print D/T: S: 10/04/2020 
(0336)  PAGE 4 Signed Report- XR FEMUR MIN 2 VW LT2020-10-04 03:33:00 FAX: 
Rocky Donohue MD R [place]: [facility]: ADM FAX: Alberto Crowley- [phone] 
------------------------------------------------------------------------------- 
Name: LY FRANCIS [facility] :1935 Age/S: 84/F [address] Unit 
#: [id number] Loc: [address] [address] Phys: Rocky Donohue MD R1 Acct: 
[id number] Dis Date: Status: ADM IN  PHONE #: [phone] Exam Date: 
10/04/2020 020 FAX #: [phone] Reason: fall, pain with ROM EXAMS:  CPT 
CODE: [id number] XR FEMUR MIN 2 VWLT 14882 AFTER HOURS SERVICE ON: 10/4/2020 3:29
AM Left Femur, 4 Views Location Code M12 History: fall, pain with ROM Findings: 
There is osteopenia. There is no fracture or dislocation.  There is no per
iosteal elevation. No lytic or blastic lesions. Impression: Osteopenia. No 
fracture. ********************************************** AFTER HOURS SERVICE ON:
10/4/2020 3:29 AM Right Femur, 4 Views Location Code M12 History: fall, pain 
with ROM Findings: There is an impacted mildly displaced femoral neckfracture. 
There is osteopenia. Femoral head is intact. Impression: Mildly displaced 
impacted right femoral neck fracture. 
********************************************** PAGE 1 Signed Report (CONTINUED)
FAX: Rocky Donohue MD [place]: [facility]: [facility] FAX: Alberto Crowley PA- 
[phone] -----------------
-------------------------------------------------------------- Name: LY FRANCIS
[facility] : 1935 Age/S: 84/F [address] Unit #: [id number] Loc:
[address] [address] Phys: Rocky Donohue MD R1  Acct: [id number] Dis 
Date: Status: ADM IN PHONE #: [phone] Exam Date: 10/04/2020 0200 FAX #: 
[phone] Reason: fall, pain with ROM EXAMS: CPT CODE: [id number] XR FEMUR MIN
2 VW LT [address]  (Continued) AFTER HOURS SERVICE ON: 10/4/2020 3:29 AM Right Knee,
3 Views Location Code [id number]: fall, pain with ROM Findings: There is 
osteopenia. Advanced degenerative tricompartmental joint space narrowing is 
noted. There is no fracture. Femoral condyles and tibial plateaus are unremark
able. Impression: Advanced osteoarthritic changes. No fracture.  
***********************************AFTER HOURS SERVICE ON: 10/4/2020 3:29 AM 
Left Knee, 3 Views Location Code M12 History: fall, pain with ROM Findings: 
There is osteopenia. Advanced degenerative tricompartmental joint space 
narrowing is noted. There is no fracture. Femoral condyles and tibial plateaus 
are unremarkable. Impression: PAGE 2 Signed Report  (CONTINUED) FAX: 
Rocky Donohue MD [place]:  St: [facility] FAX: Alberto Crowley-[phone] 
------------------------------------------------------------------------------- 
Name: LY FRANCIS [facility] : 1935 Age/S: 84/F [address] 
#: [id number] Loc: [address] [address] Phys: Rocky Donohue MD R1 Acct: 
[id number] Dis Date: Status: ADM IN  PHONE #: [phone] Exam Date: 
10/04/2020 0200 FAX #: [phone] Reason: fall, pain with ROM EXAMS: CPT CODE:
[id number] XR FEMUR MIN 2 VW LT [address]165 (Continued) Advanced osteoarthritic 
changes. No fracture. ********************************************** AFTER HOURS
SERVICE ON: 10/4/2020 3:29 AM  Pelvis and Bilateral Hips, 5 Views Location Code 
M12 History: fall, pain with ROM Findings: There is osteopenia. There is an 
impacted right femoral neck fracture. There is bilateral femoral acetabular 
joint space narrowing. Pubic symphysis and pubic rami are unremarkable.  
Impression: Impacted mildly displaced right femoral neck fracture. ** 
Electronically Signed by Lindsey Gomez MD ** ** on 10/04/2020 at 0333 **
Reported and signed by: Lindsey Gomez MD CC: Rocky Donohue MD; Alberto Salomon  Technologist: RT Ninoska (CALLUM) Trnscrd Date/Time/By: 
10/04/2020 (0333) : By: [email or web address] PAGE 3 Signed Report FAX: Rocky Donohue MD
[place]:  St: [facility] FAX: OCTAVIO TorranceAlberto oliver PA- [phone] -----------
-------------------------------------------------------------------- Name: 
[facility]: 1935 Age/S: 84/F [address] Unit #: 
[id number] Loc: [address] [address] Phys: Rocky Donohue MD R1 Acct: 
[id number] Dis Date: Status: ADM IN PHONE #: [phone] Exam Date: 10/04
/2020 020 FAX #: [phone] Reason: fall, pain with ROM EXAMS: CPT CODE: 
[id number] XR FEMUR MIN 2VW LT [address]4724 (Continued) Orig Print D/T: S: 10/04/2020 
(0336) PAGE 4 Signed ReportLIPID PROFILE (CORONARY RISK)2020-10-04 02:23:00





             Test Item    Value        Reference Range Interpretation Comments

 

             TRIGLYCERIDES (test 113 mg/dL                              TRIGLYCE

RIDES



             code = TRIG)                                        REFERENCE



                                                                 RANGE:Normal: <

150



                                                                 mg/dLBorderline



                                                                 High: 150-199



                                                                 mg/dLHigh: 200-

499



                                                                 mg/dLVery High:



                                                                 >=500 mg/dL

 

             CHOLESTEROL (test 210 mg/dL                              CHOLESTERO

L



             code = CHOL)                                        REFERENCE



                                                                 RANGE:DESIRABLE

: <



                                                                 200 mg/dLBORDER

LINE:



                                                                 200-239 mg/dLHI

GH:



                                                                 >=240 mg/dL

 

             HDL CHOLESTEROL (test 70 mg/dL     40-59        H            



             code = HDL)                                         

 

             LIPOPROTEIN LDL (test 135.47 mg/dL 32-99        H            



             code = LDLC)                                        

 

             CORONARY RISK FACTOR 3.00                                    CHOL/H

DL RISK MALE:



             (test code = RISK)                                        1/2 AVG 3

.43 FEMALE:



                                                                 1/2 AVG 3.27 AV

G



                                                                 4.97 AVG 4.44 2

X AVG



                                                                 9.55 2X AVG 7.0

5 3X



                                                                 AVG 23.39 3X  A

VG



                                                                 11.04~~~~~~~~~~

~~~~~



                                                                 ~~~~~~~~~~~~~~~

~~~~~



                                                                 ~~~~~~~~~~~~~~~

~~~~~



                                                                 ~~~~~National



                                                                 Cholesterol



                                                                 Education (NCEP

)



                                                                 Guidelines:~~~~

~~~~~



                                                                 ~~~~~~~~~~~~~~~

~~~~~



                                                                 ~~~~~~~~~~~~~~~

~~~~~



                                                                 ~~~~~~~~~~~  **

HDL



                                                                 Cholesterol**<4

0mg/d



                                                                 L: HDL Choleste

rol



                                                                 (Major risk fac

tor



                                                                 for CHD)>60mg/d

L:



                                                                 HDL Cholesterol



                                                                 (Negative risk



                                                                 factor for



                                                                 CHD)40-59mg/dL:



                                                                 Borderline Risk



                                                                 **LDL



                                                                 Cholesterol**<1

00mg/



                                                                 dL: Desirable L

DL-C



                                                                 [id number]

0-159



                                                                 mg/dL: Borderli

ne



                                                                 High Risk LDL-C



                                                                 [id number]

0-189



                                                                 mg/dL: High ris

k



                                                                 LDL-C concentra

tion



                                                                 HDL-LDL Cholest

almaz



                                                                 is affected by 

a



                                                                 number of facto

rs



                                                                 suchas smoking,

 age



                                                                 and



                                                                 sex.~~~~~~~~~~~

~~~~~



                                                                 ~~~~~~~~~~~~~~~

~~~~~



                                                                 ~~~~~~~~~~~~~~~

~~~~~



                                                                 ~~~~



LIPID PROFILE (CORONARY RISK)2020-10-04 02:11:00





             Test Item    Value        Reference Range Interpretation Comments

 

             TRIGLYCERIDES (test 113 mg/dL                              TRIGLYCE

RIDES



             code = TRIG)                                        REFERENCE



                                                                 RANGE:Normal: <

150



                                                                 mg/dLBorderline

 High:



                                                                 150-199 mg/dLHi

gh:



                                                                 200-499 mg/dLVe

ry



                                                                 High: >=500 mg/

dL

 

             CHOLESTEROL (test code 210 mg/dL                              TOSHA

STEROL REFERENCE



             = CHOL)                                             RANGE:DESIRABLE

: < 200



                                                                 mg/dLBORDERLINE

:



                                                                 200-239 mg/dLHI

GH:



                                                                 >=240 mg/dL

 

             HDL CHOLESTEROL (test 70 mg/dL     40-59        H            



             code = HDL)                                         

 

             LIPOPROTEIN LDL (test  mg/dL       32-99                     



             code = LDLC)                                        

 

             CORONARY RISK FACTOR 3.00                                    CHOL/H

DL RISK MALE:



             (test code = RISK)                                        1/2 AVG 3

.43 FEMALE:



                                                                 1/2 AVG 3.27 AV

G 4.97



                                                                 AVG 4.44 2X AVG

 9.55



                                                                 2X AVG 7.05 3X 

AVG



                                                                 23.39 3X AVG



                                                                 11.04~~~~~~~~~~

~~~~~~~



                                                                 ~~~~~~~~~~~~~~~

~~~~~~~



                                                                 ~~~~~~~~~~~~~~~

~~~~~~N



                                                                 ational Cholest

almaz



                                                                 Education (NCEP

)



                                                                 Guidelines:~~~~

~~~~~~~



                                                                 ~~~~~~~~~~~~~~~

~~~~~~~



                                                                 ~~~~~~~~~~~~~~~

~~~~~~~



                                                                 ~~~~~ **HDL



                                                                 Cholesterol**<4

0mg/dL:



                                                                 HDL Cholesterol

 (Major



                                                                 risk factor for



                                                                 CHD)>60mg/dL: H

DL



                                                                 Cholesterol (Ne

gative



                                                                 risk factor for



                                                                 CHD)40-59mg/dL:



                                                                 Borderline Risk

 **LDL



                                                                 Cholesterol**<1

00mg/dL



                                                                 : Desirable LDL

-C



                                                                 [id number]

0-159mg



                                                                 /dL: Borderline

 High



                                                                 Risk LDL-C



                                                                 [id number]

0-189mg



                                                                 /dL: High risk 

LDL-C



                                                                 concentration H

DL-LDL



                                                                 Cholesterol is



                                                                 affected by a n

umber



                                                                 of factors such

as



                                                                 smoking, age an

d



                                                                 sex.~~~~~~~~~~~

~~~~~~~



                                                                 ~~~~~~~~~~~~~~~

~~~~~~~



                                                                 ~~~~~~~~~~~~~~~

~~~~~



- CT HEAD/BRAIN W/O CONT2020-10-04 00:44:00 FAX: Alberto Crowley- 
[phone] [place]: [address] St: REG------------------------------------------
------------------------------------- Name: LY FRANCIS [facility] : 
1935 Age/S: 84/F [address] Unit: [id number] Loc: [place] 
[address] Phys: Lexie Manzanares MD Acct: [id number] Dis Date: Status: REG ER 
PHONE #: [phone] Exam Date: 10/04/2020 0013 FAX #: [phone] Reason: 
fall EXAMS: CPT CODE: [id number] CT HEAD/BRAIN W/O CONT 78020 Location: H3 CT 
head, conducted on10/04/20 COMPARISON EXAMS: None of the brain TECHNIQUE: CT 
examination of the brain was performed without contrast on a helical scanner. 
Scanning conducted from skull base through the vertex in the axial plane 
acquiring contiguous 5mm slice thickness . The examination was performed on 
updated helical CT scanner utilizing low-dose radiation technique. Automatic 
exposure control timing was utilized to minimize radiation dose. CLINICAL 
HISTORY: Trauma, headache. Patient presenting to the emergency room. Patient 
status post fall FINDINGS: No positive mass-effect, midline shift, extra-axial 
fluid collections or intracranial hemorrhages seen. In particular, no 
subarachnoid hemorrhage is identified. No intra or extra-axial masses. No skull 
fracture is seen. The globes are intact. No acute territorial infarction is 
seen. No cerebral edema is seen. No significant sinus disease is seen. No 
pneumocephalus is seen or definite acute traumatic injury. There is atrophy age 
appropriate with significant moderate degree at least of relatively chronic-
appearing microvascular changes.  IMPRESSION: No acute finding ** Electronically
Signed by Hayley Lipscomb MD ** ** on 10/04/2020 at 0044 **  Reported and
signed by: Hayley Lipscomb MD CC: Alberto Salomon  Technologist: 
Aline Ramírez; ALICIA ZEE Trnscrd Dt/Tm: 10/04/2020 (004) [email or web address] Orig
Print D/T: S: 10/04/2020 (0047  PAGE 1 Signed Report- CT C-SPINE W/O CONT
2020-10-04 00:41:00 FAX: Alberto Crowley- [phone] [place]:  St: 
REG------------------------------------------
------------------------------------- Name: LY FRANCIS : 
1935 Age/S: 84/F [address] Unit: [id number] Loc: [place] 
[address] Phys: Lexie Manzanares MD Acct: [id number] Dis Date: Status: REG ER  
PHONE #: [phone] Exam Date: 10/04/2020 0013 FAX #: [phone] Reason: 
fall EXAMS:  CPT CODE: [id number] CT C-SPINE W/O CONT 14905 Location:  CT 
cervical spine, conducted on 10/04/20  TECHNIQUE: CT examination of the cervical
spine without contrast was performed jesica helical scanner. 2-D imaging was 
acquired using MPR software on CT workstation by the CT technologist. Scanning 
conducted in axial plane from skull base down to upper thoracic spine with 
acquisition of 2.5 mm contiguous axial slice thickness acquired . The 
examination was performed with low radiation dose technique. Automatic exposure 
control was utilized to reduce radiation dose. Examination conducted on updated 
helical CT scanner CLINICAL HISTORY: Neck pain. Trauma , patient presenting to 
the emergency room FINDINGS: There is no acute fracture or subluxation. There is
postoperative change with decompressive procedure identified from C3-C4 are 
downwards to C6-C7. Partial fusion of this spaces at C3-C4 downwards to at least
C5-C6 and osseous fusion masses identified involving cervical facet joints. 
Cervical spondylosis is seen with multilevel foraminal narrowing but without 
compromise of the canal centrally. Assessment of the skull base unremarkable. 
Prevertebral soft tissues unremarkable. The atlano axial joint is unremarkable .
No pneumothorax or rib fracture is seen IMPRESSION: No acute fracture or 
traumatic malalignment of the spinal lines Postop changes Multilevel foraminal 
narrowing due to spondylosis Cervical spondylosis ** Electronically Signed by 
Hyaley Lipscomb MD ** **  at 0041 ** Reported and signed by: 
Hayley Lipscomb MD PAGE 1 Signed Report (CONTINUED) FAX: Alberto Crowley- [phone] [place]:  St: REG--------------------------------------
----------------------------------------- Name: LY FRANCIS [facility] : 
1935 Age/S: 84/F [address]  Unit: [id number] Loc: [place] 
[address] Phys: Lexie Manzanares MD Acct: [id number] Dis Date:  Status: REG ER 
PHONE #: [phone] Exam Date: 10/04/2020 0013 FAX #: [phone] Reason: 
fall  EXAMS: CPT CODE: [id number] CT C-SPINE W/O CONT 82587 (Continued)  CC: 
Alberto Howell Technologist: Aline Ramírez; ALICIA ZEE Trnscrd Dt/Tm:
10/04/2020 (0041) [email or web address] Orig Print D/T: S: 10/04/2020 (0044  PAGE 2 Signed 
ReportCOMPREHENSIVE METABOLIC PANEL2020-10-03 23:50:00





             Test Item    Value        Reference Range Interpretation Comments

 

             SODIUM (test code = 138 mmol/L   137-145      N            



             NA)                                                 

 

             POTASSIUM (test code 4.9 mmol/L   3.4-5.0      N            



             = K)                                                

 

             CHLORIDE (test code 110 mmol/L          H            



             = CL)                                               

 

             CARBON DIOXIDE (test 20 mmol/L    22-30        L            



             code = CO2)                                         

 

             GLUCOSE (test code = 117 mg/dL           H            



             GLU)                                                

 

             BLOOD UREA NITROGEN 18 mg/dL     7-17         H            



             (test code = BUN)                                        

 

             GLOMERULAR   101          >60                       The estimated



             FILTRATION RATE                                        glomerular f

iltration



             (test code = GFR)                                        rate is co

mputed



                                                                 usingpatient ra

ce, age



                                                                 (>18), sex, and

 serum



                                                                 creatinine. If 

anyof



                                                                 the needed data



                                                                 elements are mi

ssing



                                                                 the Laboratory 

cannot



                                                                 compute an amanda

mation



                                                                 of the glomerul

ar



                                                                 filtration rate

.

 

             CREATININE (test 0.6 mg/dL    0.5-1.0      N            



             code = CREAT)                                        

 

             TOTAL PROTEIN (test 7.1 g/dL     6.3-8.2      N            



             code = PROT)                                        

 

             ALBUMIN (test code = 3.9 g/dL     3.5-5.0      N            



             ALB)                                                

 

             CALCIUM (test code = 8.6 mg/dL    8.4-10.2     N            



             CA)                                                 

 

             BILIRUBIN TOTAL 0.9 mg/dL    0.2-1.3      N            "A positive 

bias may



             (test code = BILT)                                        occur for

 patients



                                                                 taking Eltrombo

pag(a



                                                                 bone marrow sti

mulant



                                                                 used to treat



                                                                 thrombocytopeni

a



                                                                 andaplastic ane

maicol)."

 

             BILIRUBIN CONJUGATED 0 mg/dL      0-0.3        N            "A posi

tive bias may



             (test code = BILCON)                                        occur f

or patients



                                                                 taking Eltrombo

pag(a



                                                                 bone marrow sti

mulant



                                                                 used to treat



                                                                 thrombocytopeni

a



                                                                 andaplastic ane

maicol)."



                                                                 ***************

********



                                                                 ***************

********



                                                                 **************C

ONJUGATE



                                                                 D BILIRUBIN IS 

THE



                                                                 REPLACEMENT ASS

AY FOR



                                                                 DIRECTBILIRUBIN

.*******



                                                                 ***************

********



                                                                 ***************

********



                                                                 *******

 

             BILIRUBIN    0.4 mg/dL    0-1.1        N            



             UNCONJUGATED (test                                        



             code = BILUNC)                                        

 

             SGOT/AST (test code 47 U/L       15-46        H            



             = AST)                                              

 

             SGPT/ALT (test code 20 U/L       0-34         N            



             = ALT)                                              

 

             ALKALINE PHOSPHATASE 50 U/L              N            



             (test code = ALKP)                                        



[id number]- 23:50:00





             Test Item    Value        Reference Range Interpretation Comments

 

             ALCOHOL (test code = < 10 mg/dL   <10                        ~~~~~~

~~~~~~~~~~~~~~~



             ALC)                                                ~~~~~~~~~~~~~~~

~~~~~~~



                                                                 ~~~~~~~ RESULTS

 ARE TO



                                                                 BE USED FOR MED

ICAL



                                                                 PURPOSES ONLY.F

OR



                                                                 LEGAL PURPOSES 

THE



                                                                 SPECIMEN MUST B

E



                                                                 COLLECTED BY A 

CHAINOF



                                                                 CUSTODY. LEGAL 

TESTING



                                                                 IS NOT PERFORME

D BY



                                                                 THIS FACILITY.



                                                                 ~~~~~~~~~~~~~~~

~~~~~~~



                                                                 ~~~~~~~~~~~~~~~

~~~~~~~



                                                                 ~~~~~~



PROTHROMBIN TIME2020-10-03 23:43:00





             Test Item    Value        Reference Range Interpretation Comments

 

             PROTHROMBIN TIME 11.3 SECONDS 9.2-12.1     N            



             PATIENT (test code =                                        



             PTP)                                                

 

             INTERNATIONAL NORMAL 1.0                                    The INR

 is to be used



             RATIO (test code =                                        only for 

monitoring



             INR)                                                ORAL



                                                                 ANTICOAGULANTTH

ERAPY.



                                                                 Indication INR 

Value1.



                                                                 Prophylaxis/kaushik

atment



                                                                 of: Venous Thro

mbosis,



                                                                 Pulmonary Embol

ism 2.0



                                                                 - 3.02. Prevent

ion of



                                                                 systemic emboli

sm



                                                                 from: Tissue he

art



                                                                 valves  2.0 - 3

.0



                                                                 Acute myocardia

l



                                                                 infarction (to 

present



                                                                 systemic emboli

sm)*



                                                                 2.0 - 3.0 Valvu

lar



                                                                 heart disease 2

.0 -



                                                                 3.0 Atrial



                                                                 fibrillation 2.

0 -



                                                                 3.03. [employer]

l



                                                                 prosthetic valv

es



                                                                 (high risk) 2.5

 - 3.5



                                                                 * If oral



                                                                 anticoagulant t

herapy



                                                                 is elected to



                                                                 preventrecurren

t



                                                                 myocardial infa

rction,



                                                                 an INR of 2.5-3

.5



                                                                 isrecommended,



                                                                 consistent with

 Food



                                                                 and Drug



                                                                 Administrationr

ecommen



                                                                 dations.



THROMBOPLASTIN TIME PARTIAL2020-10-03 23:43:00





             Test Item    Value        Reference Range Interpretation Comments

 

             THROMBOPLASTIN TIME 18.9 SECONDS 23.4-37.0    L              Therap

eutic Range



             PARTIAL (test code =                                        for Hep

broderick



             PTT)                                                EFFECTIVE 7/10/

13



                                                                 Heparin IU/mL a

PTT



                                                                 Seconds0.3 64.3

0.7



                                                                 88.8



- XR CHEST 1 -10-03 23:34:00 FAX: Alberto Crowley pa- [phone] 
[place]:  St: REG------------------------------------------
------------------------------------- Name: LY FRANCIS : 
1935 Age/S: 84/F [address] Unit #: [id number] [place]
[address] Phys: Lexie Manzanares MD  Acct: [id number] Dis Date: Status: REG ER 
PHONE #: [phone] Exam Date: 10/03/2020 2301 FAX #: [phone]: fall
EXAMS: CPT CODE: [id number] XR CHEST 1 V 96495  AFTER HOURS SERVICE ON: 10/3/2020
11:34 PM AP Portable Chest Location Code M12 HISTORY: fall FINDINGS:  There are 
extensive coarse chronic appearing bilateral interstitial markings. There are no
pleural effusions. There is no pneumothorax. Cardiac silhouette and mediastinum 
appear within normal limits. IMPRESSION: No active pulmonary findings. ** 
Electronically Signed by Lindsey Gomez MD ** ** on 10/03/2020 at 2334 ** 
Reported and signed by: Lindsey Gomez MD CC: Alberto Salomon  
Technologist: Kiara Rodriges Trnscrd Date/Time/By: 10/03/2020 (5506) : By: 
[email or web address] PAGE 1 Signed Report FAX: Alberto Crowley- [phone] [place]: 
 St: 
REG-----------------------------------------------------------------------------

-- Name: LY FRANCIS : 1935 Age/S: 84/F [address]  
Unit #: [id number] Loc: [place] [address] Phys: Lexie Manzanares MD Acct:
[id number] Dis Date: Status: REG ER PHONE #: [phone] Exam Date: 
10/03/2020 2301 FAX #: [phone] Reason: fall  EXAMS: CPT CODE: [id number] XR 
CHEST 1 V 71186 (Continued) Orig Print D/T: S: 10/03/2020 (8209) PAGE 2 Signed 
Report- XR HIP W/PEL UNI 2+V RT2020-10-03 23:24:00 FAX: Alberto Crowley- 
[phone] [place]:  St: PRE------------------------------------------
------------------------------------- Name: LY FRANCIS [facility] : 
1935 Age/S: 84/F [address] Unit #: [id number] Loc: [place]
[address] Phys: Lexie Manzanares MD Acct: [id number] Dis Date: Status: PRE ER 
PHONE #: [phone] Exam Date: 10/03/2020 2301 FAX #: [phone] Reason: 
fall EXAMS: CPT CODE: [id number] XR HIP W/PEL UNI 2+V RT 32657  AFTER HOURS 
SERVICE ON: 10/3/2020 11:23 PM Right Hip, 3 Views Location Code M12 History: 
fall Findings: There is an impacted right femoral neck fracture with 
approximately 1 severe displacement. There is osteopenia. There is narrowing of 
both femoral acetabular joint spaces. Visualized pubic rami are intact. 
Impression: Mildly displaced impacted right femoral neck fracture. ** 
Electronically Signed by Lindsey Gomez MD **  ** on 10/03/2020 at 0644 ** 
Reported and signed by: Lindsey Gomez MD CC: Alberto Salomon  Techno
logist: Kiara Rodriges Trnscrd Date/Time/By: 10/03/2020 (7457) : By: [email or web address] 
PAGE 1 Signed ReportFAX: Alberto Crowley- [phone] [place]:  St: 
PRE-------------------------------------------
------------------------------------ Name: LY FRANCIS : 
1935 Age/S: 84/F [address] Unit #: [id number] Loc: [place]
[address] Phys: Lexie Manzanares MD Acct: [id number] Dis Date: Status: PRE ER 
PHONE #: [phone] Exam Date: 10/03/2020 230 FAX #: [phone] Reason: 
fall  EXAMS: CPT CODE: [id number] XR HIP W/PEL UNI 2+V RT 19936 (Continued) Orig 
Print D/T: S: 10/03/2020 (8544) PAGE 2 Signed ReportCBC W/AUTO DIFF2020-10-03 
23:23:00





             Test Item    Value        Reference Range Interpretation Comments

 

             WHITE BLOOD CELL (test code = 10.3 x10 3/uL 5.0-12.0     N         

   



             WBC)                                                

 

             RED BLOOD CELL (test code = 4.12 x10 6/uL 4.20-5.40    L           

 



             RBC)                                                

 

             HEMOGLOBIN (test code = HGB) 12.9 g/dL    12.0-16.0    N           

 

 

             HEMATOCRIT (test code = HCT) 38.8 %       36.0-46.0    N           

 

 

             MEAN CELL VOLUME (test code = 94 fL        81-99        N          

  



             MCV)                                                

 

             MEAN CELL HGB (test code = MCH) 31.3 pg      27-31        H        

    

 

             MEAN CELL HGB CONCENTRATION 33.2 g/dL    33-37        N            



             (test code = MCHC)                                        

 

             RED CELL DISTRIBUTION WIDTH 11.9 %       11.5-15.5    N            



             (test code = RDW)                                        

 

             PLATELET COUNT (test code = 238 x10 3/uL 130-400      N            



             PLT)                                                

 

             MEAN PLATELET VOLUME (test code 9.0 fL       9.4-16.4     L        

    



             = MPV)                                              

 

             NEUTROPHIL % (test code = NT%) 78.9 %       43-65        H         

   

 

             IMMATURE GRANULOCYTE % (test 0.2 %        0.0-2.0      N           

 



             code = IG%)                                         

 

             LYMPHOCYTE % (test code = LY%) 14.9 %       20.5-45.5    L         

   

 

             MONOCYTE % (test code = MO%) 4.9 %        5.5-11.7     L           

 

 

             EOSINOPHIL % (test code = EO%) 0.9 %        0.9-2.9      N         

   

 

             BASOPHIL % (test code = BA%) 0.2 %        0.2-1.0      N           

 

 

             NUCLEATED RBC % (test code = 0.0 %        0-1.0        N           

 



             NRBC%)                                              

 

             NEUTROPHIL # (test code = NT#) 8.13 x10 3/uL 2.2-4.8      H        

    

 

             IMMATURE GRANULOCYTE # (test 0.02 x10 3/uL 0-0.03       N          

  



             code = IG#)                                         

 

             LYMPHOCYTE # (test code = LY#) 1.54 x10 3/uL 1.3-2.9      N        

    

 

             MONOCYTE # (test code = MO#) 0.51 x10 3/uL 0.3-0.8      N          

  

 

             EOSINOPHIL # (test code = EO#) 0.09 x10 3/uL 0.0-0.2      N        

    

 

             BASOPHIL # (test code = BA#) 0.02 x10 3/uL 0.0-0.1      N

## 2022-09-28 NOTE — RAD REPORT
EXAM DESCRIPTION:  RAD - Humerus Left - 9/28/2022 3:23 pm

 

CLINICAL HISTORY:   Left arm pain status post fall

 

FINDINGS:  Comminuted markedly displaced fracture humeral head.

 

No dislocation seen

## 2022-09-28 NOTE — EDPHYS
Physician Documentation                                                                           

 [facility]                                                                 

Name: Winter Kaur                                                                                   

Age: 86 yrs                                                                                       

Sex: Female                                                                                       

: 1935                                                                                   

MRN: [id number]                                                                                   

Arrival Date: 2022                                                                          

Time: 14:56                                                                                       

Account#: [id number]                                                                            

Bed 18                                                                                            

Private MD:                                                                                       

ED Physician Albert Awan                                                                         

HPI:                                                                                              

                                                                                             

16:07 This 86 yrs old Female presents to ER via EMS with complaints of fall, shoulder injury. rn  

16:07 The patient or guardian complains of an injury, pain. left shoulder. Onset: The         rn  

      symptoms/episode began/occurred just prior to arrival. Modifying factors: the symptoms      

      are alleviated by nothing. The symptoms are aggravated by movement, rotation of arm.        

      Associated signs and symptoms: Pertinent positives: severe pain, Pertinent negatives:       

      abdominal pain, chest pain. Severity of symptoms: At their worst the symptoms were          

      moderate, in the emergency department the symptoms are unchanged. The patient has not       

      experienced similar symptoms in the past. The patient has not recently seen a               

      physician. EMS reports patient was dancing, fell from standing, isolated injury to left     

      shoulder. Has had shoulder dislocation in past. Denies head injury/neck pain/back           

      pain/chest pain/abd pain..                                                                  

                                                                                                  

Historical:                                                                                       

- Allergies:                                                                                      

15:08 No Known Allergies;                                                                     bp  

- PMHx:                                                                                           

15:08 Hypothyroidism; Hypertensive disorder; Chronic obstructive lung disease;                bp  

      Gastroesophageal reflux disease; Dementia; Hypercholesterolemia;                            

                                                                                                  

- Immunization history:: Adult Immunizations up to date.                                          

- Social history:: Smoking status: Patient denies any tobacco usage or history of.                

- Family history:: not pertinent.                                                                 

- Hospitalizations: : No recent hospitalization is reported.                                      

                                                                                                  

                                                                                                  

ROS:                                                                                              

16:07 Constitutional: Negative for fever, chills, and weight loss, Eyes: Negative for injury, rn  

      pain, redness, and discharge, Neck: Negative for injury, pain, and swelling,                

      Cardiovascular: Negative for chest pain, palpitations, and edema, Respiratory: Negative     

      for shortness of breath, cough, wheezing, and pleuritic chest pain, Abdomen/GI:             

      Negative for abdominal pain, nausea, vomiting, diarrhea, and constipation, Back:            

      Negative for injury and pain, MS/Extremity: + left shoulder/arm injury and pain Skin:       

      Negative for injury, rash, and discoloration, Neuro: Negative for headache, weakness,       

      numbness, tingling, and seizure.                                                            

                                                                                                  

Exam:                                                                                             

16:07 Constitutional:  This is a well developed, well nourished patient who is awake, alert,  rn  

      appears in pain Head/Face:  Normocephalic, atraumatic. Neck:  No neck tenderness            

      Chest/axilla:  Normal chest wall appearance and motion.  Nontender with no deformity.       

      No lesions are appreciated. Cardiovascular:  Regular rate and rhythm.  No pulse             

      deficits. Respiratory:  No increased work of breathing, no retractions or nasal             

      flaring. Abdomen/GI:  soft, non-tender Back:  No spinal tenderness.  No costovertebral      

      tenderness.  Full range of motion. Skin:  Warm, dry  MS/ Extremity:  Pulses equal, no       

      cyanosis.   Neuro:  Awake and alert, GCS 15                                                 

                                                                                                  

Vital Signs:                                                                                      

15:06  / 67; Pulse 84; Resp 16; Temp 98; Pulse Ox 96% ;                                 bp  

17:04  / 52; Pulse 64; Resp 16; Pulse Ox 100% ;                                         bp  

18:20  / 62; Pulse 62; Resp 16; Pulse Ox 99% ;                                          bp  

                                                                                                  

MDM:                                                                                              

14:56 Patient medically screened.                                                             rn  

16:11 Differential diagnosis: Anterior dislocation with fracture, Anterior dislocation        rn  

      without fracture, humeral head fracture, glenoid fracture. Data reviewed: vital signs,      

      nurses notes, radiologic studies, plain films, and as a result, I will admit patient.       

      Counseling: I had a detailed discussion with the patient and/or guardian regarding: the     

      historical points, exam findings, and any diagnostic results supporting the                 

      discharge/admit diagnosis, lab results, radiology results, the need to transfer to          

      another facility, for higher level of care, [facility] does not           

      immediately have the required specialist. Response to treatment: the patient's symptoms     

      have mildly improved after treatment, and as a result, I will admit patient.                

                                                                                                  

                                                                                             

15:23 Order name: CBC with Diff; Complete Time: 16:00                                         rn  

                                                                                             

15:23 Order name: Basic Metabolic Panel; Complete Time: 16:00                                 rn  

                                                                                             

15:23 Order name: Protime (+inr); Complete Time: 16:00                                        rn  

                                                                                             

15:23 Order name: Ptt, Activated; Complete Time: 16:00                                        rn  

09/28                                                                                             

16:36 Order name: SARS RAPID                                                                  iw  

                                                                                             

14:57 Order name: IV Start; Complete Time: 15:                                              rn  

                                                                                             

14:57 Order name: NPO; Complete Time: 15:01                                                   rn  

                                                                                             

14:57 Order name: XRAY Humerus LEFT; Complete Time: 16:00                                     rn  

                                                                                             

15:23 Order name: Sling; Complete Time: 17:33                                                 rn  

                                                                                                  

Administered Medications:                                                                         

15:35 Drug: morphine 2 mg Route: IVP; Infused Over: 4 mins; Site: right antecubital;          bp  

18:26 Follow up: Response: Pain is decreased                                                  bp  

15:35 Drug: Zofran (Ondansetron) 4 mg Route: IVP; Site: right antecubital;                    bp  

18:26 Follow up: Response: No adverse reaction                                                bp  

17:15 Drug: Demerol (meperidine) 12.5 mg Route: IVP; Site: right forearm;                     bp  

18:26 Follow up: Response: No adverse reaction; Pain is decreased                             bp  

                                                                                                  

                                                                                                  

Disposition Summary:                                                                              

22 16:12                                                                                    

Transfer Ordered                                                                                  

      Transfer Location: [facility]                                              rn  

      Reason: Higher level of care                                                            rn  

      Condition: Stable                                                                       rn  

      Problem: new                                                                            rn  

      Symptoms: have improved                                                                 rn  

      Accepting Physician: (22 18:43)                                                bp  

      Diagnosis                                                                                   

        - Displaced comminuted fracture of shaft of humerus, left arm, initial encounter for  rn  

      closed fracture - Intraarticular                                                            

      Forms:                                                                                      

        - Medication Reconciliation Form                                                      rn  

        - SBAR form                                                                           rn  

Signatures:                                                                                       

Dispatcher [employer]                           EDMS                                                 

Albert Awan MD MD rn Peltier, Brian, RN                      RN   bp                                                   

                                                                                                  

Corrections: (The following items were deleted from the chart)                                    

16:44 16:15 SARS-COV-2 RT PCR+MOL.LAB.BRZ ordered. EDMS                                       EDMS

18:43 16:12  rn                                                                            bp  

                                                                                                  

**************************************************************************************************

## 2022-09-28 NOTE — ER
Nurse's Notes                                                                                     

 [facility]                                                                 

Name: Winter Kaur                                                                                   

Age: 86 yrs                                                                                       

Sex: Female                                                                                       

: 1935                                                                                   

MRN: [id number]                                                                                   

Arrival Date: 2022                                                                          

Time: 14:56                                                                                       

Account#: [id number]                                                                            

Bed 18                                                                                            

Private MD:                                                                                       

Diagnosis: Displaced comminuted fracture of shaft of humerus, left arm, initial encounter for     

  closed fracture-Intraarticular                                                                  

                                                                                                  

Presentation:                                                                                     

                                                                                             

15:06 Chief complaint: EMS states: FALL AT [facility] WITH LEFT SHOULDER        bp  

      DEFORMITY. Coronavirus screen: At this time, the client does not indicate any symptoms      

      associated with coronavirus-19. Ebola Screen: No symptoms or risks identified at this       

      time. Initial Sepsis Screen: Does the patient meet any 2 criteria? No. Patient's            

      initial sepsis screen is negative. Does the patient have a suspected source of              

      infection? No. Patient's initial sepsis screen is negative. Risk Assessment: Do you         

      want to hurt yourself or someone else? Patient reports no desire to harm self or            

      others. Onset of symptoms was 2022 at 14:30. Care prior to arrival:           

      Medication(s) given: FENTANYL 50 MCG IV initiated. 22 GA, in the right antecubital area.    

15:06 Method Of Arrival: EMS: [facility]                                                bp  

15:06 Acuity: ADRIANE 3                                                                           bp  

                                                                                                  

Triage Assessment:                                                                                

15:08 General: Appears in no apparent distress. uncomfortable, Behavior is calm, cooperative. bp  

      Pain: Complains of pain in anterior aspect of left shoulder. EENT: No deficits noted.       

      Neuro: No deficits noted. Cardiovascular: No deficits noted. Respiratory: No deficits       

      noted. GI: No signs and/or symptoms were reported involving the gastrointestinal            

      system. : No signs and/or symptoms were reported regarding the genitourinary system.      

      Derm: No deficits noted. Musculoskeletal: No deficits noted.                                

                                                                                                  

Historical:                                                                                       

- Allergies:                                                                                      

15:08 No Known Allergies;                                                                     bp  

- PMHx:                                                                                           

15:08 Hypothyroidism; Hypertensive disorder; Chronic obstructive lung disease;                bp  

      Gastroesophageal reflux disease; Dementia; Hypercholesterolemia;                            

                                                                                                  

- Immunization history:: Adult Immunizations up to date.                                          

- Social history:: Smoking status: Patient denies any tobacco usage or history of.                

- Family history:: not pertinent.                                                                 

- Hospitalizations: : No recent hospitalization is reported.                                      

                                                                                                  

                                                                                                  

Screening:                                                                                        

15:10 Abuse screen: Denies threats or abuse. Denies injuries from another. Nutritional        bp  

      screening: No deficits noted. Tuberculosis screening: No symptoms or risk factors           

      identified. Fall Risk None identified.                                                      

                                                                                                  

Assessment:                                                                                       

15:10 General: SEE TRIAGE NOTE.                                                               bp  

16:59 Reassessment: Dr. Awan updated son Mark on POC via telephone, cell ph #             iw  

      [phone].                                                                               

17:04 Reassessment: REPORT TO TARSHA NOVA AT [facility].                                            bp  

18:21 Reassessment: No changes from previously documented assessment. TRANSPORT PENDING.      bp  

18:24 Reassessment: EMS AT B/S, PT RONI WITH TRANSPORT.                                        bp  

                                                                                                  

Vital Signs:                                                                                      

15:06  / 67; Pulse 84; Resp 16; Temp 98; Pulse Ox 96% ;                                 bp  

17:04  / 52; Pulse 64; Resp 16; Pulse Ox 100% ;                                         bp  

18:20  / 62; Pulse 62; Resp 16; Pulse Ox 99% ;                                          bp  

                                                                                                  

ED Course:                                                                                        

14:56 Patient arrived in ED.                                                                  rn  

14:56 Albert Awan MD is Attending Physician.                                                rn  

15:01 Brian Zambrano RN is Primary Nurse.                                                    bp  

15:08 Triage completed.                                                                       bp  

15:08 Arm band placed on.                                                                     bp  

15:10 Patient has correct armband on for positive identification. Bed in low position. Call   bp  

      light in reach. Side rails up X2.                                                           

15:10 Maintain EMS IV. Dressing intact. Good blood return noted. Site clean \T\ dry. Gauge \T\    bp

      site: 22 G RIGHT AC.                                                                        

15:25 XRAY Humerus LEFT In Process Unspecified.                                               EDMS

16:36 initiated transfer to [facility].                                                      bd  

16:42 pt accepted in transfer to [facility] er by dr Braswell admin approval given by Renée Hoang.                                                                                     

18:25 No provider procedures requiring assistance completed. Patient transferred, IV remains  bp  

      in place.                                                                                   

                                                                                                  

Administered Medications:                                                                         

15:35 Drug: morphine 2 mg Route: IVP; Infused Over: 4 mins; Site: right antecubital;          bp  

18:26 Follow up: Response: Pain is decreased                                                  bp  

15:35 Drug: Zofran (Ondansetron) 4 mg Route: IVP; Site: right antecubital;                    bp  

18:26 Follow up: Response: No adverse reaction                                                bp  

17:15 Drug: Demerol (meperidine) 12.5 mg Route: IVP; Site: right forearm;                     bp  

18:26 Follow up: Response: No adverse reaction; Pain is decreased                             bp  

                                                                                                  

                                                                                                  

Medication:                                                                                       

15:10 VIS not applicable for this client.                                                     bp  

                                                                                                  

Outcome:                                                                                          

16:12 ER care complete, transfer ordered by MD.                                               rn  

18:25 Transferred by ground EMS to [facility], Transfer form completed.             bp  

18:25 Condition: stable                                                                           

18:25 Instructed on the need for transfer.                                                        

18:43 Patient left the ED.                                                                    bp  

                                                                                                  

Signatures:                                                                                       

Dispatcher [employer]                           EDMS                                                 

Gina Shore Irene, RN RN iw Nieto, Roman, MD MD rn Peltier, Brian, AVTAR                      RN   bp                                                   

                                                                                                  

************************************************************************************************** n/a

## 2022-09-30 VITALS — OXYGEN SATURATION: 99 % | SYSTOLIC BLOOD PRESSURE: 103 MMHG | DIASTOLIC BLOOD PRESSURE: 62 MMHG

## 2022-09-30 VITALS — TEMPERATURE: 98 F

## 2023-03-19 ENCOUNTER — HOSPITAL ENCOUNTER (EMERGENCY)
Dept: HOSPITAL 97 - ER | Age: 88
Discharge: HOME | End: 2023-03-19
Payer: COMMERCIAL

## 2023-03-19 VITALS — DIASTOLIC BLOOD PRESSURE: 70 MMHG | SYSTOLIC BLOOD PRESSURE: 132 MMHG | OXYGEN SATURATION: 97 %

## 2023-03-19 VITALS — TEMPERATURE: 98.5 F

## 2023-03-19 DIAGNOSIS — R06.02: ICD-10-CM

## 2023-03-19 DIAGNOSIS — J44.9: ICD-10-CM

## 2023-03-19 DIAGNOSIS — K21.9: ICD-10-CM

## 2023-03-19 DIAGNOSIS — E03.9: ICD-10-CM

## 2023-03-19 DIAGNOSIS — U07.1: Primary | ICD-10-CM

## 2023-03-19 DIAGNOSIS — F03.90: ICD-10-CM

## 2023-03-19 DIAGNOSIS — S52.502A: ICD-10-CM

## 2023-03-19 DIAGNOSIS — I10: ICD-10-CM

## 2023-03-19 DIAGNOSIS — E78.00: ICD-10-CM

## 2023-03-19 LAB
BUN BLD-MCNC: 26 MG/DL (ref 7–18)
GLUCOSE SERPLBLD-MCNC: 100 MG/DL (ref 74–106)
HCT VFR BLD CALC: 30.8 % (ref 36–45)
LYMPHOCYTES # SPEC AUTO: 1 K/UL (ref 0.7–4.9)
MCV RBC: 93.7 FL (ref 80–100)
MORPHOLOGY BLD-IMP: (no result)
NT-PROBNP SERPL-MCNC: 527 PG/ML (ref ?–450)
PMV BLD: 6.4 FL (ref 7.6–11.3)
POTASSIUM SERPL-SCNC: 4.1 MEQ/L (ref 3.5–5.1)
RBC # BLD: 3.29 M/UL (ref 3.86–4.86)
TROPONIN I SERPL HS-MCNC: 9.8 PG/ML (ref ?–58.9)

## 2023-03-19 PROCEDURE — 93005 ELECTROCARDIOGRAM TRACING: CPT

## 2023-03-19 PROCEDURE — 83880 ASSAY OF NATRIURETIC PEPTIDE: CPT

## 2023-03-19 PROCEDURE — 84484 ASSAY OF TROPONIN QUANT: CPT

## 2023-03-19 PROCEDURE — 80048 BASIC METABOLIC PNL TOTAL CA: CPT

## 2023-03-19 PROCEDURE — 85025 COMPLETE CBC W/AUTO DIFF WBC: CPT

## 2023-03-19 PROCEDURE — 36415 COLL VENOUS BLD VENIPUNCTURE: CPT

## 2023-03-19 PROCEDURE — 99284 EMERGENCY DEPT VISIT MOD MDM: CPT

## 2023-03-19 PROCEDURE — 71045 X-RAY EXAM CHEST 1 VIEW: CPT

## 2023-03-19 NOTE — EDPHYS
Physician Documentation                                                                           

 [facility]                                                                 

Name: iWnter Kaur                                                                                   

Age: 87 yrs                                                                                       

Sex: Female                                                                                       

: 1935                                                                                   

MRN: [id number]                                                                                   

Arrival Date: 2023                                                                          

Time: 04:18                                                                                       

Account#: [id number]                                                                            

Bed 2                                                                                             

Private MD:                                                                                       

ED Physician Kenny Stock                                                                       

HPI:                                                                                              

                                                                                             

05:14 This 87 yrs old Female presents to ER via EMS with complaints of COVID positive.        ms3 

05:14 87-year-old female with past medical history of COPD, dementia, GERD presents via [address] [facility] after they were called for shortness of breath. On their arrival patient's      

      oxygen saturation 97% on room air. Nursing staff noted to EMS patient is                    

      COVID-positive. EMS noted patient's left wrist to be swollen and tender to touch..          

                                                                                                  

Historical:                                                                                       

- Allergies:                                                                                      

04:27 No Known Allergies;                                                                     pf1 

- PMHx:                                                                                           

04:27 Chronic obstructive lung disease; Dementia; Gastroesophageal reflux disease;            pf1 

      Hypercholesterolemia; Hypertensive disorder; Hypothyroidism;                                

                                                                                                  

- Immunization history:: Adult Immunizations unknown, unknown Last tetanus                        

  immunization: unknown.                                                                          

- Social history:: Smoking status: unknown.                                                       

                                                                                                  

                                                                                                  

ROS:                                                                                              

05:14 Unable to obtain ROS due to baseline dementia.                                          ms3 

                                                                                                  

Exam:                                                                                             

05:14 Constitutional:  This is a well developed, well nourished patient who is awake, alert,  ms3 

      and in no acute distress. Head/Face:  Normocephalic, atraumatic. Neck:  Trachea             

      midline, no cervical lymphadenopathy.  Supple, full range of motion without nuchal          

      rigidity, or vertebral point tenderness.  No Meningismus. Chest/axilla:  Normal chest       

      wall appearance and motion.  Nontender with no deformity.   Cardiovascular:  Regular        

      rate and rhythm with a normal S1 and S2.  No gallops, murmurs, or rubs.  Normal PMI, no     

      JVD.  No pulse deficits. Respiratory:  Lungs have equal breath sounds bilaterally,          

      clear to auscultation and percussion.  No rales, rhonchi or wheezes noted.  No              

      increased work of breathing, no retractions or nasal flaring. Abdomen/GI:  Soft,            

      non-tender, with normal bowel sounds.  No distension or tympany.  No guarding or            

      rebound.  No evidence of tenderness throughout.                                             

05:14 Musculoskeletal/extremity: Extremities: noted in the Left wrist: contusion, pain,           

      swelling, tenderness.                                                                       

05:14 Skin:                                                                                       

                                                                                                  

Vital Signs:                                                                                      

04:19  / 50; Pulse 90; Resp 20; Temp 100.4; Pulse Ox 97% on R/A; Weight 63.5 kg;        pf1 

05:15  / 58; Pulse 84; Resp 6; Pulse Ox 97% on R/A;                                     pf1 

05:15  / 53; Pulse 77; Resp 16; Pulse Ox 97% on R/A;                                    pf1 

06:30  / 62; Pulse 71; Resp 16; Pulse Ox 96% on R/A;                                    pf1 

07:14  / 50; Pulse 71; Resp 23; Pulse Ox 96% on R/A;                                    hb  

08:17  / 59; Pulse 96; Resp 18; Pulse Ox 95% on R/A;                                    hb  

08:48 Temp 98.5(O);                                                                           ss  

09:35  / 49; Pulse 66; Resp 18; Pulse Ox 96% on R/A; Pain 0/10;                         ss  

10:19  / 70; Pulse 73; Resp 20; Pulse Ox 97% ;                                          hb  

09:35 Pain Scale: Adult                                                                       ss  

                                                                                                  

MDM:                                                                                              

04:19 Patient medically screened.                                                             ms3 

05:14 Differential Diagnosis Wrist fracture versus COVID versus pneumonia.                    ms3 

06:16 Independent interpretation of the following test(s) in the Emergency Department X-Ray:  ms3 

      My interpretation is Left wrist x-ray images reviewed by me: Possible radius fracture..     

07:10 Transition of care: After a detail discussion of the patient's case, care is            ms3 

      transferred to Kenny Stock MD.                                                            

                                                                                                  

                                                                                             

04:19 Order name: CXR XRAY                                                                    ms3 

                                                                                             

04:19 Order name: Wrist Left (3 View) XRAY                                                    ms3 

                                                                                             

08:48 Order name: EKG; Complete Time: 08:49                                                   kdr 

                                                                                             

08:39 Order name: Sugar Tong Forearm Splint; Complete Time: 08:57                             kdr 

                                                                                             

08:48 Order name: O2 Sat Monitoring; Complete Time: 08:57                                     kdr 

                                                                                             

08:48 Order name: O2 Per Protocol; Complete Time: 08:57                                       kdr 

                                                                                             

08:48 Order name: IV Saline Lock; Complete Time: 08:57                                        kdr 

                                                                                             

08:48 Order name: Cardiac monitoring; Complete Time: 08:57                                    kdr 

                                                                                             

08:48 Order name: Labs collected and sent; Complete Time: 09:08                               kdr 

                                                                                             

08:48 Order name: EKG - Nurse/Tech; Complete Time: 09:                                      kdr 

                                                                                             

08:48 Order name: Basic Metabolic Panel; Complete Time: 10:                                 kdr 

                                                                                             

08:48 Order name: CBC with Diff; Complete Time: 10:                                         kdr 

                                                                                             

08:48 Order name: NT PRO-BNP; Complete Time: 10:                                            kdr 

                                                                                             

08:48 Order name: Troponin HS; Complete Time: 10:                                           kdr 

                                                                                                  

Administered Medications:                                                                         

05:42 Drug: Acetaminophen  mg Route: PO;                                                pf1 

06:16 Follow up: Response: No adverse reaction; Marked relief of symptoms; Pain is decreased  pf1 

                                                                                                  

                                                                                                  

Disposition Summary:                                                                              

23 10:21                                                                                    

Discharge Ordered                                                                                 

      Location: Home                                                                          kdr 

      Problem: new                                                                            kdr 

      Symptoms: have improved                                                                 kdr 

      Condition: Stable                                                                       kdr 

      Diagnosis                                                                                   

        - SARS-associated coronavirus as the cause of diseases classified elsewhere           kdr 

        - Shortness of breath                                                                 kdr 

        - Nondisplaced left distal radius fracture                                            kdr 

      Followup:                                                                               kdr 

        - With: Private Physician                                                                  

        - When: 2 - 3 days                                                                         

        - Reason: If symptoms return, Further diagnostic work-up, Recheck today's complaints,      

      Continuance of care, Re-evaluation by your physician                                        

      Discharge Instructions:                                                                     

        - Discharge Summary Sheet                                                             pf1 

      Forms:                                                                                      

        - SBAR form                                                                           pf1 

        - Medication Reconciliation Form                                                      kdr 

        - Thank You Letter                                                                    kdr 

        - Antibiotic Education                                                                kdr 

        - Prescription Opioid Use                                                             kdr 

Signatures:                                                                                       

Dispatcher [employer]                           Kenny Flores MD MD   kdr                                                  

Alberto Johns DO                        DO   ms3                                                  

Little burkett RN                      RN   pf1                                                  

                                                                                                  

**************************************************************************************************

## 2023-03-19 NOTE — XMS REPORT
Continuity of Care Document

                            Created on:2023



Patient:LY FRANCIS

Sex:Female

:1935

External Reference #:[id number]





Demographics







                          Address                   [address] [place] [address]

 

                          Home Phone                [phone] SON

 

                          Work Phone                [phone]

 

                          Email Address             [email or web address] ([email or web address])

 

                          Preferred Language        English

 

                          Marital Status            Unknown

 

                          Sabianism Affiliation     Unknown

 

                          Race                      Unknown

 

                          Additional Race(s)        Unavailable



                                                    Unavailable



                                                    White

 

                          Ethnic Group              Unknown









Author







                          Organization              [facility]

t

 

                          Address                   [address]



                                                    [place] [address]

 

                          Phone                     [phone]









Support







                Name            Relationship    Address         Phone

 

                MORGAN FRANCIS ()               UNKNOWN         [phone]



                                                [place] [address] 

 

                BOBBI FRANCIS     OR              Unavailable     [phone]

 

                MORGAN FRANCIS                 [address] [phone]



                                                [place] [address] 

 

                MORGAN FRANCIS                 UNK             [phone]



                                                [place] [address] 

 

                PENNY FRANCIS                    [address] [phone]



                                                [place] [address] 

 

                MORGAN FRANCIS   Unavailable     [address] [phone]



                                                [place] [address] 









Care Team Providers







                    Name                Role                Phone

 

                    DR ROSA LONGORIA Primary Care Physician Unavailable

 

                    Roxane Julian      Attending Clinician Unavailable

 

                    MARCUS CHAUHAN Attending Clinician Unavailable

 

                    SARI PINTO         Attending Clinician Unavailable

 

                    EDWIN VELEZ      Attending Clinician Unavailable

 

                    STACY CORBETT     Attending Clinician Unavailable

 

                    Sandoval Landin     Attending Clinician Unavailable

 

                    Arnulfo Gant  Attending Clinician Unavailable

 

                    DIOGO VANEGAS           Attending Clinician Unavailable

 

                    Octavio Gilliam    Attending Clinician Unavailable

 

                    Roxane Julian      Admitting Clinician Unavailable

 

                    TAMI FARRAR        Admitting Clinician Unavailable

 

                    ELAINE STALLWORTH       Admitting Clinician Unavailable

 

                    Physician, No Primary or Family Admitting Clinician UnavailDIOGO Haynes           Admitting Clinician Unavailable

 

                    Alberto Salomon PA-C Admitting Clinician Unavailable









Payers







           Payer Name Policy Type Policy Number Effective Date Expiration Date S

brenda

 

           [id number]                [id number]  1960 00:00:00            







Problems







       Condition Condition Condition Status Onset  Resolution Last   Treating Co

mments 

Source



       Name   Details Category        Date   Date   Treatment Clinician        



                                                 Date                 

 

       Acute  Acute  Diagnosis Active 2021                             [facility]



       Confusion Confusion               0-25                               [facility]

s



                                   00:00:                             [facility]



                                   00                                 l



                                                                      (LUF/LI



                                                                      V/SA)







Allergies, Adverse Reactions, Alerts







       Allergy Allergy Status Severity Reaction(s) Onset  Inactive Treating Comm

ents 

Source



       Name   Type                        Date   Date   Clinician        

 

       No Known DA     Active U             2021                      [facility]



       Allergie                             2-13                        [facility]



       s                                  00:00:                      d



                                          00                          Medical



                                                                      Center

 

       No Known DA     Active U             2020                      [facility]



       Allergie                             0-03                        [facility]



       s                                  00:00:                      d



                                          00                          Medical



                                                                      Center

 

       No Known DA     Active U             2020                      [facility]



       Allergie                             0-03                        [facility]



       s                                  00:00:                      d



                                          00                          Medical



                                                                      Center

 

       Sulfa  DA     Active MI            2010                      [facility]



       (Sulfona                             0-19                        [facility]



       mide                               00:00:                      d



       Antibiot                             00                          Medical



       ics)                                                           Center

 

       Sulfa  DA     Active MI            2010                      [facility]



       (Sulfona                             0-19                        [facility]



       mide                               00:00:                      d



       Antibiot                             00                          Medical



       ics)                                                           Center

 

       CELEBREX DA     Active U             -0                      [facility]



                                          5-21                        [facility]



                                          00:00:                      d



                                          00                          Medical



                                                                      Center

 

       IVP DYE DA     Active U             -0                      [facility]



                                          5-21                        [facility]



                                          00:00:                      d



                                          00                          Medical



                                                                      Center

 

       No Known DA     Active U             -0                      [facility]



       Food                               5-21                        [facility]



       Allergie                             00:00:                      d



       s                                  00                          Medical



                                                                      Center

 

       No Known DA     Active U             -0                      [facility]



       Other                              5-21                        [facility]



       Allergie                             00:00:                      d



       s                                  00                          Medical



                                                                      Center

 

       VIOXX  DA     Active U             -0                      [facility]



                                          5-21                        [facility]



                                          00:00:                      d



                                          00                          Medical



                                                                      Center







Social History







           Social Habit Start Date Stop Date  Quantity   Comments   Source

 

           Sex Assigned At 1935                       [facility]



           Birth      00:00:00   00:00:00                         









                Smoking Status  Start Date      Stop Date       Source

 

                Tobacco smoking consumption                                 [facility]

[facility]



                unknown                                         

 

                Former smoker                                   [facility]

orial



                                                                (LUF/GENOVEVA/SA)







Medications

This patient has no known medications.



Immunizations







           Ordered Immunization Filled Immunization Date       Status     Commen

ts   Source



           Name       Name                                        

 

           PFIZER COVID-19 MRNA            2021 Completed             [facility]

[facility]



           VACCINATION            00:00:00                         [facility]

 

           PFIZER COVID-19 MRNA            2021 Completed             [facility]

[facility]



           VACCINATION            00:00:00                         [facility]

 

           PFIZER COVID-19 MRNA            2021 Completed             [facility]

[facility]



           VACCINATION            00:00:00                         [facility]

 

           PFIZER COVID-19 MRNA            2021 Completed             [facility]

[facility]



           VACCINATION            00:00:00                         [facility]

 

           PFIZER COVID-19 MRNA            2021 Completed             [facility]

[facility]



           VACCINATION            00:00:00                         [facility]

 

           PFIZER COVID-19 MRNA            2021 Completed             [facility]

[facility]



           VACCINATION            00:00:00                         Hospital







Vital Signs







             Vital Name   Observation Time Observation Value Comments     Source

 

             Height       2021-10-25 17:38:00 175.26 CM                 

 

             Weight       2021-10-25 17:38:00 61.23 KG                  

 

             BP Systolic  2021-10-25 17:38:00 161 mm[Hg]                [facility]



                                                                 (LUF/GENOVEVA/SA)

 

             BP Diastolic 2021-10-25 17:38:00 91 mm[Hg]                 [facility]



                                                                 (LUF/GENOVEVA/SA)

 

             Height       2021-10-25 17:38:00 69 [in_i]                 [facility]



                                                                 (LUF/GENOVEVA/SA)

 

             Weight       2021-10-25 17:38:00 135 [lb_av]               [facility]



                                                                 (LUF/GENOVEVA/SA)

 

             BMI (Body Mass Index) 2021-10-25 17:38:00 19.9 kg/m2               

 [facility]



                                                                 (LUF/GENOVEVA/SA)

 

             Body Temperature 2021-10-25 17:38:00 98.2 [degF]               [facility]



                                                                 (LUF/GENOVEVA/SA)

 

             Pulse Rate   2021-10-25 17:38:00 97 /min                   [facility]



                                                                 (LUF/GENOVEVA/SA)

 

             Respiratory Rate 2021-10-25 17:38:00 20 /min                   [facility]



                                                                 (LUF/GENOVEVA/SA)

 

             O2% BldC Oximetry 2021-10-25 17:38:00 95 %                      [facility]



                                                                 (LUF/GENOVEVA/SA)







Procedures







                Procedure       Date / Time Performed Performing Clinician [facility]

ivon

 

                [id number]         2020-10-04 00:00:00 EVERT           [facility]







Plan of Care







             Planned Activity Planned Date Details      Comments     Source

 

             Future Scheduled 2022   SHINGLES VACCINES (1              Met

[facility]



             Test         17:02:35     of 2) [code = SHINGLES              



                                       VACCINES (1 of 2)]              

 

             Future Scheduled 2022   65+ PNEUMOCOCCAL              Methodi

[facility]



             Test         17:02:35     VACCINE (1 - PCV)              



                                       [code = 65+               



                                       PNEUMOCOCCAL VACCINE              



                                       (1 - PCV)]                

 

             Future Scheduled 2022   COVID-19 VACCINE (3 -              Me

[facility]



             Test         17:02:35     Booster for Pfizer              



                                       series) [code =              



                                       COVID-19 VACCINE (3 -              



                                       Booster for Pfizer              



                                       series)]                  

 

             Future Scheduled 2022   INFLUENZA VACCINE              Method

[facility]



             Test         17:02:35     [code = INFLUENZA              



                                       VACCINE]                  

 

             Future Scheduled 2022   COVID-19 VACCINE (3 -              Me

[facility]



             Test         02:31:10     Booster for Pfizer              



                                       series) [code =              



                                       COVID-19 VACCINE (3 -              



                                       Booster for Pfizer              



                                       series)]                  

 

             Future Scheduled 2022   INFLUENZA VACCINE              Method

[facility]



             Test         02:31:10     [code = INFLUENZA              



                                       VACCINE]                  

 

             Future Scheduled 2022   HEPATITIS B VACCINES              Met

[facility]



             Test         02:31:10     (1 of 3 - 3-dose              



                                       series) [code =              



                                       HEPATITIS B VACCINES              



                                       (1 of 3 - 3-dose              



                                       series)]                  

 

             Future Scheduled 2022   SHINGLES VACCINES (1              Met

[facility]



             Test         02:31:10     of 2) [code = SHINGLES              



                                       VACCINES (1 of 2)]              

 

             Future Scheduled 2022   65+ [facility]NE[facility]OCOCCAL              [facility]



             Test         02:31:10     VACCINE (1 - PCV)              



                                       [code = 65+               



                                       PNEUMOCOCCAL VACCINE              



                                       (1 - PCV)]                

 

             Future Scheduled 2022   COVID-19 VACCINE (3 -              Me

[facility]



             Test         02:31:10     Booster for Pfizer              



                                       series) [code =              



                                       COVID-19 VACCINE (3 -              



                                       Booster for Pfizer              



                                       series)]                  

 

             Future Scheduled 2022   INFLUENZA VACCINE              Method

[facility]



             Test         02:31:10     [code = INFLUENZA              



                                       VACCINE]                  

 

             Future Scheduled 2022   HEPATITIS B VACCINES              Met

[facility]



             Test         02:31:10     (1 of 3 - 3-dose              



                                       series) [code =              



                                       HEPATITIS B VACCINES              



                                       (1 of 3 - 3-dose              



                                       series)]                  

 

             Future Scheduled 2022   SHINGLES VACCINES (1              Met

[facility]



             Test         02:31:10     of 2) [code = SHINGLES              



                                       VACCINES (1 of 2)]              

 

             Future Scheduled 2022   65+ PNEUMOCOCCAL              [facility]



             Test         02:31:10     VACCINE (1 - PCV)              



                                       [code = 65+               



                                       PNEUMOCOCCAL VACCINE              



                                       (1 - PCV)]                







Encounters







        Start   End     Encounter Admission Attending Care    Care    Encounter 

Source



        Date/Time Date/Time Type    Type    Clinicians Facility Department ID   

   

 

        2020-10-04         Inpatient EM      MURALI Julian     [id number] 

[facility]



        00:56:00                         Roxane                   66      [facility]

 

        2022 Emergency E       GISSEL,   [facility]    MED     [id number]    

[facility]



        16:56:00 21:18:00                 MARCUS                          

 

        2022 Inpatient E       SARI PINTO [facility]    MED     [id number]

    [facility]



        19:34:00 17:20:00                                                 

 

        2022 Emergency E       AGUSTIN, [facility]    [facility]    7501  

  [facility]



        15:04:00 17:27:00                 EDWIN                            

 

        2022 2022-03-10 Outpatient         ARIC, [facility]    [facility]    [id number]  

  [facility]



        14:10:00 23:59:00                 STACY                            

 

        2022 Outpatient         ARIC, [facility]    [facility]    9401  

  [facility]



        10:56:00 23:59:00                 STACY                            

 

        2021 Outpatient         ARIC, [facility]    [facility]    9400  

  [facility]



        09:28:00 18:00:00                 STACY                            

 

        2021-12-15 2021-12-15 Emergency EM      Urvashi, HCAKW   [facility]    [id number]

94 [facility]



        17:28:00 19:27:00                 [address]

 

        2021-12-15 2021-12-15 Emergency EM      Urvashi, HCAKW   [facility]    [id number]

94 [facility]



        17:28:00 19:27:00                 [address]

 

        2021 Emergency EM      Stalin, Arnulfo [facility]   CHLOE    [id number]

[id number] [facility]



        13:10:00 19:00:00                                         29      [facility]

 

        2021 Emergency EM      Stalin, Arnulfo HCAKW   CHLOE    [id number]

[id number] [facility]



        13:10:00 19:00:00                                         29      [facility]

 

        2021-10-25 2021-10-25 DISORIENTA 1       DIOGO VANEGAS [facility]     [id number]

[address] [facility]



        17:37:00 18:56:00 AJAY calvin



                                                                        (ARLENE/LAURA ANN/)

 

        2021-10-25 2021-10-25 Inpatient                 [facility]     [facility]     [id number]

-[address] [facility]



        00:00:00 00:00:00                         DON CORLEY [id number]-

a Marcos



                                                [address], [address]         [id number] Memor

ia



                                                [address]          [id number]  l



                                                BYPASS,                 (LUF/LI



                                                [place]                 V/SA)



                                                [place]                   



                                                [address]                   

 

        2021-10-25 2021-10-25 Inpatient                 [facility]     [facility]     [id number]

-[address] [facility]



        00:00:00 00:00:00                         DON CORLEY [id number]

8 Luk[place]



                                                N, [address]         [id number] Memor

ia



                                                [address]          [id number]  [id number]



                                                BYPASS,                 (LUF/LI



                                                [place]                 V/SA)



                                                [place], [place]                   



                                                [address]                   

 

        2021-10-25 2021-10-25 Inpatient                 [facility]     [id number]

-f [facility]



        00:00:00 00:00:00                         DON CORLEY [id number]

a [place]cos



                                                [address], [address]         [id number] Memor

ia



                                                [address]          [id number]  l



                                                BYPASS,                 (LUF/LI



                                                [place]                 V/SA)



                                                [place], [place]                   



                                                [address]                   

 

        2021 Outpatient                 [facility]     [id number]

[address]



        00:00:00 00:00:00                                         682     Method

i



                                                                        st

 

        2021 Outpatient                 [facility]     [id number]

[address]



        00:00:00 00:00:00                                         660     Method

i



                                                                        st

 

        2020 Outpatient MURALI Wooten   RADI    [id number]

[address] [facility]



        11:15:00 11:15:00                 Octavio Monsivais      [facility]







Results







           Test Description Test Time  Test Comments Results    Result     Sour

e



                                                       Comments   

 

           - XR WRIST 2 VIEWS 2021            **********************      

      



           RT         17:39:00              **********************            



                                            [facility] 1935            



                                            Sex:                  



                                            F*********************            



                                            **********************            



                                            ***************** FAX:            



                                            Lashell Boswell MD            



                                            [phone] [place]:            



                                             St:                



                                            REG-------------------            



                                            ----------------------            



                                            ----------------------            



                                            ---------------- Name:            



                                            LY FRANCIS :            



                                            1935 Age/S: 86/F            



                                            [address]:            



                                            [id number] Loc: [place]            



                                            [place] [address]            



                                            Phys: Lashell Boswell MD            



                                            Acct: [id number] Dis            



                                            Date: Status: REG ER            



                                            PHONE #: [phone]            



                                            Exam Date: 2021 FAX #:            



                                            [phone] Reason:            



                                            POST REDUCTION EXAMS:            



                                            CPT CODE: [id number] XR            



                                            WRIST 2 [place]WS [place] [address]            



                                             Location: A1 EXAM: -            



                                            XR WRIST 2 VIEWS RT            



                                            INDICATION: POST            



                                            REDUCTION COMPARISON:            



                                            Wrist radiograph dated            



                                            the same day            



                                            TECHNIQUE: 2 views of            



                                            the right wrist            



                                            FINDINGS: Slight            



                                            interval improvement            



                                            in alignment of an            



                                            angulated, comminuted,            



                                            and displaced fracture            



                                            of the distal radius            



                                            status post closed            



                                            reduction, with some            



                                            mild persistent            



                                            angulation and            



                                            displacement.            



                                            Unchanged ulnar            



                                            styloid process            



                                            fracture. No            



                                            distinctly new acute            



                                            fracture. There is            



                                            soft tissue swelling            



                                            of the wrist.            



                                            IMPRESSION: Slight            



                                            interval improvement            



                                            in alignment. **            



                                            Electronically Signed            



                                            by Federico Shirley MD            



                                            ** **  on 2021            



                                            at 1739 ** Reported            



                                            and signed by:            



                                            Federico Shirley MD CC:            



                                            Lashell Boswell MD            



                                            Technologist: Alexandru Walton            



                                            Date/Time/By:            



                                            2021 () :            



                                            By: [email or web address] PAGE 1            



                                            Signed Report FAX:            



                                            Lashell Boswell MD            



                                            [phone] [place]:            



                                             St:                



                                            REG-------------------            



                                            ----------------------            



                                            ----------------------            



                                            ---------------- Name:            



                                            LY FRANCIS :            



                                            1935 Age/S: 86/F            



                                            [address]:            



                                            [id number] Loc: [place]            



                                            [place] [address]            



                                            Phys: Lashell Boswell MD            



                                            Acct: [id number] Dis            



                                            Date: Status: REG ER            



                                            PHONE #: [phone]            



                                            Exam Date: 2021            



                                            1730 FAX #:            



                                            [phone] Reason:            



                                            POST REDUCTION  EXAMS:            



                                            CPT CODE: [id number] XR            



                                            WRIST 2 [place] [address]            



                                            <Continued&gt; Orig            



                                            Print D/T: S:            



                                            2021 (4598) PAGE            



                                            2 Signed Report            

 

           - XR WRIST 2 VIEWS 2021            **********************      

      



           RT         17:39:00              **********************            



                                            ****************[facility]            



                                            FLORName: LY FRANCIS : 1935            



                                            Sex:                  



                                            F*********************            



                                            **********************            



                                            ***************** FAX:            



                                            Lashell Boswell MD            



                                            [phone]  [place]:            



                                             St:                



                                            DEP-------------------            



                                            ----------------------            



                                            ----------------------            



                                            ---------------- Name:            



                                            LY FRANCIS :            



                                            1935 Age/S: 86/F            



                                            [address]:            



                                            [id number] Loc: [place]            



                                            [place] [address]            



                                            Phys: Lashell Boswell MD            



                                            Acct: [id number] Dis            



                                            Date: Status: [facility] ER            



                                            PHONE #: [phone]            



                                            Exam Date: 2021 FAX #:            



                                            [phone] Reason:            



                                            POST REDUCTION EXAMS:            



                                            CPT CODE: [id number] XR            



                                            WRIST 2 [place]WS [place] [address]            



                                             Location: A1 EXAM: -            



                                            XR WRIST 2 VIEWS RT            



                                            INDICATION: POST            



                                            REDUCTION COMPARISON:            



                                            Wrist radiograph dated            



                                            the same day            



                                            TECHNIQUE: 2 views of            



                                            the right wrist            



                                            FINDINGS: Slight            



                                            interval improvement            



                                            in alignment of an            



                                            angulated, comminuted,            



                                            and displaced fracture            



                                            of the distal radius            



                                            status post closed            



                                            reduction, with some            



                                            mild persistent            



                                            angulation and            



                                            displacement.            



                                            Unchanged ulnar            



                                            styloid process            



                                            fracture. No            



                                            distinctly new acute            



                                            fracture.  There is            



                                            soft tissue swelling            



                                            of the wrist.            



                                            IMPRESSION: Slight            



                                            interval improvement            



                                            in alignment. **            



                                            Electronically Signed            



                                            by Federico Shirley MD            



                                            **  ** on 2021            



                                            at 3998 ** Reported            



                                            and signed by:            



                                            Federico Shirley MD CC:            



                                            Lashell Boswell MD            



                                            Technologist: Alexandru Odonnellsclucina            



                                            Date/Time/By:            



                                            2021 (1639) :            



                                            By: [email or web address] PAGE 1            



                                            Signed Report FAX:            



                                            Lashell Boswell MD            



                                            [phone] [place]:            



                                             St:                



                                            DEP-------------------            



                                            ----------------------            



                                            ----------------------            



                                            ---------------- Name:            



                                            LY FRANCIS :            



                                            1935 Age/S: 86/F            



                                            [address]  [address]            



                                            #: [id number] Loc:            



                                            [place]            



                                            [address] Phys:            



                                            Lashell Boswell MD Acct:            



                                            [id number] Dis Date:            



                                             Status: [facility] ER PHONE            



                                            #: [phone] Exam            



                                            Date: 2021            



                                            FAX #: [phone]            



                                            Reason: POST REDUCTION            



                                             EXAMS: CPT CODE:            



                                            [id number] XR WRIST 2            



                                            [place] [address]            



                                            (Continued) Orig Print            



                                            D/T: S: 2021            



                                            (1742) PAGE 2 Signed            



                                            Report                

 

           - XR HIP BI 2021            **********************            



           W/PELVIS   15:33:00              **********************            



                                            [facility] 1935            



                                            Sex:                  



                                            F*********************            



                                            **********************            



                                            ***************** FAX:            



                                            Lashell Boswell MD            



                                            [phone] [place]:            



                                             St: PRE FAX:            



                                            James Rueda DO            



                                            ----------------------            



                                            ----------------------            



                                            ----------------------            



                                            ------------- Name:            



                                            LY FRANCIS [facility] :            



                                            1935 Age/S: 86/F            



                                            [address]:            



                                            [id number] [place]: [place] [address]            



                                            Phys: James Rueda DO  Acct:            



                                            [id number] Dis Date:            



                                            Status: PRE ER PHONE            



                                            #: [phone] Exam            



                                            Date: 2021 1459            



                                            FAX #: [phone]            



                                            Reason: fall/head            



                                            stiek/ neck pain/pain            



                                            pain/hip pain/ EXAMS:            



                                            CPT CODE: [id number] XR            



                                            HIP BI W/PELVIS 76867            



                                            EXAM: - XR HIP BI            



                                            W/PELVIS HISTORY:            



                                            fall/head/neck            



                                            pain/pain pain/hip            



                                            pain/ knee pain            



                                            COMPARISON: None            



                                            available time of            



                                            interpretation.            



                                            FINDINGS: AP and            



                                            frog-leg lateral views            



                                            of both hips and            



                                            single view of the            



                                            pelvis are provided.            



                                            Intact right hip            



                                            hemiarthroplasty. No            



                                            significant            



                                            degenerative changes            



                                            of the left hip joint.            



                                            No acute fracture.            



                                            IMPRESSION: No acute            



                                            findings. **            



                                            Electronically Signed            



                                            by Gonzalo Wilson MD on            



                                            2021 at 1533 **            



                                            Reported and signed            



                                            by: Gonzalo Wilson MD            



                                            CC: Lashell Boswell MD;            



                                            James Rueda DO            



                                            Technologist: Alexandru Walton            



                                            Date/Time/By:            



                                            2021 (1023) :            



                                            By: [email or web address] PAGE 1            



                                            Signed Report FAX:            



                                            Lashell Boswell MD            



                                            [phone] [place]:            



                                             St: PRE FAX:            



                                            James Rueda DO            



                                            ----------------------            



                                            ----------------------            



                                            ----------------------            



                                            ------------- Name:            



                                            LY FRANCIS [facility] :            



                                            1935 Age/S: 86/F            



                                            [address]:            



                                            [id number] Loc: [place]            



                                            [place] [address]            



                                            Phys: James Rueda DO R2  Acct:            



                                            [id number] Dis Date:            



                                            Status: PRE ER PHONE            



                                            #: [phone] Exam            



                                            Date: 2021 1459            



                                            FAX #: [phone]            



                                            Reason: fall/head            



                                            stiek/ neck pain/pain            



                                            pain/hip pain/ EXAMS:            



                                            CPT CODE: [id number] XR            



                                            HIP BI W/PELVIS [address]            



                                            <Continued> Orig Print            



                                            D/T: S: 2021            



                                            (1716) PAGE 2  Signed            



                                            Report                

 

           - XR HIP BI 2021            **********************            



           W/PELVIS   15:33:00              **********************            



                                            [facility] : 1935            



                                            Sex:                  



                                            F*********************            



                                            **********************            



                                            ***************** FAX:            



                                            Lashell Boswell MD            



                                            [phone] [place]:            



                                            [facility]: [facility] FAX:            



                                            James Rueda DO            



                                            ----------------------            



                                            ----------------------            



                                            ----------------------            



                                            ------------- Name:            



                                            LY FRANCIS :            



                                            1935 Age/S: 86/F            



                                            [address]:            



                                            [id number] Loc: [place]            



                                            [place] [address]            



                                            Phys: James Rueda DO R2 Acct:            



                                            [id number] Dis Date:            



                                            Status: DEP ER  PHONE            



                                            #: [phone] Exam            



                                            Date: 2021 1459            



                                            FAX #: [phone]            



                                            Reason: fall/head            



                                            stiek/ neck pain/pain            



                                            pain/hip pain/ EXAMS:            



                                            CPT CODE: [id number] XR            



                                            HIP BI W/PELVIS 66492            



                                            EXAM: - XR HIP BI            



                                            W/PELVIS HISTORY:            



                                            fall/head/neck            



                                            pain/pain pain/hip            



                                            pain/ knee pain            



                                            COMPARISON: None            



                                            available time of            



                                            interpretation.            



                                            FINDINGS: AP and            



                                            frog-leg lateral views            



                                            of both hips and            



                                            single view of the            



                                            pelvis are provided.            



                                            Intact right hip            



                                            hemiarthroplasty. No            



                                            significant            



                                            degenerative changes            



                                            of the left hip joint.            



                                            No acute fracture.            



                                            IMPRESSION: No acute            



                                            findings. **            



                                            Electronically Signed            



                                            by Gonzalo Wilson MD on            



                                            2021 at 1533 **            



                                            Reported and signed            



                                            by: Gonzalo Wilson MD            



                                            CC: Lashell Boswell MD;            



                                            James Rueda DO            



                                            Technologist: Alexandru Walton            



                                            Date/Time/By:            



                                            2021 (1533) :            



                                            By: [email or web address] PAGE 1            



                                            Signed Report FAX:            



                                            Lashell Boswell MD            



                                            [phone] [place]:            



                                            [facility]: [facility] FAX:            



                                            James Rueda DO            



                                            ----------------------            



                                            ----------------------            



                                            ----------------------            



                                            ------------- Name:            



                                            LY FRANCIS :            



                                            1935 Age/S: 86/F            



                                            [address]:            



                                            [id number] Loc: [place]            



                                            [place] [address]            



                                            Phys: James Rueda DO R2 Acct:            



                                            [id number] Dis Date:            



                                            Status: DEP ER PHONE            



                                            #: [phone] Exam            



                                            Date:  2021 4349            



                                            FAX #: [phone]            



                                            Reason: fall/head            



                                            stiek/ neck pain/pain            



                                            pain/hip pain/ EXAMS:            



                                            CPT CODE: [id number] XR            



                                            HIP BI W/PELVIS 60184            



                                            (Continued) Orig Print            



                                            D/T: S: 2021            



                                            (1536)  PAGE 2 Signed            



                                            Report                

 

           - XR HAND 3 + V RT 2021            **********************      

      



                      15:31:00              **********************            



                                            [facility] 1935            



                                            Sex:                  



                                            F*********************            



                                            **********************            



                                            ***************** FAX:            



                                            Lashell Boswell MD            



                                            [phone] [place]:            



                                             St: PRE FAX:            



                                            James Rueda DO            



                                            ----------------------            



                                            ----------------------            



                                            ----------------------            



                                            ------------- Name:            



                                            LY FRANCIS :            



                                            1935 Age/S: 86/F            



                                            [address]  [address]            



                                            #: [id number] Loc:            



                                            [place]            



                                            [address] Phys:            



                                            James Rudea            



                                            Acct: [id number] Dis            



                                            Date:  Status: PRE ER            



                                            PHONE #: [phone]            



                                            Exam Date: 2021            



                                            1459 FAX #:            



                                            [phone] Reason:            



                                            fall/head stiek/ neck            



                                            pain/pain pain/hip            



                                            pain/ EXAMS: CPT CODE:            



                                            [id number] XR HAND 3 +            



                                            [place] [address] EXAM: - XR            



                                            WRIST 3 + V RT, - XR            



                                            FOREARM 2 VIEWS RT, -            



                                            XR HAND 3 + V RT            



                                            HISTORY:              



                                            fall/head/neck            



                                            pain/pain pain/hip            



                                            pain/ knee pain            



                                            COMPARISON: None            



                                            available time of            



                                            interpretation.            



                                            FINDINGS: Frontal,            



                                            oblique, and lateral            



                                            views of the right            



                                            wrist and hand are            



                                            provided. 2 views of            



                                            the right forearm.            



                                            Comminuted,            



                                            intra-articular            



                                            displaced fracture of            



                                            the distal radius.            



                                            Soft tissue swelling            



                                            surrounds the fracture            



                                            site. IMPRESSION:            



                                            Distal radial fracture            



                                            as above. **            



                                            Electronically Signed            



                                            by Gonzalo Wilson MD on            



                                            2021 at 1531 **            



                                            Reported and signed            



                                            by: Gonzalo Wilson MD            



                                            CC: Lashell Boswell MD;            



                                            James Rueda DO            



                                            Technologist: Alexandru Walton            



                                            Date/Time/By:            



                                            2021 (1531) :            



                                            By: [email or web address] PAGE 1            



                                            Signed Report FAX:            



                                            Lashell Boswell MD            



                                            [phone] [place]:            



                                             St: PRE FAX:            



                                            James Rueda DO            



                                            ----------------------            



                                            ----------------------            



                                            ----------------------            



                                            ------------- Name:            



                                            YL FRANCIS :            



                                            1935 Age/S: 86/F            



                                            [address]:            



                                            [id number] Loc: [place]            



                                            [place] [address]            



                                            Phys: Rowlands,James GÓMEZ R2  Acct:            



                                            [id number] Dis Date:            



                                            Status: PRE ER PHONE            



                                            #: [phone] Exam            



                                            Date: 2021            



                                            FAX #: [phone]            



                                            Reason: fall/head            



                                            stiek/ neck pain/pain            



                                            pain/hip pain/ EXAMS:            



                                            CPT CODE: [id number] XR            



                                            HAND 3 + V RT 98346            



                                            <Continued> Orig Print            



                                            D/T: S: 2021            



                                            (1118) PAGE 2  Signed            



                                            Report                

 

           - XR FOREARM 2 2021            **********************          

  



           VIEWS RT   15:31:00              **********************            



                                            [facility] : 1935            



                                            Sex:                  



                                            F*********************            



                                            **********************            



                                            ***************** FAX:            



                                            Lashell Boswell MD            



                                            [phone] [place]:            



                                             St: PRE FAX:            



                                            James Rueda DO            



                                            ----------------------            



                                            ----------------------            



                                            ----------------------            



                                            ------------- Name:            



                                            LY FRANCIS [facility] :            



                                            1935 Age/S: 86/F            



                                            [address]:            



                                            [id number] Loc: [place]            



                                            [place] [address]            



                                            Phys: James Rueda Acct:            



                                            [id number] Dis Date:            



                                            Status: PRE ER  PHONE            



                                            #: [phone] Exam            



                                            Date: 2021            



                                            FAX #: [phone]            



                                            Reason: fall/head            



                                            stiek/ neck pain/pain            



                                            pain/hip pain/ EXAMS:            



                                            CPT CODE: [id number] XR            



                                            FOREARM 2 VIEWS RT            



                                            60413 EXAM: - XR WRIST            



                                            3 + V RT, - XR FOREARM            



                                            2 VIEWS RT, - XR HAND            



                                            3 + V RT HISTORY:            



                                            fall/head/neck            



                                            pain/pain pain/hip            



                                            pain/ knee pain            



                                            COMPARISON: None            



                                            available time of            



                                            interpretation.            



                                            FINDINGS: Frontal,            



                                            oblique, and lateral            



                                            views of the right            



                                            wrist and hand are            



                                            provided. 2 views of            



                                            the right forearm.            



                                            Comminuted,            



                                            intra-articular            



                                            displaced fracture of            



                                            the distal radius.            



                                            Soft tissue swelling            



                                            surrounds the fracture            



                                            site. IMPRESSION:            



                                            Distal radial fracture            



                                            as above. **            



                                            Electronically Signed            



                                            by Gonzalo Wilson MD on            



                                            2021 at 1531 **            



                                            Reported and signed            



                                            by: Gonzalo Wilson MD            



                                            CC: Lashell Boswell MD;            



                                            James Rueda DO            



                                            Technologist: [facility]ru [facility]ides [facility]            



                                            Date/Time/By:            



                                            2021 (829) :            



                                            By: [email or web address] PAGE 1            



                                            Signed Report FAX:            



                                            Lashell Boswell MD            



                                            [phone] [place]:            



                                             St: PRE FAX:            



                                            James Rueda DO            



                                            ----------------------            



                                            ----------------------            



                                            ----------------------            



                                            ------------- Name:            



                                            LY FRANCIS :            



                                            1935 Age/S: 86/F            



                                            [address]  [address]            



                                            #: [id number] [place]            



                                            [place]            



                                            [address] Phys:            



                                            James Rueda            



                                            Acct: [id number] Dis            



                                            Date:  Status: PRE ER            



                                            PHONE #: [phone]            



                                            Exam Date: 2021            



                                            5930 FAX #:            



                                            [phone] Reason:            



                                            fall/head stiek/ neck            



                                            pain/pain pain/hip            



                                            pain/ EXAMS: CPT CODE:            



                                            [id number] XR FOREARM 2            



                                            [place] [address]            



                                            <Continued> Orig Print            



                                            D/T: S: 2021            



                                            (6012) PAGE 2 Signed            



                                            Report                

 

           - XR WRIST 3 + V 2021            **********************        

    



           RT         15:31:00              **********************            



                                            [facility] 1935            



                                            Sex:                  



                                            F*********************            



                                            **********************            



                                            ***************** FAX:            



                                            Lashell Boswell MD            



                                            [phone] [place]:            



                                             St: PRE FAX:            



                                            James Rueda DO            



                                            ----------------------            



                                            ----------------------            



                                            ----------------------            



                                            ------------- Name:            



                                            LY FRANCIS [facility] :            



                                            1935 Age/S: 86/F            



                                            [address]:            



                                            [id number] Loc: [place]            



                                            [place] [address]            



                                            Phys: James Rueda DO R2 Acct:            



                                            [id number] Dis Date:            



                                            Status: PRE ER PHONE            



                                            #: [phone] Exam            



                                            Date: 2021 0100            



                                            FAX #: [phone]            



                                            Reason: fall/head            



                                            stiek/ neck pain/pain            



                                            pain/hip pain/ EXAMS:            



                                            CPT CODE: [id number] XR            



                                            WRIST 3 + V RT 16414            



                                            EXAM: - XR WRIST 3 + V            



                                            RT, - XR FOREARM 2            



                                            VIEWS RT, - XR HAND 3            



                                            + V RT HISTORY:            



                                            fall/head/neck            



                                            pain/pain pain/hip            



                                            pain/ knee pain            



                                            COMPARISON: None            



                                            available time of            



                                            interpretation.            



                                            FINDINGS: Frontal,            



                                            oblique, and lateral            



                                            views of the right            



                                            wrist and hand are            



                                            provided. 2 views of            



                                            the right forearm.            



                                            Comminuted,            



                                            intra-articular            



                                            displaced fracture of            



                                            the distal radius.            



                                            Soft tissue swelling            



                                            surrounds the fracture            



                                            site. IMPRESSION:            



                                            Distal radial fracture            



                                            as above. **            



                                            Electronically Signed            



                                            by Gonzalo Wilson MD on            



                                            2021 at 1531 **            



                                            Reported and signed            



                                            by: Gonzalo Wilson MD            



                                            CC: Lashell Boswell MD;            



                                            James Rueda DO            



                                            Technologist: Alexandru Walton            



                                            Date/Time/By:            



                                            2021 (862) :            



                                            By: [email or web address] PAGE 1            



                                            Signed Report FAX:            



                                            Lashell Boswell MD            



                                            [phone] [place]:            



                                             St: PRE FAX:            



                                            James Rueda DO            



                                            ----------------------            



                                            ----------------------            



                                            ----------------------            



                                            -------------  Name:            



                                            LY FRANCIS [facility] :            



                                            1935 Age/S: 86/F            



                                            [address]:            



                                            [id number] Loc: [place]            



                                            [place] [address]            



                                            Phys: James Rueda DO R2 Acct:            



                                            [id number] Dis Date:            



                                            Status: PRE ER PHONE            



                                            #: [phone] Exam            



                                            Date: 2021 1459            



                                            FAX #: [phone]            



                                            Reason: fall/head            



                                            stiek/ neck pain/pain            



                                            pain/hip pain/ EXAMS:            



                                            CPT CODE: [id number] XR            



                                            WRIST 3 + V RT 37316            



                                            <Continued> Orig Print            



                                            D/T: S: 2021            



                                            (4762)  PAGE 2 Signed            



                                            Report                

 

           - XR HAND 3 + V RT 2021            **********************      

      



                      15:31:00              **********************            



                                            [facility]  : 1935            



                                            Sex:                  



                                            F*********************            



                                            **********************            



                                            ***************** FAX:            



                                            Lashell Boswell MD            



                                            [phone] [place]:            



                                            [facility]: [facility] FAX:            



                                            James Rueda DO            



                                            ----------------------            



                                            ----------------------            



                                            ----------------------            



                                            ------------- Name:            



                                            LY FRANCIS :            



                                            1935 Age/S: 86/F            



                                            [address]:            



                                            [id number] Loc: [place]            



                                            [place] [address]            



                                            Phys: James Rueda DO R2  Acct:            



                                            [id number] Dis Date:            



                                            Status: DEP ER PHONE            



                                            #: [phone] Exam            



                                            Date: 2021 1459            



                                            FAX #: [phone]            



                                            Reason: fall/head            



                                            stiek/ neck pain/pain            



                                            pain/hip pain/ EXAMS:            



                                            CPT CODE: [id number] XR            



                                            HAND 3 + V RT 12356            



                                            EXAM: - XR WRIST 3 + V            



                                            RT, - XR FOREARM 2            



                                            VIEWS RT, - XR HAND 3            



                                            + V RT HISTORY:            



                                            fall/head/neck            



                                            pain/pain pain/hip            



                                            pain/ knee pain            



                                            COMPARISON: None            



                                            available time of            



                                            interpretation.            



                                            FINDINGS: Frontal,            



                                            oblique, and lateral            



                                            views of the right            



                                            wrist and hand are            



                                            provided. 2 views of            



                                            the right forearm.            



                                            Comminuted,            



                                            intra-articular            



                                            displaced fracture of            



                                            the distal radius.            



                                            Soft tissue swelling            



                                            surrounds the fracture            



                                            site. IMPRESSION:            



                                            Distal radial fracture            



                                            as above.  **            



                                            Electronically Signed            



                                            by Gonzalo Wilson MD on            



                                            2021 at 1531 **            



                                            Reported and signed            



                                            by: Gonzalo Wilson MD            



                                            CC: Lashell Boswell MD;            



                                            James Rueda DO            



                                            Technologist: Alexandru Benavides Trnscrd            



                                            Date/Time/By:            



                                            2021 (153) :            



                                            By: [email or web address] PAGE 1            



                                            Signed Report FAX:            



                                            Lashell Boswell MD            



                                            [phone] [place]:            



                                            [facility]: [facility] FAX:            



                                            James Rueda DO            



                                            ----------------------            



                                            ----------------------            



                                            ----------------------            



                                            ------------- Name:            



                                            LY FRANCIS :            



                                            1935 Age/S: 86/F            



                                            [address]:            



                                            [id number] Loc: [place]            



                                            [place] [address]            



                                            Phys: James Rueda Acct:            



                                            [id number] Dis Date:            



                                            Status: DEP ER PHONE            



                                            #: [phone] Exam            



                                            Date: 2021 1459            



                                            FAX #: [phone]            



                                            Reason: fall/head            



                                            stiek/ neck pain/pain            



                                            pain/hip pain/ EXAMS:            



                                            CPT CODE: [id number] XR            



                                            HAND 3 + V RT 87721            



                                            (Continued) Orig Print            



                                            D/T: S: 2021            



                                            (1535)  PAGE 2 Signed            



                                            Report                

 

           - XR WRIST 3 + V 2021            **********************        

    



           RT         15:31:00              **********************            



                                            ****************[facility]            



                                            FLORName: LY FRANCIS : 1935            



                                            Sex:                  



                                            F*********************            



                                            **********************            



                                            ***************** FAX:            



                                            Lashell Boswell MD            



                                            [phone] [place]:            



                                             St: DEP FAX:            



                                            James Rueda DO            



                                            ----------------------            



                                            ----------------------            



                                            ----------------------            



                                            ------------- Name:            



                                            LY FRANCIS :            



                                            1935 Age/S: 86/F            



                                            [address]:            



                                            [id number] Loc: [place]            



                                            [place] [address]            



                                            Phys: James Rueda Acct:            



                                            [id number] Dis Date:            



                                            Status: [facility] ER  PHONE            



                                            #: [phone] Exam            



                                            Date: 2021 1459            



                                            FAX #: [phone]            



                                            Reason: fall/head            



                                            stiek/ neck pain/pain            



                                            pain/hip pain/ EXAMS:            



                                            CPT CODE: [id number] XR            



                                            WRIST 3 + V RT 51440            



                                            EXAM: - XR WRIST 3 + V            



                                            RT, - XR FOREARM 2            



                                            VIEWS RT, - XR HAND 3            



                                            + V RT HISTORY:            



                                            fall/head/neck            



                                            pain/pain pain/hip            



                                            pain/ knee pain            



                                            COMPARISON: None            



                                            available time of            



                                            interpretation.            



                                            FINDINGS:  Frontal,            



                                            oblique, and lateral            



                                            views of the right            



                                            wrist and hand are            



                                            provided. 2 views of            



                                            the right forearm.            



                                            Comminuted,            



                                            intra-articular            



                                            displaced fracture of            



                                            the distal radius.            



                                            Soft tissue swelling            



                                            surrounds the fracture            



                                            site. IMPRESSION:            



                                            Distal radial fracture            



                                            as above. **            



                                            Electronically Signed            



                                            by Gonzalo Wilson MD on            



                                            2021 at 1531 **            



                                            Reported and signed            



                                            by: Gonzalo Wilson MD            



                                            CC: Lashell Boswell MD;            



                                            James Rueda DO            



                                            Technologist: Alexandru Odonnellsclucina            



                                            Date/Time/By:            



                                            2021 (1531) :            



                                            By: [email or web address]  PAGE 1            



                                            Signed Report FAX:            



                                            Lashell Boswell MD            



                                            [phone] [place]:            



                                             St: [facility] FAX:            



                                            James Rueda DO            



                                            ----------------------            



                                            ----------------------            



                                            ----------------------            



                                            ------------- Name:            



                                            LY FRANCIS :            



                                            1935 Age/S: 86/F            



                                             [address]            



                                            #: [id number] Loc:            



                                            [place]            



                                            [address] Phys:            



                                            Jamse Rueda            



                                            Acct: [id number] Dis            



                                            Date: Status: [facility] ER            



                                            PHONE #: [phone]            



                                            Exam Date: 2021 FAX #:            



                                            [phone] Reason:            



                                            fall/head stiek/ neck            



                                            pain/pain pain/hip            



                                            pain/ EXAMS: CPT CODE:            



                                            [id number] XR WRIST 3 +            



                                            V RT 71617 (Continued)            



                                            Orig Print D/T: S:            



                                            2021 (2438) PAGE            



                                            2 Signed Report            

 

           - XR FOREARM 2 2021            **********************          

  



           VIEWS RT   15:31:00              **********************            



                                            [facility] 1935            



                                            Sex:                  



                                            F*********************            



                                            **********************            



                                            ***************** FAX:            



                                            Lashell Boswell MD            



                                            [phone] [place]:            



                                             St: [facility] FAX:            



                                            James Rueda DO            



                                            ----------------------            



                                            ----------------------            



                                            ----------------------            



                                            ------------- Name:            



                                            LY FRANCIS [facility] :            



                                            1935 Age/S: 86/F            



                                            [address]:            



                                            [id number] Loc: [place]            



                                            [place] [address]            



                                            Phys: James Rueda Acct:            



                                            [id number] Dis Date:            



                                            Status: DEP ER PHONE            



                                            #: [phone]  Exam            



                                            Date: 2021            



                                            FAX #: [phone]            



                                            Reason: fall/head            



                                            stiek/ neck pain/pain            



                                            pain/hip pain/ EXAMS:            



                                            CPT CODE: [id number] XR            



                                            FOREARM 2 VIEWS RT            



                                            61753 EXAM: - XR WRIST            



                                            3 + V RT, - XR FOREARM            



                                            2 VIEWS RT, - XR HAND            



                                            3 + V RT HISTORY:            



                                            fall/head/neck            



                                            pain/pain pain/hip            



                                            pain/ knee pain            



                                            COMPARISON: None            



                                            available time of            



                                            interpretation.            



                                            FINDINGS: Frontal,            



                                            oblique, and lateral            



                                            views of the right            



                                            wrist and hand are            



                                            provided. 2 views of            



                                            the right forearm.            



                                            Comminuted,            



                                            intra-articular            



                                            displaced fracture of            



                                            the distal radius.            



                                            Soft tissue swelling            



                                            surrounds the fracture            



                                            site. IMPRESSION:            



                                            Distal radial fracture            



                                            as above. **            



                                            Electronically Signed            



                                            by Gonzalo Wilson MD on            



                                            2021 at 1531 **            



                                            Reported and signed            



                                            by: Gonzalo Wilson MD            



                                            CC: Lashell Boswell MD;            



                                            James Rueda DO            



                                            Technologist: Alexandru Benavides Trnscrd            



                                            Date/Time/By:            



                                            2021 (153) :            



                                            By: [email or web address] PAGE 1            



                                            Signed Report  FAX:            



                                            Lashell Boswell MD            



                                            [phone] [place]:            



                                             St: DEP FAX:            



                                            James Rueda DO            



                                            ----------------------            



                                            ----------------------            



                                            ----------------------            



                                            ------------- Name:            



                                            LY FRANCIS :            



                                            1935 Age/S: 86/F            



                                            [address]:            



                                            [id number]  Loc: [place] [address]            



                                            Phys: James Rueda DO R2 Acct:            



                                            [id number] Dis Date:            



                                            Status: DEP ER  PHONE            



                                            #: [phone] Exam            



                                            Date: 2021 0072            



                                            FAX #: [phone]            



                                            Reason: fall/head            



                                            stiek/ neck pain/pain            



                                            pain/hip pain/ EXAMS:            



                                            CPT CODE: [id number] XR            



                                            FOREARM 2 VIEWS RT            



                                            32600 (Continued) Orig            



                                            Print D/T: S:            



                                            2021 (1535)            



                                            PAGE 2 Signed Report            

 

           - XR KNEE 3 V RT 2021            **********************        

    



                      15:25:00              **********************            



                                            ****************[facility]            



                                            WOODName: LY FRANCIS : 1935            



                                            Sex:                  



                                            F*********************            



                                            **********************            



                                            ***************** FAX:            



                                            Lashell Boswell MD            



                                            [phone] [place]:            



                                             St: PRE FAX:            



                                            James Rueda DO            



                                            ----------------------            



                                            ----------------------            



                                            ----------------------            



                                            ------------- Name:            



                                            LY FRANCIS [facility]  :            



                                            1935 Age/S: 86/F            



                                            [address]:            



                                            [id number] Loc: [place] [address]            



                                            Phys: James Rueda DO R2  Acct:            



                                            [id number] Dis Date:            



                                            Status: PRE ER PHONE            



                                            #: [phone] Exam            



                                            Date: 2021 1459            



                                            FAX #: [phone]            



                                            Reason: fall/head            



                                            stiek/ neck pain/pain            



                                            pain/hip pain/ EXAMS:            



                                            CPT CODE: [id number] XR            



                                            KNEE 3 V RT  66837            



                                            EXAMINATION(S): 3            



                                            views right knee            



                                            INDICATION: Right knee            



                                            pain COMPARISON: None            



                                            LOCATION: S17            



                                            FINDINGS:             



                                            Tricompartmental            



                                            degenerative changes,            



                                            severe in the            



                                            patellofemoral            



                                            compartment and            



                                            otherwise moderate. No            



                                            fracture or            



                                            dislocation            



                                            identified. Possible            



                                            joint effusion.            



                                            IMPRESSION: No acute            



                                            osseous abnormality            



                                            identified. **            



                                            Electronically Signed            



                                            by Carlos Manuel Navas MD on            



                                            2021 at 1525 **            



                                            Reported and signed            



                                            by: Carlos Manuel Navas MD CC:            



                                            Lashell Boswell MD; James Rueda DO            



                                            Technologist: Alexandru Benavides Trnscrd            



                                            Date/Time/By:            



                                            2021 (1525) :            



                                            By: [email or web address] PAGE 1            



                                            Signed Report  FAX:            



                                            Lashell Boswell MD            



                                            [phone] [place]:            



                                             St: PRE FAX:            



                                            James Rueda DO            



                                            ----------------------            



                                            ----------------------            



                                            ----------------------            



                                            ------------- Name:            



                                            LY FRANCIS [facility]            



                                            [place] :            



                                            1935 Age/S: 86/F            



                                            [address]:            



                                            [id number] Loc: [place]            



                                            [place] [address]            



                                            Phys: MohitJames GÓMEZ  Acct:            



                                            [id number] Dis Date:            



                                            Status: PRE ER  PHONE            



                                            #: [phone] Exam            



                                            Date: 2021 1459            



                                            FAX #: [phone]            



                                            Reason: fall/head            



                                            stiek/ neck pain/pain            



                                            pain/hip pain/ EXAMS:            



                                            CPT CODE: [id number] XR            



                                            KNEE 3 V RT 70380            



                                            <Continued> Orig Print            



                                            D/T: S: 2021            



                                            (1528)  PAGE 2 Signed            



                                            Report                

 

           - XR KNEE 3 V RT 2021            **********************        

    



                      15:25:00              **********************            



                                            [facility] 1935            



                                            Sex:                  



                                            F*********************            



                                            **********************            



                                            ***************** FAX:            



                                            Lashell Boswell MD            



                                            [phone] [place]:            



                                            [facility]: DEP FAX:            



                                            James Rueda DO            



                                            ----------------------            



                                            ----------------------            



                                            ----------------------            



                                            ------------- Name:            



                                            LY FRANCIS  :            



                                            1935 Age/S: 86/F            



                                            [address]:            



                                            [id number] Loc: [place]            



                                            [place] [address]            



                                            Phys: MohitJames            



                                            DO COYNE  Acct:            



                                            [id number] Dis Date:            



                                            Status: DEP ER PHONE            



                                            #: [phone] Exam            



                                            Date: 2021 7575            



                                            FAX #: [phone]            



                                            Reason: fall/head            



                                            stiek/ neck pain/pain            



                                            pain/hip pain/ EXAMS:            



                                            CPT CODE: [id number] XR            



                                            KNEE 3 V RT  37399            



                                            EXAMINATION(S): 3            



                                            views right knee            



                                            INDICATION: Right knee            



                                            pain COMPARISON: None            



                                            LOCATION: S17            



                                            FINDINGS:             



                                            Tricompartmental            



                                            degenerative changes,            



                                            severe in the            



                                            patellofemoral            



                                            compartment and            



                                            otherwise moderate. No            



                                            fracture or            



                                            dislocation            



                                            identified. Possible            



                                            joint effusion.            



                                            IMPRESSION: No acute            



                                            osseous abnormality            



                                            identified. **            



                                            Electronically Signed            



                                            by Carlos Manuel Navas MD on            



                                            2021 at 1525 **            



                                            Reported and signed            



                                            by: Carlos Manuel Navas MD CC:            



                                            Lashell Boswell MD; James Rueda DO            



                                            Technologist: Alexandru Walton            



                                            Date/Time/By:            



                                            2021 (1525) :            



                                            By: [email or web address] PAGE 1            



                                            Signed Report FAX:            



                                            Lashell Boswell MD            



                                            [phone] [place]:            



                                            [facility]: [facility] FAX:            



                                            James Rueda DO            



                                            ----------------------            



                                            ----------------------            



                                            ----------------------            



                                            ------------- Name:            



                                            LY FRANCIS :            



                                            1935 Age/S: 86/F            



                                            [address]:            



                                            [id number] Loc: [place]            



                                            [place] [address]            



                                            Phys: James Rueda Acct:            



                                            [id number] Dis Date:            



                                            Status: DEP ER PHONE            



                                            #: [phone] Exam            



                                            Date: 2021 1459            



                                            FAX #: [phone]            



                                            Reason: fall/head            



                                            stiek/ neck pain/pain            



                                            pain/hip pain/  EXAMS:            



                                            CPT CODE: [id number] XR            



                                            KNEE 3 V RT 45351            



                                            (Continued) Orig Print            



                                            D/T: S: 2021            



                                            (1528)  PAGE 2 Signed            



                                            Report                

 

           - CT T-SPINE W/O 2021            **********************        

    



           CONTRAST   14:53:00              **********************            



                                            [facility] 1935            



                                            Sex:                  



                                            F*********************            



                                            **********************            



                                            ***************** FAX:            



                                            Lashell Boswell MD            



                                            [phone] [place]:            



                                             St: PRE FAX:            



                                            James Rueda DO            



                                            ----------------------            



                                            ----------------------            



                                            ----------------------            



                                            ------------- Name:            



                                            LY FRANCIS [facility] :            



                                            1935 Age/S: 86/F            



                                            [address]:            



                                            [id number] Loc: [place]            



                                            [place] [address]            



                                            Phys: James Rueda Acct:            



                                            [id number] Dis Date:            



                                            Status: PRE ER PHONE            



                                            #: [phone] Exam            



                                            Date: 2021 1435            



                                            FAX #: [phone]            



                                            Reason: fall/head            



                                            stiek/ neck pain/pain            



                                            pain/hip pain/ EXAMS:            



                                            CPT CODE: [id number] CT            



                                            T-SPINE W/O CONTRAST            



                                            33746 Exam: CT of the            



                                            thoracic and lumbar            



                                            spine without contrast            



                                            Location: H24 HISTORY:            



                                            , fall/head / neck            



                                            pain/pain pain/hip            



                                            pain/ knee pain,            



                                            COMPARISON: None            



                                            available TECHNIQUE:            



                                            Axial images of the            



                                            thoracic and lumbar            



                                            spine were obtained            



                                            without contrast.            



                                            Images were            



                                            reformatted to create            



                                            coronal and sagittal            



                                            reconstructions . One            



                                            or more of the            



                                            following dose            



                                            reduction techniques            



                                            were used: Automated            



                                            exposure control,            



                                            adjustment of the mA            



                                            and/or kV according to            



                                            patient size, and/or            



                                            utilization of            



                                            iterative             



                                            reconstruction            



                                            technique. DLP: 511            



                                            mGy-cm. FINDINGS:            



                                            Thoracic spine There            



                                            is diffuse osteopenia.            



                                            There is exaggeration            



                                            of normal thoracic            



                                            kyphosis. There is no            



                                            acute fracture or            



                                            dislocation. Vertebral            



                                            body heights are still            



                                            maintained. There is            



                                            bridging anterior            



                                            aspect stenosis            



                                            throughout the mid            



                                            thoracic spine as well            



                                            as partial acquired            



                                            fusion of the disc            



                                            spaces from T6-T7            



                                            through T10-T11. There            



                                            is atrophy of            



                                            paraspinal muscles.            



                                            Emphysematous changes            



                                            are noted in the            



                                            lungs. On axial            



                                            images, there is no            



                                            significant central            



                                            canal stenosis. There            



                                            is hypertrophy of            



                                            facet joints            



                                            contributing to            



                                            foraminal narrowing            



                                            particularly from            



                                            T6-T7 through T11-T12.            



                                            FINDINGS: Lumbar spine            



                                            There is diffuse            



                                            osteopenia. No            



                                            displaced fracture or            



                                            dislocation is seen.            



                                            Vertebral body heights            



                                            are maintained. Vacuum            



                                            disc phenomena are            



                                            present from L2-L3            



                                            through L5-S1 with            



                                            associated disc space            



                                            narrowing. There is            



                                            sclerosis between            



                                            spinous processes from            



                                            L2 through S1 likely            



                                            representing            



                                            Baastrup's pathology.            



                                            There is atrophy of            



                                            paraspinal muscles.            



                                            Diverticula are            



                                            present within the            



                                            sigmoid colon.            



                                            Visualized            



                                            intra-abdominal            



                                            contents are otherwise            



                                            within normal limits.            



                                            On axial images, there            



                                            is both spondylosis            



                                            and facet arthrosis            



                                            contributing to            



                                            central canal stenosis            



                                            at the L2-L3 through            



                                            L5-S1 levels, most            



                                            pronounced at L3-L4            



                                            and L4-L5. There is            



                                            also foraminal            



                                            narrowing from T12-L1            



                                            through L5-S1.            



                                            IMPRESSION: PAGE 1            



                                            Signed Report            



                                            (CONTINUED) FAX:            



                                            Lashell Boswell MD            



                                            [phone] [place]:            



                                             St: PRE FAX:            



                                            James Rueda DO            



                                            ----------------------            



                                            ----------------------            



                                            ----------------------            



                                            ------------- Name:            



                                            LY FRANCIS  [facility]            



                                            [place] :            



                                            1935 Age/S: 86/F            



                                            [address]:            



                                            [id number] Loc: [place] [address]            



                                            Phys: James Rueda DO R2 Acct:            



                                            [id number] Dis Date:            



                                            Status: PRE ER PHONE            



                                            #: [phone]  Exam            



                                            Date: 2021 1435            



                                            FAX #: [phone]            



                                            Reason: fall/head            



                                            stiek/ neck pain/pain            



                                            pain/hip pain/ EXAMS:            



                                            CPT CODE: [id number] CT            



                                            T-SPINE W/O CONTRAST            



                                            [id number] <Continued> 1.            



                                            Diffuse osteopenia. No            



                                            displaced fracture or            



                                            dislocation. 2.            



                                            Exaggeration of normal            



                                            thoracic kyphosis.            



                                            Vertebral body heights            



                                            are maintained. 3.            



                                            Spondylosis and facet            



                                            arthrosis contribute            



                                            to canal stenosis from            



                                            L2-L3 through L5-S1.            



                                            Foraminal narrowing is            



                                            present from T6-T7            



                                            through L5-S1. **            



                                            Electronically Signed            



                                            by Mejia Herrera MD on            



                                            2021 at 1453 **            



                                            Reported and signed            



                                            by: Mejia Herrera MD  CC:            



                                            Lashell Boswell MD; James Rueda DO            



                                            Technologist: Katie Pelaez Trnscrd            



                                            Dt/Tm: 2021            



                                            (3018) [email or web address] Orig            



                                            Print D/T: S:            



                                            2021 (3927  PAGE            



                                            2 Signed Report            

 

           - CT L-SPINE W/O 2021            **********************        

    



           CONTRAST   14:53:00              **********************            



                                            [facility] 1935            



                                            Sex:                  



                                            F*********************            



                                            **********************            



                                            ***************** FAX:            



                                            Lashell Boswell MD            



                                            [phone] [place]:            



                                             St: PRE FAX:            



                                            [place],James DO            



                                            ----------------------            



                                            ----------------------            



                                            ----------------------            



                                            ------------- Name:            



                                            LY FRANCIS :            



                                            1935 Age/S: 86/F            



                                            [address]:            



                                            [id number] Loc: [place]            



                                            [place] [address]            



                                            Phys: James Rueda DO R2 Acct:            



                                            [id number] Dis Date:            



                                            Status: PRE ER PHONE            



                                            #: [phone] Exam            



                                            Date: 2021 1435            



                                            FAX #: [phone]            



                                            Reason: fall/head            



                                            stiek/ neck pain/pain            



                                            pain/hip pain/  EXAMS:            



                                            CPT CODE: [id number] CT            



                                            L-SPINE W/O CONTRAST            



                                            [id number] Exam: CT of the            



                                            thoracic and lumbar            



                                            spine without contrast            



                                            Location: [facility] HISTORY:            



                                            , fall/head / neck            



                                            pain/pain pain/hip            



                                            pain/ knee pain,            



                                            COMPARISON: None            



                                            available TECHNIQUE:            



                                            Axial images of the            



                                            thoracic and lumbar            



                                            spine were obtained            



                                            without contrast.            



                                            Images were            



                                            reformatted to create            



                                            coronal and sagittal            



                                            reconstructions . One            



                                            or more of the            



                                            following dose            



                                            reduction techniques            



                                            were used: Automated            



                                            exposure control,            



                                            adjustment of the mA            



                                            and/or kV according to            



                                            patient size, and/or            



                                            utilization of            



                                            iterative             



                                            reconstruction            



                                            technique. DLP: 511            



                                            mGy-cm. FINDINGS:            



                                            Thoracic spine There            



                                            is diffuse osteopenia.            



                                            There is exaggeration            



                                            of normal thoracic            



                                            kyphosis. There is no            



                                            acute fracture or            



                                            dislocation. Vertebral            



                                            body heights are still            



                                            maintained. There is            



                                            bridging anterior            



                                            aspect stenosis            



                                            throughout the mid            



                                            thoracic spine as well            



                                            as partial acquired            



                                            fusion of the disc            



                                            spaces from T6-T7            



                                            through T10-T11. There            



                                            is atrophy of            



                                            paraspinal muscles.            



                                            Emphysematous changes            



                                            are noted in the            



                                            lungs. On axial            



                                            images, there is no            



                                            significant central            



                                            canal stenosis. There            



                                            is hypertrophy of            



                                            facet joints            



                                            contributing to            



                                            foraminal narrowing            



                                            particularly from            



                                            T6-T7 through T11-T12.            



                                            FINDINGS: Lumbar spine            



                                            There is diffuse            



                                            osteopenia. No            



                                            displaced fracture or            



                                            dislocation is seen.            



                                            Vertebral body heights            



                                            are maintained. Vacuum            



                                            disc phenomena are            



                                            present from L2-L3            



                                            through L5-S1 with            



                                            associated disc space            



                                            narrowing.  There is            



                                            sclerosis between            



                                            spinous processes from            



                                            L2 through S1 likely            



                                            representing            



                                            Baastrup's pathology.            



                                            There is atrophy of            



                                            paraspinal muscles.            



                                            Diverticula are            



                                            present within the            



                                            sigmoid colon.            



                                            Visualized            



                                            intra-abdominal            



                                            contents are otherwise            



                                            within normal limits.            



                                            On axial images, there            



                                            is both spondylosis            



                                            and facet arthrosis            



                                            contributing to            



                                            central canal stenosis            



                                            at the L2-L3 through            



                                            L5-S1 levels, most            



                                            pronounced at L3-L4            



                                            and L4-L5. There is            



                                            also foraminal            



                                            narrowing from T12-L1            



                                            through L5-S1.            



                                            IMPRESSION: PAGE 1            



                                            Signed Report            



                                            (CONTINUED) FAX:            



                                            Lashell Boswell MD            



                                            [phone] [place]:            



                                             St: PRE FAX:            



                                            James Rueda DO            



                                            ----------------------            



                                            ----------------------            



                                            ----------------------            



                                            ------------- Name:            



                                            LY FRANCIS            



                                            [place] :            



                                            1935 Age/S: 86/F            



                                            [address]:            



                                            [id number] Loc: [place] [address]            



                                            Phys: James Rueda DO R2 Acct:            



                                            [id number] Dis Date:            



                                            Status: PRE ER PHONE            



                                            #: [phone] Exam            



                                            Date: 2021 1435            



                                            FAX #: [phone]            



                                            Reason: fall/head            



                                            stiek/ neck pain/pain            



                                            pain/hip pain/ EXAMS:            



                                            CPT CODE: [id number] CT            



                                            L-SPINE W/O CONTRAST            



                                            59172 <Continued> 1.            



                                            Diffuse osteopenia. No            



                                            displaced fracture or            



                                            dislocation. 2.            



                                            Exaggeration of normal            



                                            thoracic kyphosis.            



                                            Vertebral body heights            



                                            are maintained. 3.            



                                            Spondylosis and facet            



                                            arthrosis contribute            



                                            to canal stenosis from            



                                            L2-L3 through L5-S1.            



                                            Foraminal narrowing is            



                                            present from T6-T7            



                                            through L5-S1. **            



                                            Electronically Signed            



                                            by Mejia Herrera MD on            



                                            2021 at 1453 **            



                                            Reported and signed            



                                            by: Mejia Herrera MD CC:            



                                            Lashell Boswell MD; James Rueda DO            



                                            Technologist: Katie Pelaez Trnscrd            



                                            Dt/Tm: 2021            



                                            (2461) [email or web address] Orig            



                                            Print D/T: S:            



                                            2021 (5956 PAGE            



                                            2 Signed Report            

 

           - CT T-SPINE W/O 2021            **********************        

    



           CONTRAST   14:53:00              **********************            



                                            [facility] : 1935            



                                            Sex:                  



                                            F*********************            



                                            **********************            



                                            ***************** FAX:            



                                            Lashell Boswell MD            



                                            [phone] [place]:            



                                             St: DEP FAX:            



                                            James Rueda DO            



                                            ----------------------            



                                            ----------------------            



                                            ----------------------            



                                            ------------- Name:            



                                            LY FRANCIS [facility] :            



                                            1935 Age/S: 86/F            



                                            [address]:            



                                            [id number] Loc: [place]            



                                            [place] [address]            



                                            Phys: James Rueda DO  Acct:            



                                            [id number] Dis Date:            



                                            Status: DEP ER PHONE            



                                            #: [phone] Exam            



                                            Date: 2021 1435            



                                            FAX #: [phone]            



                                            Reason: fall/head            



                                            stiek/ neck pain/pain            



                                            pain/hip pain/ EXAMS:            



                                            CPT CODE: [id number] CT            



                                            T-SPINE W/O CONTRAST            



                                            43573 Exam: CT of the            



                                            thoracic and lumbar            



                                            spine without contrast            



                                            Location: [facility] HISTORY:            



                                            , fall/head / neck            



                                            pain/pain pain/hip            



                                            pain/ knee pain,            



                                            COMPARISON: None            



                                            available TECHNIQUE:            



                                            Axial images of the            



                                            thoracic and lumbar            



                                            spine were obtained            



                                            without contrast.            



                                            Images were            



                                            reformatted to create            



                                            coronal and sagittal            



                                            reconstructions . One            



                                            or more of the            



                                            following dose            



                                            reduction techniques            



                                            were used: Automated            



                                            exposure control,            



                                            adjustment of the mA            



                                            and/or kV according to            



                                            patient size, and/or            



                                            utilization of            



                                            iterative             



                                            reconstruction            



                                            technique. DLP: 511            



                                            mGy-cm. FINDINGS:            



                                            Thoracic spine There            



                                            is diffuse osteopenia.            



                                            There is exaggeration            



                                            of normal thoracic            



                                            kyphosis. There is no            



                                            acute fracture or            



                                            dislocation. Vertebral            



                                            body heights are still            



                                            maintained. There is            



                                            bridging anterior            



                                            aspect stenosis            



                                            throughout the mid            



                                            thoracic spine as well            



                                            as partial acquired            



                                            fusion of the disc            



                                            spaces from T6-T7            



                                            through T10-T11. There            



                                            is atrophy of            



                                            paraspinal muscles.            



                                            Emphysematous changes            



                                            are noted in the            



                                            lungs. On axial            



                                            images, there is no            



                                            significant central            



                                            canal stenosis. There            



                                            is hypertrophy of            



                                            facet joints            



                                            contributing to            



                                            foraminal narrowing            



                                            particularly from            



                                            T6-T7 through T11-T12.            



                                            FINDINGS: Lumbar spine            



                                            There is diffuse            



                                            osteopenia. No            



                                            displaced fracture or            



                                            dislocation is seen.            



                                            Vertebral body heights            



                                            are maintained. Vacuum            



                                            disc phenomena are            



                                            present from L2-L3            



                                            through L5-S1 with            



                                            associated disc space            



                                            narrowing. There is            



                                            sclerosis between            



                                            spinous processes from            



                                            L2 through S1 likely            



                                            representing            



                                            Baastrup's pathology.            



                                            There is atrophy of            



                                            paraspinal muscles.            



                                            Diverticula are            



                                            present within the            



                                            sigmoid colon.            



                                            Visualized            



                                            intra-abdominal            



                                            contents are otherwise            



                                            within normal limits.            



                                            On axial images, there            



                                            is both spondylosis            



                                            and facet arthrosis            



                                            contributing to            



                                            central canal stenosis            



                                            at the L2-L3 through            



                                            L5-S1 levels, most            



                                            pronounced at L3-L4            



                                            and L4-L5. There is            



                                            also foraminal            



                                            narrowing from T12-L1            



                                            through L5-S1.            



                                            IMPRESSION: PAGE 1            



                                            Signed Report            



                                            (CONTINUED) FAX:            



                                            Lashell Boswell MD            



                                            [phone] [place]:            



                                             St: [facility] FAX:            



                                            James Rueda DO            



                                            ----------------------            



                                            ----------------------            



                                            ----------------------            



                                            ------------- Name:            



                                            LY FRANCIS :            



                                            1935 Age/S: 86/F            



                                            [address]:            



                                            [id number] Loc: [place] [address]            



                                            Phys: RowlandsJames DO R2 Acct:            



                                            [id number] Dis Date:            



                                            Status: DEP ER PHONE            



                                            #: [phone]  Exam            



                                            Date: 2021 1435            



                                            FAX #: [phone]            



                                            Reason: fall/head            



                                            stiek/ neck pain/pain            



                                            pain/hip pain/ EXAMS:            



                                            CPT CODE: [id number] CT            



                                            T-SPINE W/O CONTRAST            



                                            11128 (Continued) 1.            



                                            Diffuse osteopenia. No            



                                            displaced fracture or            



                                            dislocation. 2.            



                                            Exaggeration of normal            



                                            thoracic kyphosis.            



                                            Vertebral body heights            



                                            are maintained. 3.            



                                            Spondylosis and facet            



                                            arthrosis contribute            



                                            to canal stenosis from            



                                            L2-L3 through L5-S1.            



                                            Foraminal narrowing is            



                                            present from T6-T7            



                                            through L5-S1. **            



                                            Electronically Signed            



                                            by Mejia Herrera MD on            



                                            2021 at 2055 **            



                                            Reported and signed            



                                            by: Mejia Herrera MD  CC:            



                                            Lashell Boswell MD; James Rueda DO            



                                            Technologist: Katie Pelaez Trnscrd            



                                            Dt/Tm: 2021            



                                            (6246) [email or web address] Orig            



                                            Print D/T: S:            



                                            2021 (4392 PAGE            



                                            2 Signed Report            

 

           - CT L-SPINE W/O 2021            **********************        

    



           CONTRAST   14:53:00              **********************            



                                            ****************[facility]            



                                            [place]: LY FRANCIS : 1935            



                                            Sex:                  



                                            F*********************            



                                            **********************            



                                            ***************** FAX:            



                                            Lashell Boswell MD            



                                            [phone] [place]:            



                                             St: [facility] FAX:            



                                            James Rueda DO            



                                            ----------------------            



                                            ----------------------            



                                            ----------------------            



                                            ------------- Name:            



                                            LY FRANCIS :            



                                            1935 Age/S: 86/F            



                                            [id number]:            



                                            [id number] Loc: [place] [address]            



                                            Phys: James Rueda            



                                            DO R2 Acct:            



                                            [id number] Dis Date:            



                                            Status: DEP ER PHONE            



                                            #: [phone] Exam            



                                            Date: 2021 1435            



                                            FAX #: [phone]            



                                            Reason: fall/head            



                                            stiek/ neck pain/pain            



                                            pain/hip pain/ EXAMS:            



                                            CPT CODE: [id number] CT            



                                            L-SPINE W/O CONTRAST            



                                            50147 Exam: CT of the            



                                            thoracic and lumbar            



                                            spine without contrast            



                                            Location: [facility] HISTORY:            



                                            , fall/head / neck            



                                            pain/pain pain/hip            



                                            pain/ knee pain,            



                                            COMPARISON: None            



                                            available TECHNIQUE:            



                                            Axial images of the            



                                            thoracic and lumbar            



                                            spine were obtained            



                                            without contrast.            



                                            Images were            



                                            reformatted to create            



                                            coronal and sagittal            



                                            reconstructions . One            



                                            or more of the            



                                            following dose            



                                            reduction techniques            



                                            were used: Automated            



                                            exposure control,            



                                            adjustment of the mA            



                                            and/or kV according to            



                                            patient size, and/or            



                                            utilization of            



                                            iterative             



                                            reconstruction            



                                            technique. DLP: 511            



                                            mGy-cm. FINDINGS:            



                                            Thoracic spine There            



                                            is diffuse osteopenia.            



                                            There is exaggeration            



                                            of normal thoracic            



                                            kyphosis. There is no            



                                            acute fracture or            



                                            dislocation. Vertebral            



                                            body heights are still            



                                            maintained. There is            



                                            bridging anterior            



                                            aspect stenosis            



                                            throughout the mid            



                                            thoracic spine as well            



                                            as partial acquired            



                                            fusion of the disc            



                                            spaces from T6-T7            



                                            through T10-T11. There            



                                            is atrophy of            



                                            paraspinal muscles.            



                                            Emphysematous changes            



                                            are noted in the            



                                            lungs. On axial            



                                            images, there is no            



                                            significant central            



                                            canal stenosis. There            



                                            is hypertrophy of            



                                            facet joints            



                                            contributing to            



                                            foraminal narrowing            



                                            particularly from            



                                            T6-T7 through T11-T12.            



                                            FINDINGS: Lumbar spine            



                                            There is diffuse            



                                            osteopenia. No            



                                            displaced fracture or            



                                            dislocation is seen.            



                                            Vertebral body heights            



                                            are maintained. Vacuum            



                                            disc phenomena are            



                                            present from L2-L3            



                                            through L5-S1 with            



                                            associated disc space            



                                            narrowing. There is            



                                            sclerosis between            



                                            spinous processes from            



                                            L2 through S1 likely            



                                            representing            



                                            Baastrup's pathology.            



                                            There is atrophy of            



                                            paraspinal muscles.            



                                            Diverticula are            



                                            present within the            



                                            sigmoid colon.            



                                            Visualized            



                                            intra-abdominal            



                                            contents are otherwise            



                                            within normal limits.            



                                            On axial images, there            



                                            is both spondylosis            



                                            and facet arthrosis            



                                            contributing to            



                                            central canal stenosis            



                                            at the L2-L3 through            



                                            L5-S1 levels, most            



                                            pronounced at L3-L4            



                                            and L4-L5. There is            



                                            also foraminal            



                                            narrowing from T12-L1            



                                            through L5-S1.            



                                            IMPRESSION: PAGE  1            



                                            Signed Report            



                                            (CONTINUED) FAX:            



                                            Lashell Boswell MD            



                                            [phone] [place]:            



                                             St: [facility]P FAX:            



                                            James Rueda DO            



                                            ----------------------            



                                            ----------------------            



                                            ----------------------            



                                            ------------- Name:            



                                            LY FRANCIS :            



                                            1935 Age/S: 86/F            



                                            [id number] Unit:            



                                            [id number] Loc: [place] [address]            



                                            Phys: Jamse Rueda DO R2 Acct:            



                                            [id number] Dis Date:            



                                            Status: DEP ER PHONE            



                                            #: [phone] Exam            



                                            Date: 2021 1435            



                                            FAX #: [phone]            



                                            Reason: fall/head            



                                            stiek/ neck pain/pain            



                                            pain/hip pain/ EXAMS:            



                                            CPT CODE: [id number] CT            



                                            L-SPINE W/O CONTRAST            



                                            89235 (Continued)  1.            



                                            Diffuse osteopenia. No            



                                            displaced fracture or            



                                            dislocation. 2.            



                                            Exaggeration of normal            



                                            thoracic kyphosis.            



                                            Vertebral body heights            



                                            are maintained. 3.            



                                            Spondylosis and facet            



                                            arthrosis contribute            



                                            to canal stenosis from            



                                            L2-L3 through L5-S1.            



                                            Foraminal narrowing is            



                                            present from T6-T7            



                                            through L5-S1. **            



                                            Electronically Signed            



                                            by Mejia Herrera MD on            



                                            2021 at 4205 **            



                                            Reported and signed            



                                            by: Mejia Herrera MD CC:            



                                            Lashell Boswell MD; James Rueda DO            



                                            Technologist: Katie Pelaez Trnscrd            



                                            Dt/Tm: 2021            



                                            (5403) [email or web address]  Orig            



                                            Print D/T: S:            



                                            2021 (2191 PAGE            



                                            2 Signed Report            

 

           - CT C-SPINE W/O 2021            **********************        

    



           CONT       14:46:00              **********************            



                                            [facility] 1935            



                                            Sex:                  



                                            F*********************            



                                            **********************            



                                            ***************** FAX:            



                                            Lashell Boswell MD            



                                            [phone] [place]:            



                                             St: PRE FAX:            



                                            James Rueda DO            



                                            ----------------------            



                                            ----------------------            



                                            ----------------------            



                                            ------------- Name:            



                                            LY FRANCIS [facility]  :            



                                            1935 Age/S: 86/F            



                                            [address]:            



                                            [id number] Loc: [place]            



                                            [place] [address]            



                                            Phys: James Rueda            



                                             R2  Acct:            



                                            [id number] Dis Date:            



                                            Status: PRE ER PHONE            



                                            #: [phone] Exam            



                                            Date: 2021 1435            



                                            FAX #: [phone]            



                                            Reason: fall/head            



                                            stiek/ neck pain/pain            



                                            pain/hip pain/ EXAMS:            



                                            CPT CODE: [id number] CT            



                                            C-SPINE W/O CONT            



                                            [id number] EXAM: 1. CT Head            



                                            without contrast 2. CT            



                                            Cervical spine without            



                                            contrast Location: H24            



                                            HISTORY: Fall, head            



                                            and neck pain            



                                            COMPARISON: None            



                                            available. TECHNIQUE:            



                                            Multiple transaxial            



                                            images of the head and            



                                            cervical spine were            



                                            obtained without            



                                            intravenous contrast.            



                                            Images were            



                                            reformatted to create            



                                            coronal and sagittal            



                                            reconstructions. One            



                                            or more of the            



                                            following dose            



                                            reduction techniques            



                                            were used: Automated            



                                            exposure control,            



                                            adjustment of the mA            



                                            and/or kV according to            



                                            patient size, and/or            



                                            utilization of            



                                            iterative             



                                            reconstruction            



                                            technique. DLP: 1443            



                                            mGy-cm. FINDINGS: Head            



                                            There is no acute            



                                            intracranial            



                                            hemorrhage. There is            



                                            no mass, mass effect,            



                                            midline shift or            



                                            extra-axial fluid            



                                            collection. Brain            



                                            parenchymal volume is            



                                            diffusely decreased            



                                            which is within normal            



                                            limits given patient's            



                                            age. Enlargement of            



                                            the right lateral            



                                            ventricles            



                                            commensurate with            



                                            degree of central            



                                            atrophy. Gray-white            



                                            differentiation is            



                                            maintained. There is            



                                            no evidence for acute            



                                            major vessel infarct.            



                                            There are areas of            



                                            decreased attenuation            



                                            within subcortical and            



                                            deep white matter            



                                            which are nonspecific.            



                                            Paranasal sinuses,            



                                            mastoid air cells and            



                                            visualized orbital            



                                            contents are within            



                                            normal limits. Bones            



                                            and calvaria and skull            



                                            base are intact. Note            



                                            is made of bilateral            



                                            lens replacements.            



                                            Intraorbital contents            



                                            are within normal            



                                            limits. FINDINGS:            



                                            Cervical Spine            



                                            Postoperative changes            



                                            include decompression            



                                            laminectomies from C3            



                                            PAGE 1  Signed Report            



                                            (CONTINUED) FAX:            



                                            Lashell Boswell MD            



                                            [phone] [place]:            



                                             St: PRE FAX:            



                                            James Rueda DO            



                                            ----------------------            



                                            ----------------------            



                                            ----------------------            



                                            ------------- Name:            



                                            LY FRANCIS :            



                                            1935 Age/S: 86/F            



                                            [address]  [address]:            



                                            [id number] Loc: [place]            



                                            [place] [address]            



                                            Phys: James Rueda            



                                            DO R2 Acct:            



                                            [id number] Dis Date:            



                                             Status: PRE ER PHONE            



                                            #: [phone] Exam            



                                            Date: 2021 1435            



                                            FAX #: [phone]            



                                            Reason: fall/head            



                                            stiek/ neck pain/pain            



                                            pain/hip pain/ EXAMS:            



                                            CPT CODE: [id number] CT            



                                            C-SPINE W/O CONT [id number]            



                                            <Continued> through            



                                            C6. There is acquired,            



                                            osseous fusion of the            



                                            facet joints. There is            



                                            no acute fracture or            



                                            dislocation. There is            



                                            diffuse osteopenia.            



                                            Vertebral body heights            



                                            are maintained. There            



                                            is fusion of the disc            



                                            spaces from C3-C4            



                                            through C5-C6. There            



                                            is mild atrophy of            



                                            paraspinal muscles.            



                                            Nodules are present            



                                            within the thyroid            



                                            gland. Largest on the            



                                            right measures up to            



                                            1.1 cm. Consider            



                                            further               



                                            characterization with            



                                            thyroid ultrasound.            



                                            Occiput-C1: Intact.            



                                            C1-C2: Narrowing of            



                                            the predental space            



                                            and subchondral            



                                            sclerosis.            



                                            Atlantoaxial joint is            



                                            maintained. Mild            



                                            central canal            



                                            stenosis. Hypertrophy            



                                            of the right lateral            



                                            mass contributing to            



                                            moderate right            



                                            foraminal narrowing.            



                                            C2-C3: Disc osteophyte            



                                            complex measuring up            



                                            to 3 mm. Fusion of the            



                                            fused facet joints.            



                                            Mild foraminal            



                                            narrowing. Mild            



                                            central canal            



                                            stenosis. C3-C4            



                                            through C5-C6:            



                                            Decompression            



                                            laminectomies are            



                                            noted. There is            



                                            persistent spondylotic            



                                            ridging and            



                                            hypertrophy of the            



                                            fused facet joints            



                                            contributing to            



                                            moderate foraminal            



                                            narrowing. C6-C7:            



                                            Partial decompression.            



                                            Moderate to severe            



                                            bilateral facet            



                                            arthropathy and            



                                            foraminal narrowing.            



                                            No canal stenosis.            



                                            C7-T1: Mild bilateral            



                                            facet arthropathy and            



                                            foraminal narrowing.            



                                            No  central canal            



                                            stenosis. Pleural and            



                                            parenchymal scarring            



                                            is seen at bilateral            



                                            lung apices.            



                                            IMPRESSION: 1. No            



                                            acute intracranial            



                                            abnormality. No acute            



                                            hemorrhage, mass            



                                            lesion or infarct. 2.            



                                            Senescent changes            



                                            including brain            



                                            parenchymal volume            



                                            loss and chronic small            



                                            vessel ischemic white            



                                            matter disease. 3. No            



                                            acute fracture or            



                                            dislocation. 4.            



                                            Decompression            



                                            laminectomies from C3            



                                            through C6 with            



                                            acquired osseous            



                                            fusion of facet            



                                            joints. Persistent            



                                            spondylosis and facet            



                                            arthrosis contribute            



                                            to foraminal            



                                            narrowing. 5. Nodules            



                                            within the thyroid            



                                            gland. Largest            



                                            measures up to 1.1 cm.            



                                            PAGE 2 Signed Report            



                                            (CONTINUED) FAX:            



                                            Lashell Boswell MD            



                                            [phone] [place]:            



                                             St: PRE FAX:            



                                            James Rueda DO            



                                            ----------------------            



                                            ----------------------            



                                            ----------------------            



                                            ------------- Name:            



                                            [facility]            



                                            1935 Age/S: 86/F            



                                            [address]:            



                                            [id number] Loc: [place]            



                                            [place] [address]            



                                            Phys: James Rueda DO R2 Acct:            



                                            [id number] Dis Date:            



                                            Status: PRE ER PHONE            



                                            #: [phone] Exam            



                                            Date: 2021 1435            



                                            FAX #: [phone]            



                                            Reason: fall/head            



                                            stiek/ neck pain/pain            



                                            pain/hip pain/ EXAMS:            



                                            CPT CODE: [id number] CT            



                                            C-SPINE W/O CONT 86245            



                                            <Continued>            



                                            Correlation with prior            



                                            ultrasound if            



                                            available. Consider            



                                            further               



                                            characterization with            



                                            ultrasound on            



                                            nonemergent basis if            



                                            not. ** Electronically            



                                            Signed by Mejia Herrera MD            



                                            on 2021 at 1441            



                                            ** Reported and signed            



                                            by: Mejia Herrera MD  CC:            



                                            Lashell Boswell MD; James Rueda DO            



                                            Technologist: Katie Pelaez Trnscrd            



                                            Dt/Tm: 2021            



                                            (9524) [email or web address] Orig            



                                            Print D/T: S:            



                                            2021 (8219  PAGE            



                                            3 Signed Report            

 

           - CT HEAD/BRAIN 2021            **********************         

   



           W/O CONT   14:46:00              **********************            



                                            [facility] : 1935            



                                            Sex:                  



                                            F*********************            



                                            **********************            



                                            ***************** FAX:            



                                            Lashell Boswell MD            



                                            [phone] [place]:            



                                             St: PRE FAX:            



                                            James Rueda DO            



                                            ----------------------            



                                            ----------------------            



                                            ----------------------            



                                            ------------- Name:            



                                            LY FRANCIS [facility] :            



                                            1935 Age/S: 86/F            



                                            [address]:            



                                            [id number] Loc: [place]            



                                            [place] [address]            



                                            Phys: James Rueda Acct:            



                                            [id number] Dis Date:            



                                            Status: PRE ER PHONE            



                                            #: [phone] Exam            



                                            Date: 2021 1435            



                                            FAX #: [phone]            



                                            Reason: fall/head            



                                            stiek/ neck pain/pain            



                                            pain/hip pain/  EXAMS:            



                                            CPT CODE: [id number] CT            



                                            HEAD/BRAIN W/O CONT            



                                            [id number] EXAM: 1. CT Head            



                                            without contrast  2.            



                                            CT Cervical spine            



                                            without contrast            



                                            Location: [facility] HISTORY:            



                                            Fall, head and neck            



                                            pain COMPARISON: None            



                                            available. TECHNIQUE:            



                                            Multiple transaxial            



                                            images of the head and            



                                            cervical spine were            



                                            obtained without            



                                            intravenous contrast.            



                                            Images were            



                                            reformatted to create            



                                            coronal and sagittal            



                                            reconstructions. One            



                                            or more of the            



                                            following dose            



                                            reduction techniques            



                                            were used: Automated            



                                            exposure control,            



                                            adjustment of the mA            



                                            and/or kV according to            



                                            patient size, and/or            



                                            utilization of            



                                            iterative             



                                            reconstruction            



                                            technique. DLP: 1443            



                                            mGy-cm. FINDINGS: Head            



                                            There is no acute            



                                            intracranial            



                                            hemorrhage. There is            



                                            no mass, mass effect,            



                                            midline shift or            



                                            extra-axial fluid            



                                            collection. Brain            



                                            parenchymal volume is            



                                            diffusely decreased            



                                            which is within normal            



                                            limits given patient's            



                                            age. Enlargement of            



                                            the right lateral            



                                            ventricles            



                                            commensurate with            



                                            degree of central            



                                            atrophy. Gray-white            



                                            differentiation is            



                                            maintained. There is            



                                            no evidence for acute            



                                            major vessel infarct.            



                                            There are areas of            



                                            decreased attenuation            



                                            within subcortical and            



                                            deep white matter            



                                            which are nonspecific.            



                                            Paranasal sinuses,            



                                            mastoid air cells and            



                                            visualized orbital            



                                            contents are within            



                                            normal limits. Bones            



                                            and calvaria and skull            



                                            base are intact. Note            



                                            is made of bilateral            



                                            lens replacements.            



                                            Intraorbital contents            



                                            are within normal            



                                            limits. FINDINGS:            



                                            Cervical Spine            



                                            Postoperative changes            



                                            include decompression            



                                            laminectomies from C3            



                                            PAGE 1 Signed Report            



                                            (CONTINUED) FAX:            



                                            Lashell Boswell MD            



                                            [phone] [place]:            



                                             St: PRE FAX:            



                                            James Rueda DO            



                                            ----------------------            



                                            ----------------------            



                                            ----------------------            



                                            ------------- Name:            



                                            LY FRANCIS :            



                                            1935 Age/S: 86/F            



                                            [address] Unit:            



                                            [id number] Loc: [place]            



                                            [place] [address]            



                                            Phys: James Rueda            



                                            DO R2 Acct:            



                                            [id number] Dis Date:            



                                            Status: PRE ER PHONE            



                                            #: [phone]  Exam            



                                            Date: 2021 1435            



                                            FAX #: [phone]            



                                            Reason: fall/head            



                                            stiek/ neck pain/pain            



                                            pain/hip pain/ EXAMS:            



                                            CPT CODE: [id number] CT            



                                            HEAD/BRAIN W/O CONT            



                                            42600 <Continued>            



                                            through C6. There is            



                                            acquired, osseous            



                                            fusion of the facet            



                                            joints. There is no            



                                            acute fracture or            



                                            dislocation. There is            



                                            diffuse osteopenia.            



                                            Vertebral body heights            



                                            are maintained. There            



                                            is fusion of the disc            



                                            spaces from C3-C4            



                                            through C5-C6. There            



                                            is mild atrophy of            



                                            paraspinal muscles.            



                                            Nodules are present            



                                            within the thyroid            



                                            gland. Largest on the            



                                            right measures up to            



                                            1.1 cm. Consider            



                                            further               



                                            characterization with            



                                            thyroid ultrasound.            



                                            Occiput-C1: Intact.            



                                            C1-C2: Narrowing of            



                                            the predental space            



                                            and subchondral            



                                            sclerosis.            



                                            Atlantoaxial joint is            



                                            maintained. Mild            



                                            central canal            



                                            stenosis. Hypertrophy            



                                            of the right lateral            



                                            mass contributing to            



                                            moderate right            



                                            foraminal narrowing.            



                                            C2-C3: Disc osteophyte            



                                            complex measuring up            



                                            to 3 mm. Fusion of the            



                                            fused facet joints.            



                                            Mild foraminal            



                                            narrowing. Mild            



                                            central canal            



                                            stenosis. C3-C4            



                                            through C5-C6:            



                                            Decompression            



                                            laminectomies are            



                                            noted. There is            



                                            persistent spondylotic            



                                            ridging and            



                                            hypertrophy of the            



                                            fused facet joints            



                                            contributing to            



                                            moderate foraminal            



                                            narrowing. C6-C7:            



                                            Partial decompression.            



                                            Moderate to severe            



                                            bilateral facet            



                                            arthropathy and            



                                            foraminal narrowing.            



                                            No canal stenosis.            



                                            C7-T1: Mild bilateral            



                                            facet arthropathy and            



                                            foraminal narrowing.            



                                            No central canal            



                                            stenosis. Pleural and            



                                            parenchymal scarring            



                                            is seen at bilateral            



                                            lung apices.            



                                            IMPRESSION: 1. No            



                                            acute intracranial            



                                            abnormality. No acute            



                                            hemorrhage, mass            



                                            lesion or infarct. 2.            



                                            Senescent changes            



                                            including brain            



                                            parenchymal volume            



                                            loss and chronic small            



                                            vessel ischemic white            



                                            matter disease. 3. No            



                                            acute fracture or            



                                            dislocation. 4.            



                                            Decompression            



                                            laminectomies from C3            



                                            through C6 with            



                                            acquired osseous            



                                            fusion of facet            



                                            joints. Persistent            



                                            spondylosis and facet            



                                            arthrosis contribute            



                                            to foraminal            



                                            narrowing. 5. Nodules            



                                            within the thyroid            



                                            gland. Largest            



                                            measures up to 1.1 cm.            



                                            PAGE 2 Signed Report            



                                            (CONTINUED) FAX:            



                                            Lashell Boswell MD            



                                            [phone] [place]:            



                                             St: PRE FAX:            



                                            James Rueda DO            



                                            ----------------------            



                                            ----------------------            



                                            ----------------------            



                                            ------------- Name:            



                                            LY FRANCIS [facility]            



                                            [place] :            



                                            1935 Age/S: 86/F            



                                            [id number] Unit:            



                                            [id number] Loc: [place] [address]            



                                            Phys: James Rueda DO [place]  Acct:            



                                            [id number] Dis Date:            



                                            Status: PRE ER PHONE            



                                            #: [phone] Exam            



                                            Date: 2021 1435            



                                            FAX #: [phone]            



                                            Reason: fall/head            



                                            stiek/ neck pain/pain            



                                            pain/hip pain/ EXAMS:            



                                            CPT CODE: [id number] CT            



                                            HEAD/BRAIN W/O CONT            



                                            65389  <Continued>            



                                            Correlation with prior            



                                            ultrasound if            



                                            available. Consider            



                                            further               



                                            characterization with            



                                            ultrasound on            



                                            nonemergent basis if            



                                            not. ** Electronically            



                                            Signed by Mejia Herrera MD            



                                            on 2021 at 1443            



                                            ** Reported and signed            



                                            by: Mejia Herrera MD CC:            



                                            aLshell Boswell MD; James Rueda DO            



                                            Technologist: Katie Pelaez Trnscrd            



                                            Dt/Tm: 2021            



                                            (1446) [email or web address] Orig            



                                            Print D/T: S:            



                                            2021 (4429 PAGE            



                                            3 Signed Report            

 

           - CT HEAD/BRAIN 2021            **********************         

   



           W/O CONT   14:46:00              **********************            



                                            [facility] : 1935            



                                            Sex:                  



                                            F*********************            



                                            **********************            



                                            ***************** FAX:            



                                            Lashell Boswell MD            



                                            [phone] [place]:            



                                             St: [facility] FAX:            



                                            James Rueda DO            



                                            ----------------------            



                                            ----------------------            



                                            ----------------------            



                                            ------------- Name:            



                                            LY FRANCIS :            



                                            1935 Age/S: 86/F            



                                            [address] Unit:            



                                            [id number] Loc: [facility]            



                                            [place] [address]            



                                            Phys: James Rueda            



                                            DO R2 Acct:            



                                            [id number] Dis Date:            



                                            Status: DEP ER PHONE            



                                            #: [phone] Exam            



                                            Date: 2021 1435            



                                            FAX #: [phone]            



                                            Reason: fall/head            



                                            stiek/ neck pain/pain            



                                            pain/hip pain/ EXAMS:            



                                            CPT CODE: [id number] CT            



                                            HEAD/BRAIN W/O CONT            



                                            48633 EXAM: 1. CT Head            



                                            without contrast 2. CT            



                                            Cervical spine without            



                                            contrast Location: [facility]            



                                             HISTORY: Fall, head            



                                            and neck pain            



                                            COMPARISON: None            



                                            available. TECHNIQUE:            



                                            Multiple transaxial            



                                            images of the head and            



                                            cervical spine were            



                                            obtained without            



                                            intravenous contrast.            



                                            Images were            



                                            reformatted to create            



                                            coronal and sagittal            



                                            reconstructions. One            



                                            or more of the            



                                            following dose            



                                            reduction techniques            



                                            were used: Automated            



                                            exposure control,            



                                            adjustment of the mA            



                                            and/or kV according to            



                                            patient size, and/or            



                                            utilization of            



                                            iterative             



                                            reconstruction            



                                            technique. DLP: 1443            



                                            mGy-cm. FINDINGS: Head            



                                             There is no acute            



                                            intracranial            



                                            hemorrhage. There is            



                                            no mass, mass effect,            



                                            midline shift or            



                                            extra-axial fluid            



                                            collection. Brain            



                                            parenchymal volume is            



                                            diffusely decreased            



                                            which is within normal            



                                            limits given patient's            



                                            age. Enlargement of            



                                            the right lateral            



                                            ventricles            



                                            commensurate with            



                                            degree of central            



                                            atrophy. Gray-white            



                                            differentiation is            



                                            maintained. There is            



                                            no evidence for acute            



                                            major vessel infarct.            



                                            There are areas of            



                                            decreased attenuation            



                                            within subcortical and            



                                            deep white matter            



                                            which are nonspecific.            



                                            Paranasal sinuses,            



                                            mastoid air cells and            



                                            visualized orbital            



                                            contents are within            



                                            normal limits. Bones            



                                            and calvaria and skull            



                                            base are intact. Note            



                                            is made of  bilateral            



                                            lens replacements.            



                                            Intraorbital contents            



                                            are within normal            



                                            limits. FINDINGS:            



                                            Cervical Spine            



                                            Postoperative changes            



                                            include decompression            



                                            laminectomies from C3            



                                            PAGE 1 Signed Report            



                                            (CONTINUED) FAX:            



                                            Lashell Boswell MD            



                                            [phone] [place]:            



                                             St: [facility] FAX:            



                                            James Rueda DO            



                                            ----------------------            



                                            ----------------------            



                                            ----------------------            



                                            ------------- Name:            



                                            LY FRANCIS :            



                                            1935 Age/S: 86/F            



                                            [id number] Unit:            



                                            [id number] Loc: [place]            



                                            [place] [address]            



                                            Phys: James Rueda            



                                            DO R2  Acct:            



                                            [id number] Dis Date:            



                                            Status: DEP ER PHONE            



                                            #: [phone] Exam            



                                            Date: 2021 9099            



                                            FAX #: [phone]            



                                            Reason: fall/head            



                                            stiek/ neck pain/pain            



                                            pain/hip pain/ EXAMS:            



                                            CPT CODE: [id number] CT            



                                            HEAD/BRAIN W/O CONT            



                                            80227  (Continued)            



                                            through C6. There is            



                                            acquired, osseous            



                                            fusion of the facet            



                                            joints. There is no            



                                            acute fracture or            



                                            dislocation. There is            



                                            diffuse osteopenia.            



                                            Vertebral body heights            



                                            are maintained. There            



                                            is fusion of the disc            



                                            spaces from C3-C4            



                                            through C5-C6. There            



                                            is mild atrophy of            



                                            paraspinal muscles.            



                                            Nodules are present            



                                            within the thyroid            



                                            gland. Largest on the            



                                            right measures up to            



                                            1.1 cm. Consider            



                                            further               



                                            characterization with            



                                            thyroid ultrasound.            



                                            Occiput-C1: Intact.            



                                            C1-C2: Narrowing of            



                                            the predental space            



                                            and subchondral            



                                            sclerosis.            



                                            Atlantoaxial joint is            



                                            maintained. Mild            



                                            central canal            



                                            stenosis. Hypertrophy            



                                            of the right lateral            



                                            mass contributing to            



                                            moderate right            



                                            foraminal narrowing.            



                                            C2-C3: Disc osteophyte            



                                            complex measuring up            



                                            to 3 mm. Fusion of the            



                                            fused facet joints.            



                                            Mild foraminal            



                                            narrowing. Mild            



                                            central canal            



                                            stenosis. C3-C4            



                                            through C5-C6:            



                                            Decompression            



                                            laminectomies are            



                                            noted. There is            



                                            persistent spondylotic            



                                            ridging and            



                                            hypertrophy of the            



                                            fused facet joints            



                                            contributing to            



                                            moderate foraminal            



                                            narrowing.  C6-C7:            



                                            Partial decompression.            



                                            Moderate to severe            



                                            bilateral facet            



                                            arthropathy and            



                                            foraminal narrowing.            



                                            No canal stenosis.            



                                            C7-T1: Mild bilateral            



                                            facet arthropathy and            



                                            foraminal narrowing.            



                                            No central canal            



                                            stenosis. Pleural and            



                                            parenchymal scarring            



                                            is seen at bilateral            



                                            lung apices.            



                                            IMPRESSION: 1. No            



                                            acute intracranial            



                                            abnormality. No acute            



                                            hemorrhage, mass            



                                            lesion or infarct. 2.            



                                            Senescent changes            



                                            including brain            



                                            parenchymal volume            



                                            loss and chronic small            



                                            vessel ischemic white            



                                            matter disease.  3. No            



                                            acute fracture or            



                                            dislocation. 4.            



                                            Decompression            



                                            laminectomies from C3            



                                            through C6 with            



                                            acquired osseous            



                                            fusion of facet            



                                            joints. Persistent            



                                            spondylosis and facet            



                                            arthrosis contribute            



                                            to foraminal            



                                            narrowing. 5. Nodules            



                                            within the thyroid            



                                            gland. Largest            



                                            measures up to 1.1 cm.            



                                            PAGE 2 Signed Report            



                                            (CONTINUED) FAX:            



                                            Lashell Boswell MD            



                                            [phone] [place]:            



                                             St: DEP FAX:            



                                            James Rueda DO            



                                            ----------------------            



                                            ----------------------            



                                            ----------------------            



                                            ------------- Name:            



                                            LY FRANCIS :            



                                            1935 Age/S: 86/F            



                                            [id number] Unit:            



                                            [id number] Loc: [place]            



                                            [place] [address]            



                                            Phys: James Rueda DO R2 Acct:            



                                            [id number] Dis Date:            



                                            Status: DEP ER  PHONE            



                                            #: [phone] Exam            



                                            Date: 2021 1435            



                                            FAX #: [phone]            



                                            Reason: fall/head            



                                            stiek/ neck pain/pain            



                                            pain/hip pain/ EXAMS:            



                                            CPT CODE: [id number] CT            



                                            HEAD/BRAIN W/O CONT            



                                            63755 (Continued)            



                                            Correlation with prior            



                                            ultrasound if            



                                            available. Consider            



                                            further               



                                            characterization with            



                                            ultrasound on            



                                            nonemergent basis if            



                                            not. ** Electronically            



                                            Signed by Mejia Herrera MD            



                                            on 2021 at 1446            



                                            ** Reported and signed            



                                            by: Mejia Herrera MD CC:            



                                            Lashell Boswell MD; James Rueda DO            



                                            Technologist: Katie Pelaez Trnscrd            



                                            Dt/Tm: 2021            



                                            (1446) [email or web address] Orig            



                                            Print D/T: S:            



                                            2021 (7080  PAGE            



                                            3 Signed Report            

 

           - CT C-SPINE W/O 2021            **********************        

    



           CONT       14:46:00              **********************            



                                            [facility] 1935            



                                            Sex:                  



                                            F*********************            



                                            **********************            



                                            ***************** FAX:            



                                            Lashell Boswell MD            



                                            [phone] [place]:            



                                             St: [facility] FAX:            



                                            James Rueda DO            



                                            ----------------------            



                                            ----------------------            



                                            ----------------------            



                                            ------------- Name:            



                                            LY FRANCIS [facility] :            



                                            1935 Age/S: 86/F            



                                            [id number]  Unit:            



                                            [id number] Loc: [place] [address]            



                                            Phys: James Rueda DO R2 Acct:            



                                            [id number] Dis Date:            



                                             Status: DEP ER PHONE            



                                            #: [phone] Exam            



                                            Date: 2021 1435            



                                            FAX #: [phone]            



                                            Reason: fall/head            



                                            stiek/ neck pain/pain            



                                            pain/hip pain/ EXAMS:            



                                            CPT CODE: [id number] CT            



                                            C-SPINE W/O CONT [id number]            



                                            EXAM: 1. CT Head            



                                            without contrast 2. CT            



                                            Cervical spine without            



                                            contrast Location: [facility]24            



                                            HISTORY: Fall, head            



                                            and neck pain            



                                            COMPARISON: None            



                                            available. TECHNIQUE:            



                                            Multiple transaxial            



                                            images of the head and            



                                            cervical spine were            



                                            obtained without            



                                            intravenous contrast.            



                                            Images were            



                                            reformatted to create            



                                            coronal and sagittal            



                                            reconstructions.  One            



                                            or more of the            



                                            following dose            



                                            reduction techniques            



                                            were used: Automated            



                                            exposure control,            



                                            adjustment of the mA            



                                            and/or kV according to            



                                            patient size, and/or            



                                            utilization of            



                                            iterative             



                                            reconstruction            



                                            technique. DLP: 1443            



                                            mGy-cm. FINDINGS: Head            



                                            There is no acute            



                                            intracranial            



                                            hemorrhage. There is            



                                            no mass, mass effect,            



                                            midline shift or            



                                            extra-axial fluid            



                                            collection. Brain            



                                            parenchymal volume is            



                                            diffusely decreased            



                                            which is within normal            



                                            limits given patient's            



                                            age. Enlargement of            



                                            the right lateral            



                                            ventricles            



                                            commensurate with            



                                            degree of central            



                                            atrophy. Gray-white            



                                            differentiation is            



                                            maintained. There is            



                                            no evidence for acute            



                                            major vessel infarct.            



                                            There are areas of            



                                            decreased attenuation            



                                            within subcortical and            



                                            deep white matter            



                                            which are nonspecific.            



                                            Paranasal sinuses,            



                                            mastoid air cells and            



                                            visualized orbital            



                                            contents are within            



                                            normal limits. Bones            



                                            and calvaria and skull            



                                            base are intact. Note            



                                            is made of bilateral            



                                            lens replacements.            



                                            Intraorbital contents            



                                            are within normal            



                                            limits.  FINDINGS:            



                                            Cervical Spine            



                                            Postoperative changes            



                                            include decompression            



                                            laminectomies from C3            



                                            PAGE 1 Signed Report            



                                            (CONTINUED) FAX:            



                                            Lashell Boswell MD            



                                            [phone] [place]:            



                                             St: [facility] FAX:            



                                            James Rueda DO            



                                            ----------------------            



                                            ----------------------            



                                            ----------------------            



                                            -------------  Name:            



                                            [place] :            



                                            1935 Age/S: 86/F            



                                            [address]:            



                                            [id number] Loc: [place]            



                                            [place] [address]            



                                            Phys: James Rueda            



                                            DO R2 Acct:            



                                            [id number] Dis Date:            



                                            Status: [facility] PHONE            



                                            #: [phone] Exam            



                                            Date: 2021 1435            



                                            FAX #: [phone]            



                                            Reason: fall/head            



                                            stiek/ neck pain/pain            



                                            pain/hip pain/ EXAMS:            



                                            CPT CODE: [id number] CT            



                                            C-SPINE W/O CONT [id number]            



                                            (Continued) through            



                                            C6. There is acquired,            



                                            osseous fusion of the            



                                            facet joints. There is            



                                            no acute fracture or            



                                            dislocation. There is            



                                            diffuse osteopenia.            



                                            Vertebral body heights            



                                            are maintained. There            



                                            is fusion of the disc            



                                            spaces from C3-C4            



                                            through C5-C6. There            



                                            is mild atrophy of            



                                            paraspinal muscles.            



                                            Nodules are present            



                                            within the thyroid            



                                            gland. Largest on the            



                                            right measures up to            



                                            1.1 cm. Consider            



                                            further               



                                            characterization with            



                                            thyroid ultrasound.            



                                            Occiput-C1: Intact.            



                                            C1-C2: Narrowing of            



                                            the predental space            



                                            and subchondral            



                                            sclerosis.            



                                            Atlantoaxial joint is            



                                            maintained. Mild            



                                            central canal            



                                            stenosis.  Hypertrophy            



                                            of the right lateral            



                                            mass contributing to            



                                            moderate right            



                                            foraminal narrowing.            



                                            C2-C3: Disc osteophyte            



                                            complex measuring up            



                                            to 3 mm. Fusion of the            



                                            fused facet joints.            



                                            Mild foraminal            



                                            narrowing. Mild            



                                            central canal            



                                            stenosis. C3-C4            



                                            through C5-C6:            



                                            Decompression            



                                            laminectomies are            



                                            noted. There is            



                                            persistent spondylotic            



                                            ridging and            



                                            hypertrophy of the            



                                            fused facet joints            



                                            contributing to            



                                            moderate foraminal            



                                            narrowing. C6-C7:            



                                            Partial decompression.            



                                            Moderate to severe            



                                            bilateral facet            



                                            arthropathy and            



                                            foraminal narrowing.            



                                            No canal stenosis.            



                                            C7-T1: Mild bilateral            



                                            facet arthropathy and            



                                            foraminal narrowing.            



                                            No central canal            



                                            stenosis. Pleural and            



                                            parenchymal scarring            



                                            is seen at bilateral            



                                            lung apices.            



                                            IMPRESSION: 1. No            



                                            acute intracranial            



                                            abnormality. No acute            



                                            hemorrhage, mass            



                                            lesion or infarct. 2.            



                                            Senescent changes            



                                            including brain            



                                            parenchymal volume            



                                            loss and chronic small            



                                            vessel ischemic white            



                                            matter disease. 3. No            



                                            acute fracture or            



                                            dislocation. 4.            



                                            Decompression            



                                            laminectomies from C3            



                                            through C6 with            



                                            acquired osseous            



                                            fusion of facet            



                                            joints. Persistent            



                                            spondylosis and facet            



                                            arthrosis contribute            



                                            to foraminal            



                                            narrowing. 5. Nodules            



                                            within the thyroid            



                                            gland. Largest            



                                            measures up to 1.1 cm.            



                                            PAGE 2 Signed Report            



                                            (CONTINUED) FAX:            



                                            Lashell Boswell MD            



                                            [phone] [place]:            



                                            [facility]: [facility] FAX:            



                                            James Rueda DO            



                                            ----------------------            



                                            ----------------------            



                                            ----------------------            



                                            ------------- Name:            



                                            [facility]            



                                            [place] :            



                                            1935 Age/S: 86/F            



                                            [address]:            



                                            [id number] Loc: [place]            



                                            [place] [address]            



                                            Phys: James Rueda            



                                            DO R2  Acct:            



                                            [id number] Dis Date:            



                                            Status: DEP ER PHONE            



                                            #: [phone] Exam            



                                            Date: 2021 1435            



                                            FAX #: [phone]            



                                            Reason: fall/head            



                                            stiek/ neck pain/pain            



                                            pain/hip pain/ EXAMS:            



                                            CPT CODE: [id number] CT            



                                            C-SPINE W/O CONT 38272            



                                             (Continued)            



                                            Correlation with prior            



                                            ultrasound if            



                                            available. Consider            



                                            further               



                                            characterization with            



                                            ultrasound on            



                                            nonemergent basis if            



                                            not. ** Electronically            



                                            Signed by Mejia Herrera MD            



                                            on 2021 at 1446            



                                            ** Reported and signed            



                                            by: Mejia Herrera MD CC:            



                                            Lashell Boswell MD; James Rueda DO            



                                            Technologist: Katie Pelaez Trnscrd            



                                            Dt/Tm: 2021            



                                            (1446) [email or web address] Orig            



                                            Print D/T: S:            



                                            2021 (8299 PAGE            



                                            3 Signed Report            

 

           - XR HIP W/PEL UNI 2020            **********************      

      



           2+V RT     12:47:00              **********************            



                                            [facility] 1935            



                                            Sex:                  



                                            F*********************            



                                            **********************            



                                            ***************** FAX:            



                                            Octavio Rahman MD            



                                            [phone] [place]:            



                                             St: [facility] FAX: Alberto Crowley PA-            



                                            [phone]            



                                            ----------------------            



                                            ----------------------            



                                            ----------------------            



                                            ------------- Name:            



                                            LY FRANCIS  [facility] :            



                                            1935 Age/S: 85/F            



                                            [address]:            



                                            [id number] Loc: C.[place]            



                                            [place] [address]            



                                            Phys: Octavio Gilliam MD Acct: [id number]            



                                            Dis Date: Status: REG            



                                            CLI PHONE #:            



                                            [phone] Exam            



                                            Date:  2020 1151            



                                            FAX #: [phone]            



                                            Reason: UNSP FRACTURE            



                                            OF RIGHT FEMUR, INIT            



                                            FOR CLOS FX EXAMS: CPT            



                                            CODE: [id number] XR HIP            



                                            W/PEL UNI 2+V RT [id number]            



                                            EXAM: - XR HIP W/PEL            



                                            UNI 2+V RT HISTORY:            



                                            UNSP FRACTURE OF RIGHT            



                                            FEMUR, INIT FOR CLOS            



                                            FX Location code:C3            



                                            COMPARISON: 10/4/2020            



                                            FINDINGS: AP view of            



                                            the pelvis with AP and            



                                            frog-leg lateral view            



                                            of the right hip is            



                                            provided. Right hip            



                                            hemiarthroplasty is            



                                            present with proximal            



                                            cerclage wires. There            



                                            is no periprosthetic            



                                            fracture. The hardware            



                                            appears intact and            



                                            fully engaged. The            



                                            left femoral head is            



                                            located.  IMPRESSION:            



                                            1. Right hip            



                                            arthroplasty is            



                                            present without acute            



                                            osseous abnormality.            



                                            ** Electronically            



                                            Signed by Sriram Meneses MD ** **  on            



                                            2020 at 4177 **            



                                            Reported and signed            



                                            by: Sriram Meneses MD CC: Octavio Gilliam MD; Alberto Salomon            



                                            Technologist: MEJIA Walton            



                                            Date/Time/By:            



                                            2020 (1907) :            



                                            By: [email or web address] PAGE 1            



                                            Signed Report FAX: Octavio Rahman MD            



                                            [phone] [place]:            



                                             St: REG FAX: Alberto Crowley-            



                                            [phone]            



                                            ----------------------            



                                            ----------------------            



                                            ----------------------            



                                            ------------- Name:            



                                            LY FRANCIS [facility]            



                                            [place] :            



                                            1935 Age/S: 85/F            



                                            [address]:            



                                            [id number] [place] [address]            



                                            Phys: Octavio Gilliam MD  Acct: [id number]            



                                            Dis Date: Status: REG            



                                            CLI PHONE #:            



                                            [phone] Exam            



                                            Date: 2020 1151            



                                            FAX #: [phone]            



                                            Reason: UNSP FRACTURE            



                                            OF RIGHT FEMUR, INIT            



                                            FOR CLOS FX EXAMS: CPT            



                                            CODE: [id number] XR HIP            



                                            W/PEL UNI 2+V RT [id number]            



                                            (Continued) Orig Print            



                                            D/T: S: 2020            



                                            (9293) PAGE 2  Signed            



                                            Report                









                    COMPREHENSIVE METABOLIC PANEL 2020-10-06 07:08:00 









                      Test Item  Value      Reference Range Interpretation Comme

nts









             SODIUM (test code = NA) 138 mmol/L   137-145      N            

 

             POTASSIUM (test code = K) 3.8 mmol/L   3.4-5.0      N            

 

             CHLORIDE (test code = CL) 113 mmol/L          H            

 

             CARBON DIOXIDE (test code = 20 mmol/L    22-30        L            



             CO2)                                                

 

             GLUCOSE (test code = GLU) 99 mg/dL            N            

 

             BLOOD UREA NITROGEN (test code 16 mg/dL     7-17         N         

   



             = BUN)                                              

 

             GLOMERULAR FILTRATION RATE 63           >60                       T

he estimated glomerular



             (test code = GFR)                                        filtration

 rate is computed



                                                                 usingpatient ra

ce, age (>18),



                                                                 sex, and serum 

creatinine. If



                                                                 anyof the neede

d data elements



                                                                 are missing the

 Laboratory cannot



                                                                 compute an amanda

mation of the



                                                                 glomerular filt

ration rate.

 

             CREATININE (test code = CREAT) 0.9 mg/dL    0.5-1.0      N         

   

 

             TOTAL PROTEIN (test code = 5.4 g/dL     6.3-8.2      L            



             PROT)                                               

 

             ALBUMIN (test code = ALB) 2.8 g/dL     3.5-5.0      L            

 

             CALCIUM (test code = CA) 8.2 mg/dL    8.4-10.2     L            

 

             BILIRUBIN TOTAL (test code = 0.7 mg/dL    0.2-1.3      N           

 "A positive bias may occur for



             BILT)                                               patients taking

 Eltrombopag(a



                                                                 bone marrow sti

mulant used to



                                                                 treat thrombocy

topenia



                                                                 andaplastic ane

maicol)."

 

             BILIRUBIN CONJUGATED (test 0 mg/dL      0-0.3        N            "

A positive bias may occur for



             code = BILCON)                                        patients taki

ng Eltrombopag(a



                                                                 bone marrow sti

mulant used to



                                                                 treat thrombocy

topenia



                                                                 andaplastic ane

maicol)."



                                                                 ***************

******************



                                                                 ***************

************CONJUG



                                                                 ATED BILIRUBIN 

IS THE REPLACEMENT



                                                                 ASSAY FOR



                                                                 DIRECTBILIRUBIN

.*****************



                                                                 ***************

******************



                                                                 **********

 

             BILIRUBIN UNCONJUGATED (test 0.5 mg/dL    0-1.1        N           

 



             code = BILUNC)                                        

 

             SGOT/AST (test code = AST) 38 U/L       15-46        N            

 

             SGPT/ALT (test code = ALT) 11 U/L       0-34         N            

 

             ALKALINE PHOSPHATASE (test 41 U/L              N            



             code = ALKP)                                        



COMPREHENSIVE METABOLIC PANEL2020-10-06 07:07:00





             Test Item    Value        Reference Range Interpretation Comments

 

             SODIUM (test code = 138 mmol/L   137-145      N            



             NA)                                                 

 

             POTASSIUM (test code 3.8 mmol/L   3.4-5.0      N            



             = K)                                                

 

             CHLORIDE (test code 113 mmol/L          H            



             = CL)                                               

 

             CARBON DIOXIDE (test 20 mmol/L    22-30        L            



             code = CO2)                                         

 

             GLUCOSE (test code = 99 mg/dL            N            



             GLU)                                                

 

             BLOOD UREA NITROGEN 16 mg/dL     7-17         N            



             (test code = BUN)                                        

 

             GLOMERULAR   63           >60                       The estimated



             FILTRATION RATE                                        glomerular f

iltration



             (test code = GFR)                                        rate is co

mputed



                                                                 usingpatient ra

ce, age



                                                                 (>18), sex, and

 serum



                                                                 creatinine. If 

anyof



                                                                 the needed data



                                                                 elements are mi

ssing



                                                                 the Laboratory 

cannot



                                                                 compute an amanda

mation



                                                                 of the glomerul

ar



                                                                 filtration rate

.

 

             CREATININE (test 0.9 mg/dL    0.5-1.0      N            



             code = CREAT)                                        

 

             TOTAL PROTEIN (test 5.4 g/dL     6.3-8.2      L            



             code = PROT)                                        

 

             ALBUMIN (test code = 2.8 g/dL     3.5-5.0      L            



             ALB)                                                

 

             CALCIUM (test code = 8.2 mg/dL    8.4-10.2     L            



             CA)                                                 

 

             BILIRUBIN TOTAL 0.7 mg/dL    0.2-1.3      N            "A positive 

bias may



             (test code = BILT)                                        occur for

 patients



                                                                 taking Eltrombo

pag(a



                                                                 bone marrow sti

mulant



                                                                 used to treat



                                                                 thrombocytopeni

a



                                                                 andaplastic ane

maicol)."

 

             BILIRUBIN CONJUGATED 0 mg/dL      0-0.3        N            "A posi

tive bias may



             (test code = BILCON)                                        occur f

or patients



                                                                 taking Eltrombo

pag(a



                                                                 bone marrow sti

mulant



                                                                 used to treat



                                                                 thrombocytopeni

a



                                                                 andaplastic ane

maicol)."



                                                                 ***************

********



                                                                 ***************

********



                                                                 **************C

ONJUGATE



                                                                 D BILIRUBIN IS 

THE



                                                                 REPLACEMENT ASS

AY FOR



                                                                 DIRECTBILIRUBIN

.*******



                                                                 ***************

********



                                                                 ***************

********



                                                                 *******

 

             BILIRUBIN    0.5 mg/dL    0-1.1        N            



             UNCONJUGATED (test                                        



             code = BILUNC)                                        

 

             SGOT/AST (test code 38 U/L       15-46        N            



             = AST)                                              

 

             SGPT/ALT (test code  U/L         0-34                      



             = ALT)                                              

 

             ALKALINE PHOSPHATASE 41 U/L              N            



             (test code = ALKP)                                        



CBC W/AUTO DIFF2020-10-06 06:52:00





             Test Item    Value        Reference Range Interpretation Comments

 

             WHITE BLOOD CELL (test code = 7.1 x10 3/uL 5.0-12.0     N          

  



             WBC)                                                

 

             RED BLOOD CELL (test code = 3.07 x10 6/uL 4.20-5.40    L           

 



             RBC)                                                

 

             HEMOGLOBIN (test code = HGB) 9.3 g/dL     12.0-16.0    L           

 

 

             HEMATOCRIT (test code = HCT) 30.4 %       36.0-46.0    L           

 

 

             MEAN CELL VOLUME (test code = 99 fL        81-99        N          

  



             MCV)                                                

 

             MEAN CELL HGB (test code = MCH) 30.3 pg      27-31        N        

    

 

             MEAN CELL HGB CONCENTRATION 30.6 g/dL    33-37        L            



             (test code = MCHC)                                        

 

             RED CELL DISTRIBUTION WIDTH 12.7 %       11.5-15.5    N            



             (test code = RDW)                                        

 

             PLATELET COUNT (test code = 165 x10 3/uL 130-400      N            



             PLT)                                                

 

             MEAN PLATELET VOLUME (test code 8.9 fL       9.4-16.4     L        

    



             = MPV)                                              

 

             NEUTROPHIL % (test code = NT%) 73.0 %       43-65        H         

   

 

             IMMATURE GRANULOCYTE % (test 0.3 %        0.0-2.0      N           

 



             code = IG%)                                         

 

             LYMPHOCYTE % (test code = LY%) 12.9 %       20.5-45.5    L         

   

 

             MONOCYTE % (test code = MO%) 9.3 %        5.5-11.7     N           

 

 

             EOSINOPHIL % (test code = EO%) 4.1 %        0.9-2.9      H         

   

 

             BASOPHIL % (test code = BA%) 0.4 %        0.2-1.0      N           

 

 

             NUCLEATED RBC % (test code = 0.0 %        0-1.0        N           

 



             NRBC%)                                              

 

             NEUTROPHIL # (test code = NT#) 5.21 x10 3/uL 2.2-4.8      H        

    

 

             IMMATURE GRANULOCYTE # (test 0.02 x10 3/uL 0-0.03       N          

  



             code = IG#)                                         

 

             LYMPHOCYTE # (test code = LY#) 0.92 x10 3/uL 1.3-2.9      L        

    

 

             MONOCYTE # (test code = MO#) 0.66 x10 3/uL 0.3-0.8      N          

  

 

             EOSINOPHIL # (test code = EO#) 0.29 x10 3/uL 0.0-0.2      H        

    

 

             BASOPHIL # (test code = BA#) 0.03 x10 3/uL 0.0-0.1      N          

  



HGB HCT2020-10-05 12:12:00





             Test Item    Value        Reference Range Interpretation Comments

 

             HEMOGLOBIN (test code = HGB) 9.2 g/dL     12.0-16.0    L           

 

 

             HEMATOCRIT (test code = HCT) 28.0 %       36.0-46.0    L           

 



BASIC METABOLIC PANEL2020-10-05 08:05:00





             Test Item    Value        Reference Range Interpretation Comments

 

             SODIUM (test code = 133 mmol/L   137-145      L            



             NA)                                                 

 

             POTASSIUM (test code 4.5 mmol/L   3.4-5.0      N            



             = K)                                                

 

             CHLORIDE (test code = 106 mmol/L          N            



             CL)                                                 

 

             CARBON DIOXIDE (test 23 mmol/L    22-30        N            



             code = CO2)                                         

 

             GLUCOSE (test code = 108 mg/dL           H            



             GLU)                                                

 

             BLOOD UREA NITROGEN 22 mg/dL     7-17         H            



             (test code = BUN)                                        

 

             GLOMERULAR FILTRATION 73           >60                       The es

timated



             RATE (test code =                                        glomerular

 filtration



             GFR)                                                rate is compute

d



                                                                 usingpatient ra

ce, age



                                                                 (>18), sex, and

 serum



                                                                 creatinine. If 

anyof



                                                                 the needed data



                                                                 elements are mi

ssing



                                                                 the Laboratory 

cannot



                                                                 compute an amanda

mation



                                                                 of the glomerul

ar



                                                                 filtration rate

.

 

             CREATININE (test code 0.8 mg/dL    0.5-1.0      N            



             = CREAT)                                            

 

             CALCIUM (test code = 8.0 mg/dL    8.4-10.2     L            



             CA)                                                 



COMPREHENSIVE METABOLIC PANEL2020-10-05 08:05:00





             Test Item    Value        Reference Range Interpretation Comments

 

             TOTAL PROTEIN (test 4.9 g/dL     6.3-8.2      L            



             code = PROT)                                        

 

             ALBUMIN (test code = 2.7 g/dL     3.5-5.0      L            



             ALB)                                                

 

             BILIRUBIN TOTAL 0.5 mg/dL    0.2-1.3      N            "A positive 

bias may



             (test code = BILT)                                        occur for

 patients



                                                                 taking Eltrombo

pag(a



                                                                 bone marrow sti

mulant



                                                                 used to treat



                                                                 thrombocytopeni

a



                                                                 andaplastic ane

maicol)."

 

             BILIRUBIN CONJUGATED 0 mg/dL      0-0.3        N            "A posi

tive bias may



             (test code = BILCON)                                        occur f

or patients



                                                                 taking Eltrombo

pag(a



                                                                 bone marrow sti

mulant



                                                                 used to treat



                                                                 thrombocytopeni

a



                                                                 andaplastic ane

maicol)."



                                                                 ***************

*********



                                                                 ***************

*********



                                                                 ************CON

JUGATED



                                                                 BILIRUBIN IS TH

E



                                                                 REPLACEMENT ASS

AY FOR



                                                                 DIRECTBILIRUBIN

.********



                                                                 ***************

*********



                                                                 ***************

*********



                                                                 ****

 

             BILIRUBIN    0.5 mg/dL    0-1.1        N            



             UNCONJUGATED (test                                        



             code = BILUNC)                                        

 

             SGOT/AST (test code 41 U/L       15-46        N            



             = AST)                                              

 

             SGPT/ALT (test code 15 U/L       0-34         N            



             = ALT)                                              

 

             ALKALINE PHOSPHATASE 38 U/L              N            



             (test code = ALKP)                                        



CBC W/AUTO DIFF2020-10-05 07:36:00





             Test Item    Value        Reference Range Interpretation Comments

 

             WHITE BLOOD CELL (test code = 8.3 x10 3/uL 5.0-12.0     N          

  



             WBC)                                                

 

             RED BLOOD CELL (test code = 3.02 x10 6/uL 4.20-5.40    L           

 



             RBC)                                                

 

             HEMOGLOBIN (test code = HGB) 9.3 g/dL     12.0-16.0    L           

 

 

             HEMATOCRIT (test code = HCT) 29.6 %       36.0-46.0    L           

 

 

             MEAN CELL VOLUME (test code = 98 fL        81-99        N          

  



             MCV)                                                

 

             MEAN CELL HGB (test code = MCH) 30.8 pg      27-31        N        

    

 

             MEAN CELL HGB CONCENTRATION 31.4 g/dL    33-37        L            



             (test code = MCHC)                                        

 

             RED CELL DISTRIBUTION WIDTH 12.4 %       11.5-15.5    N            



             (test code = RDW)                                        

 

             PLATELET COUNT (test code = 161 x10 3/uL 130-400      N            



             PLT)                                                

 

             MEAN PLATELET VOLUME (test code 8.7 fL       9.4-16.4     L        

    



             = MPV)                                              

 

             NEUTROPHIL % (test code = NT%) 78.6 %       43-65        H         

   

 

             IMMATURE GRANULOCYTE % (test 0.2 %        0.0-2.0      N           

 



             code = IG%)                                         

 

             LYMPHOCYTE % (test code = LY%) 11.5 %       20.5-45.5    L         

   

 

             MONOCYTE % (test code = MO%) 8.8 %        5.5-11.7     N           

 

 

             EOSINOPHIL % (test code = EO%) 0.8 %        0.9-2.9      L         

   

 

             BASOPHIL % (test code = BA%) 0.1 %        0.2-1.0      L           

 

 

             NUCLEATED RBC % (test code = 0.0 %        0-1.0        N           

 



             NRBC%)                                              

 

             NEUTROPHIL # (test code = NT#) 6.53 x10 3/uL 2.2-4.8      H        

    

 

             IMMATURE GRANULOCYTE # (test 0.02 x10 3/uL 0-0.03       N          

  



             code = IG#)                                         

 

             LYMPHOCYTE # (test code = LY#) 0.96 x10 3/uL 1.3-2.9      L        

    

 

             MONOCYTE # (test code = MO#) 0.73 x10 3/uL 0.3-0.8      N          

  

 

             EOSINOPHIL # (test code = EO#) 0.07 x10 3/uL 0.0-0.2      N        

    

 

             BASOPHIL # (test code = BA#) 0.01 x10 3/uL 0.0-0.1      N          

  



BASIC METABOLIC PANEL2020-10-04 18:24:00





             Test Item    Value        Reference Range Interpretation Comments

 

             SODIUM (test code = 134 mmol/L   137-145      L            



             NA)                                                 

 

             POTASSIUM (test code 4.2 mmol/L   3.4-5.0      N            



             = K)                                                

 

             CHLORIDE (test code = 105 mmol/L          N            



             CL)                                                 

 

             CARBON DIOXIDE (test 24 mmol/L    22-30        N            



             code = CO2)                                         

 

             GLUCOSE (test code = 117 mg/dL           H            



             GLU)                                                

 

             BLOOD UREA NITROGEN 16 mg/dL     7-17         N            



             (test code = BUN)                                        

 

             GLOMERULAR FILTRATION 101          >60                       The es

timated



             RATE (test code =                                        glomerular

 filtration



             GFR)                                                rate is compute

d



                                                                 usingpatient ra

ce, age



                                                                 (>18), sex, and

 serum



                                                                 creatinine. If 

anyof



                                                                 the needed data



                                                                 elements are mi

ssing



                                                                 the Laboratory 

cannot



                                                                 compute an amanda

mation



                                                                 of the glomerul

ar



                                                                 filtration rate

.

 

             CREATININE (test code 0.6 mg/dL    0.5-1.0      N            



             = CREAT)                                            

 

             CALCIUM (test code = 8.6 mg/dL    8.4-10.2     N            



             CA)                                                 



[id number] 18:24:00





             Test Item    Value        Reference Range Interpretation Comments

 

             PHOSPHOROUS (test code = PHOS) 3.5 mg/dL    2.5-4.5      N         

   



MAGNESIUM2020-10-04 18:24:00





             Test Item    Value        Reference Range Interpretation Comments

 

             MAGNESIUM (test code = MAG) 1.8 mg/dL    1.6-2.3      N            



BASIC METABOLIC PANEL2020-10-04 18:15:00





             Test Item    Value        Reference Range Interpretation Comments

 

             SODIUM (test code = 134 mmol/L   137-145      L            



             NA)                                                 

 

             POTASSIUM (test code 4.2 mmol/L   3.4-5.0      N            



             = K)                                                

 

             CHLORIDE (test code = 105 mmol/L          N            



             CL)                                                 

 

             CARBON DIOXIDE (test 24 mmol/L    22-30        N            



             code = CO2)                                         

 

             GLUCOSE (test code = 117 mg/dL           H            



             GLU)                                                

 

             BLOOD UREA NITROGEN 16 mg/dL     7-17         N            



             (test code = BUN)                                        

 

             GLOMERULAR FILTRATION 101          >60                       The es

timated



             RATE (test code =                                        glomerular

 filtration



             GFR)                                                rate is compute

d



                                                                 usingpatient ra

ce, age



                                                                 (>18), sex, and

 serum



                                                                 creatinine. If 

anyof



                                                                 the needed data



                                                                 elements are mi

ssing



                                                                 the Laboratory 

cannot



                                                                 compute an amanda

mation



                                                                 of the glomerul

ar



                                                                 filtration rate

.

 

             CREATININE (test code 0.6 mg/dL    0.5-1.0      N            



             = CREAT)                                            

 

             CALCIUM (test code = 8.6 mg/dL    8.4-10.2     N            



             CA)                                                 



[id number] 18:15:00





             Test Item    Value        Reference Range Interpretation Comments

 

             PHOSPHOROUS (test code = PHOS)  mg/dL       2.5-4.5                

   



MAGNESIUM2020-10-04 18:15:00





             Test Item    Value        Reference Range Interpretation Comments

 

             MAGNESIUM (test code = MAG)  mg/dL       1.6-2.3                   



CBC W/AUTO DIFF2020-10-04 17:42:00





             Test Item    Value        Reference Range Interpretation Comments

 

             WHITE BLOOD CELL (test code = 10.3 x10 3/uL 5.0-12.0     N         

   



             WBC)                                                

 

             RED BLOOD CELL (test code = 3.98 x10 6/uL 4.20-5.40    L           

 



             RBC)                                                

 

             HEMOGLOBIN (test code = HGB) 12.7 g/dL    12.0-16.0    N           

 

 

             HEMATOCRIT (test code = HCT) 38.0 %       36.0-46.0    N           

 

 

             MEAN CELL VOLUME (test code = 96 fL        81-99        N          

  



             MCV)                                                

 

             MEAN CELL HGB (test code = MCH) 31.9 pg      27-31        H        

    

 

             MEAN CELL HGB CONCENTRATION 33.4 g/dL    33-37        N            



             (test code = MCHC)                                        

 

             RED CELL DISTRIBUTION WIDTH 12.0 %       11.5-15.5    N            



             (test code = RDW)                                        

 

             PLATELET COUNT (test code = 194 x10 3/uL 130-400      N            



             PLT)                                                

 

             MEAN PLATELET VOLUME (test code 8.8 fL       9.4-16.4     L        

    



             = MPV)                                              

 

             NEUTROPHIL % (test code = NT%) 90.3 %       43-65        H         

   

 

             IMMATURE GRANULOCYTE % (test 0.2 %        0.0-2.0      N           

 



             code = IG%)                                         

 

             LYMPHOCYTE % (test code = LY%) 5.9 %        20.5-45.5    L         

   

 

             MONOCYTE % (test code = MO%) 3.5 %        5.5-11.7     L           

 

 

             EOSINOPHIL % (test code = EO%) 0.0 %        0.9-2.9      L         

   

 

             BASOPHIL % (test code = BA%) 0.1 %        0.2-1.0      L           

 

 

             NUCLEATED RBC % (test code = 0.0 %        0-1.0        N           

 



             NRBC%)                                              

 

             NEUTROPHIL # (test code = NT#) 9.29 x10 3/uL 2.2-4.8      H        

    

 

             IMMATURE GRANULOCYTE # (test 0.02 x10 3/uL 0-0.03       N          

  



             code = IG#)                                         

 

             LYMPHOCYTE # (test code = LY#) 0.61 x10 3/uL 1.3-2.9      L        

    

 

             MONOCYTE # (test code = MO#) 0.36 x10 3/uL 0.3-0.8      N          

  

 

             EOSINOPHIL # (test code = EO#) 0.00 x10 3/uL 0.0-0.2      N        

    

 

             BASOPHIL # (test code = BA#) 0.01 x10 3/uL 0.0-0.1      N          

  



- XR PELVIS 1/2 VIEWS2020-10-04 14:50:00 FAX: Roxane Gentile MD [phone] 
[place]: [facility]: ADM FAX: Robert Truong [phone] FAX: Alberto Crowley [phone] 
------------------------------------------------------------------------------- 
Name: LY FRANCIS [facility] : 1935 Age/S: 84/F [address] 
#: [id number] Loc: [address] [address] Phys: Robert Truong MD 
Acct: [id number] Dis Date: Status: ADM IN PHONE #: [phone] Exam Date: 
10/04/2020 1400 FAX #: [phone] Reason: RIGHT HIPREPLACEMENT EXAMS: CPT 
CODE: [id number] XR PELVIS 1/2 VIEWS 45232 EXAM: - XR PELVIS 1/2 VIEWS INDICATI
ON: RIGHT HIP REPLACEMENT Location: T 18. COMPARISON: Radiograph performed 
earlier the same day. TECHNIQUE: 2 views. FINDINGS: Patient is post right hip 
arthroplasty for fixation of the right femur neck fracture. Postoperative soft 
tissue swelling and gas seen. IMPRESSION: Right hip arthroplasty. ** El
ectronically Signed by Tejinder Johnson MD on 10/04/2020 at 1450 ** Reported and 
signed by: Tejinder Johnson [facility]: Roxane Julian MD; Robert Truong MD; Alberto Salomon Technologist: Carmelo Lopez[place] Trnscrd Date/Time/By: 
10/04/2020 (5497) : By: [email or web address] PAGE 1 Signed Report FAX: Roxane Gentile MD 
[phone] [place]:  St: [facility] FAX: Robert Truong [phone] FAX: Alberto Crowley [phone] 
------------------------------------------------------------------------------- 
Name: LY FRANCIS [facility] : 1935 Age/S: 84/F [address] 
#: [id number] Loc: [address] [address] Phys: Robert Truong MD 
Acct: [id number] Dis Date: Status: ADMIN  PHONE #: [phone] Exam Date: 
10/04/2020 1400 FAX #: [phone] Reason: RIGHT HIP REPLACEMENT EXAMS: CPT 
CODE: [id number] XR PELVIS 1/2 VIEWS 35545 (Continued) Orig Print D/T: S: 
10/04/2020 (0307)  PAGE 2 Signed ReportCOVID 19 Asymptomatic IH AG2020-10-04 
08:35:00





             Test Item    Value        Reference Range Interpretation Comments

 

             COVID 19 Asymptomatic IH AG (test NEGATIVE     Negative            

      



             code = COVNONPUIAG)                                        



BASIC METABOLIC PANEL2020-10-04 06:15:00





             Test Item    Value        Reference Range Interpretation Comments

 

             SODIUM (test code = 136 mmol/L   137-145      L            



             NA)                                                 

 

             POTASSIUM (test code 4.0 mmol/L   3.4-5.0      N            



             = K)                                                

 

             CHLORIDE (test code = 107 mmol/L          N            



             CL)                                                 

 

             CARBON DIOXIDE (test 18 mmol/L    22-30        L            



             code = CO2)                                         

 

             GLUCOSE (test code = 131 mg/dL           H            



             GLU)                                                

 

             BLOOD UREA NITROGEN 18 mg/dL     7-17         H            



             (test code = BUN)                                        

 

             GLOMERULAR FILTRATION 125          >60                       The es

timated



             RATE (test code =                                        glomerular

 filtration



             GFR)                                                rate is compute

d



                                                                 usingpatient ra

ce, age



                                                                 (>18), sex, and

 serum



                                                                 creatinine. If 

anyof



                                                                 the needed data



                                                                 elements are mi

ssing



                                                                 the Laboratory 

cannot



                                                                 compute an amanda

mation



                                                                 of the glomerul

ar



                                                                 filtration rate

.

 

             CREATININE (test code 0.5 mg/dL    0.5-1.0      N            



             = CREAT)                                            

 

             CALCIUM (test code = 8.7 mg/dL    8.4-10.2     N            



             CA)                                                 



[id number] 06:15:00





             Test Item    Value        Reference Range Interpretation Comments

 

             PHOSPHOROUS (test code = PHOS) 3.3 mg/dL    2.5-4.5      N         

   



MAGNESIUM2020-10-04 06:15:00





             Test Item    Value        Reference Range Interpretation Comments

 

             MAGNESIUM (test code = MAG) 1.8 mg/dL    1.6-2.3      N            



BASIC METABOLIC PANEL2020-10-04 06:14:00





             Test Item    Value        Reference Range Interpretation Comments

 

             SODIUM (test code = 136 mmol/L   137-145      L            



             NA)                                                 

 

             POTASSIUM (test code 4.0 mmol/L   3.4-5.0      N            



             = K)                                                

 

             CHLORIDE (test code = 107 mmol/L          N            



             CL)                                                 

 

             CARBON DIOXIDE (test 18 mmol/L    22-30        L            



             code = CO2)                                         

 

             GLUCOSE (test code = 131 mg/dL           H            



             GLU)                                                

 

             BLOOD UREA NITROGEN 18 mg/dL     7-17         H            



             (test code = BUN)                                        

 

             GLOMERULAR FILTRATION 125          >60                       The es

timated



             RATE (test code =                                        glomerular

 filtration



             GFR)                                                rate is compute

d



                                                                 usingpatient ra

ce, age



                                                                 (>18), sex, and

 serum



                                                                 creatinine. If 

anyof



                                                                 the needed data



                                                                 elements are mi

ssing



                                                                 the Laboratory 

cannot



                                                                 compute an amanda

mation



                                                                 of the glomerul

ar



                                                                 filtration rate

.

 

             CREATININE (test code 0.5 mg/dL    0.5-1.0      N            



             = CREAT)                                            

 

             CALCIUM (test code = 8.7 mg/dL    8.4-10.2     N            



             CA)                                                 



[id number] 06:14:00





             Test Item    Value        Reference Range Interpretation Comments

 

             PHOSPHOROUS (test code = PHOS)  mg/dL       2.5-4.5                

   



MAGNESIUM2020-10-04 06:14:00





             Test Item    Value        Reference Range Interpretation Comments

 

             MAGNESIUM (test code = MAG)  mg/dL       1.6-2.3                   



CBC W/AUTO DIFF2020-10-04 05:58:00





             Test Item    Value        Reference Range Interpretation Comments

 

             WHITE BLOOD CELL (test code = 11.0 x10 3/uL 5.0-12.0     N         

   



             WBC)                                                

 

             RED BLOOD CELL (test code = 4.07 x10 6/uL 4.20-5.40    L           

 



             RBC)                                                

 

             HEMOGLOBIN (test code = HGB) 12.8 g/dL    12.0-16.0    N           

 

 

             HEMATOCRIT (test code = HCT) 39.3 %       36.0-46.0    N           

 

 

             MEAN CELL VOLUME (test code = 97 fL        81-99        N          

  



             MCV)                                                

 

             MEAN CELL HGB (test code = MCH) 31.4 pg      27-31        H        

    

 

             MEAN CELL HGB CONCENTRATION 32.6 g/dL    33-37        L            



             (test code = MCHC)                                        

 

             RED CELL DISTRIBUTION WIDTH 12.0 %       11.5-15.5    N            



             (test code = RDW)                                        

 

             PLATELET COUNT (test code = 215 x10 3/uL 130-400      N            



             PLT)                                                

 

             MEAN PLATELET VOLUME (test code 8.9 fL       9.4-16.4     L        

    



             = MPV)                                              

 

             NEUTROPHIL % (test code = NT%) 90.6 %       43-65        H         

   

 

             IMMATURE GRANULOCYTE % (test 0.4 %        0.0-2.0      N           

 



             code = IG%)                                         

 

             LYMPHOCYTE % (test code = LY%) 5.7 %        20.5-45.5    L         

   

 

             MONOCYTE % (test code = MO%) 3.1 %        5.5-11.7     L           

 

 

             EOSINOPHIL % (test code = EO%) 0.0 %        0.9-2.9      L         

   

 

             BASOPHIL % (test code = BA%) 0.2 %        0.2-1.0      N           

 

 

             NUCLEATED RBC % (test code = 0.0 %        0-1.0        N           

 



             NRBC%)                                              

 

             NEUTROPHIL # (test code = NT#) 9.95 x10 3/uL 2.2-4.8      H        

    

 

             IMMATURE GRANULOCYTE # (test 0.04 x10 3/uL 0-0.03       H          

  



             code = IG#)                                         

 

             LYMPHOCYTE # (test code = LY#) 0.63 x10 3/uL 1.3-2.9      L        

    

 

             MONOCYTE # (test code = MO#) 0.34 x10 3/uL 0.3-0.8      N          

  

 

             EOSINOPHIL # (test code = EO#) 0.00 x10 3/uL 0.0-0.2      N        

    

 

             BASOPHIL # (test code = BA#) 0.02 x10 3/uL 0.0-0.1      N          

  



- XR KNEE 1 OR 2 V BI2020-10- 03:33:00 FAX: Rocky Donohue MD [place]:  St:
[facility] FAX: Alberto Crowley PA- [phone] ----------------
--------------------------------------------------------------- Name: LY FRANCIS [facility] :1935 Age/S: 84/F [address] Unit #: [id number] 
Loc: [address] [address] Phys: Rocky Donohue MD R1 Acct: [id number] 
Dis Date: Status: ADM IN  PHONE #: [phone] Exam Date: 10/04/2020 0200 FAX 
#: [phone] Reason: fall, pain with ROM EXAMS:  CPT CODE: [id number] XR KNEE 
1 OR 2 VBI 510[address] AFTER HOURS SERVICE ON: 10/4/2020 3:29 AM Left Femur, 4 Views  
Location Code M12 History: fall, pain with ROM Findings: There is osteopenia. 
There is no fracture or dislocation. There is no periosteal elevation. No lytic 
or blastic lesions. Impression: Osteopenia. No fracture. ****************
****************************** AFTER HOURS SERVICE ON: 10/4/2020 3:29 AM Right 
Femur, 4 Views Location Code M12  History: fall, pain with ROM Findings: There 
is an impacted mildly displaced femoral neck fracture. There is osteopenia. 
Femoral head is intact. Impression: Mildly displaced impacted right femoral neck
fracture. ********************************************** PAGE 1 Signed Report 
(CONTINUED) FAX: Rocky Donohue MD [place]: [facility]: ADM FAX: Alberto Crowley- [phone] ----------------
--------------------------------------------------------------- Name: LY FRANCIS [facility] :1935 Age/S: 84/F  [address] Unit #: [id number] 
Loc: [address] [address] Phys: Rocky Donohue MD R1 Acct: [id number] Dis
Date: Status: ADM IN PHONE #: [phone] Exam Date: 10/04/2020 0200 FAX #: 
[phone] Reason: fall, pain with ROM EXAMS: CPT CODE: [id number] XR KNEE 1 OR
2 [place] [address] (Continued) AFTER HOURS SERVICE ON: 10/4/2020 3:29 AM Right Knee, 3
Views Location Code M12 History: fall, pain with ROM Findings: There is 
osteopenia. Advanced degenerative tricompartmental joint space narrowing is 
noted. There is no fracture. Femoral condyles and tibial plateaus are unremarkab
le. Impression: Advanced osteoarthritic changes. No fracture. 
*********************************** AFTER HOURS SERVICE ON: 10/4/2020 3:29 AM 
Left Knee, 3 Views Location Code M12  History: fall, pain with ROM Findings: 
There is osteopenia. Advanced degenerative tricompartmental joint space 
narrowing is noted. There is no fracture. Femoral condyles and tibial plateaus 
are unremarkable. Impression: PAGE 2 Signed Report (CONTINUED) FAX: 
Rocky Donohue MD [place]: [facility]: ADM FAX: Alberto Crowley- [phone] 
------------------------------------------------------------------------------- 
Name: LY FRANCIS [facility] : 1935 Age/S: 84/F [address] Unit 
#: [id number] Loc: [address] [place] [address] Phys: Rocky Donohue MD R1 Acct: 
[id number] Dis Date: Status: ADM IN  PHONE #: [phone] Exam Date: 
10/04/2020 0200 FAX #: [phone] Reason: fall, pain with ROM EXAMS:  CPT 
CODE: [id number] XR KNEE 1 OR 2 [place] BI [address] (Continued) Advanced osteoarthritic 
changes. No fracture.  ********************************************** AFTER 
HOURS SERVICE ON: 10/4/2020 3:29 AM Pelvis and Bilateral Hips, 5 Views Location 
Code M12 History: fall, pain with ROM Findings: There is osteopenia. There is an
impacted right femoral neck fracture. There is bilateral femoral acetabular 
joint space narrowing. Pubic symphysis and pubic rami are unremarkable. 
Impression:  Impacted mildly displaced right femoral neck fracture. ** 
Electronically Signed by Lindsey Gomez MD ** ** on 10/04/2020 at 0333  ** R
eported and signed by: Lindsey Gomez MD CC: Rocky Donohue MD; Alberto Salomon  Technologist: RT Ninoska (R) Trnscrd Date/Time/By: 
10/04/2020 (0333) : By: [email or web address] PAGE 3  Signed Report FAX: Rocky Donohue MD [place]:  St: [facility] FAX: Alberto Crowley [phone] -----------
-------------------------------------------------------------------- Name: 
LY [facility]ANCIS [facility] [place] [employer] 1935 Age/S: 84/F [address]  Unit #: 
[id number] Loc: [address] [place] [address] Phys: Rocky Donohue MD R1 Acct: 
[id number] Dis Date:  Status: ADM IN PHONE #: [phone] Exam Date: 10/
2020 0200 FAX #: [phone] Reason: fall, pain with ROM  EXAMS: CPT CODE: 
[id number] XR KNEE 1 OR 2 [place] [address] (Continued) Orig Print D/T: S: 10/04/2020 
(0336) PAGE 4 Signed Report- XR HIP BI W/PELVIS2020-10-04 03:33:00 FAX: 
Rocky Donohue MD R [place]: [facility]: [facility] FAX: Alberto Crowley- [phone] 
------------------------------------------------------------------------------- 
Name: LY FRANCIS [facility]  : 1935 Age/S: 84/F [address] Unit
#: [id number] Loc: [address] [place] [address] Phys: Rocky Donohue MD R1  Acct: 
[id number] Dis Date: Status: ADM IN PHONE #: [phone] Exam Date: 10/04/20
20 0200  FAX #: [phone] Reason: fall, pain with ROM EXAMS: CPT CODE: 
[id number] XR HIP BI W/PELVIS [address] AFTER HOURS SERVICE ON: 10/4/2020 3:29 AM 
Left Femur, 4 Views Location Code M12 History: fall, pain with ROM Findings: 
There is osteopenia. There is no fracture or dislocation. There is no periosteal
elevation. No lytic or blastic lesions.  Impression: Osteopenia. No fracture. 
**********************************************  AFTER HOURS SERVICE ON: 
10/4/2020 3:29 AM Right Femur, 4 Views Location Code M12 History: fall, pain 
with ROM Findings: There is an impacted mildly displaced femoral neckfracture. 
There is osteopenia. Femoral head is intact. Impression: Mildly displaced 
impacted right femoral neck fracture. 
********************************************** PAGE 1 Signed Report  (CONTINUED)
FAX: Rocky Donohue MD [place]: [facility]: [facility] FAX: Alberto Crowley PA- 
[phone] ----------------
--------------------------------------------------------------- Name: LY FRANCIS [facility] :1935 Age/S: 84/F [address] Unit #: [id number] 
Loc: [address] [address] Phys: Rocky Donohue MD R1 Acct: [id number] Dis
Date: Status: ADM IN  PHONE #: [phone] Exam Date: 10/04/2020 0200 FAX #: 
[phone] Reason: fall, pain with ROM EXAMS:  CPT CODE: [id number] XR HIP BI 
W/PELVIS [address] (Continued) AFTER HOURS SERVICE ON: 10/4/2020 3:29 AM Right Knee,
3 Views Location Code M12 History: fall, pain with ROM Findings: There is 
osteopenia. Advanced degenerative tricompartmental joint space narrowing is 
noted. There is no fracture. Femoral condyles and tibial plateaus are unremarkab
le.  Impression: Advanced osteoarthritic changes. No fracture. 
***********************************  AFTER HOURS SERVICE ON: 10/4/2020 3:29 AM 
Left Knee, 3 Views Location Code M12 History: fall, pain with ROM Findings: 
There is osteopenia. Advanced degenerative tricompartmental joint space 
narrowing isnoted. There is no fracture. Femoral condyles and tibial plateaus 
are unremarkable. Impression: PAGE2 Signed Report (CONTINUED) FAX: 
Rocky Donohue MD [place]:  St: ADM FAX: Alberto Crowley PA- [phone] 
------------------------------------------------------------------------------- 
Name: LY FRANCIS [facility] : 1935 Age/S: 84/F [address] Unit 
#: [id number] Loc: [address] [address] Phys: Rocky Donohue MD R1 Acct: 
[id number] Dis Date: Status: ADM IN PHONE #: [phone] Exam Date: 
10/04/2020 0200 FAX #: [phone] Reason: fall, pain with ROM EXAMS: CPT CODE:
[id number] XR HIP BI W/PELVIS [address] (Continued) Advanced osteoarthritic changes. 
No fracture. ********************************************** AFTER HOURS SERVICE 
ON: 10/4/2020 3:29 AM Pelvis and Bilateral Hips, 5 Views Location Code M12  
History: fall, pain with ROM Findings: There is osteopenia. There is an impacted
right femoral neck fracture. There is bilateral femoral acetabular joint space 
narrowing.Pubic symphysis and pubic rami are unremarkable. Impression: Impacted 
mildly displaced right femoralneck fracture. ** Electronically Signed by 
Lindsey Gomez MD ** ** on 10/04/2020 at 0333 ** Reported and signed by: 
Lindsey Gomez MD CC: Rocky Donohue MD; Alberto Salomon 
Technologist:RT Ninoska (R) Trnscrd Date/Time/By: 10/04/2020 (0333) : By:
[email or web address] PAGE 3 Signed Report FAX: Rocky Donohue MD [place]: [facility]: ADM FAX:
Alberto Crowley- [phone] ------------------
------------------------------------------------------------- Name: LY FRANCIS 
[facility] : 1935 Age/S: 84/F [address] Unit #: [id number] Loc:
[address] [address]  Phys: Rocky Donohue MD R1 Acct: [id number] Dis 
Date: Status: ADM IN PHONE #: [phone] Exam Date: 10/04/51240855 FAX #: 
[phone] Reason: fall, pain with ROM EXAMS:  CPT CODE: [id number] XR HIP BI 
W/PELVIS [address] (Continued) Orig Print D/T: S: 10/04/2020 (336)  PAGE 4 Signed 
Report- XR FEMUR MIN 2 VW RT2020-10-04 03:33:00 FAX: Rocky Donohue MD [place]:
[facility]: ADM FAX: Alberto Crowley [phone] ----------------
--------------------------------------------------------------- Name: LY FRANCIS [facility] [place] :1935 Age/S: 84/F [address] Unit #: [id number] 
Loc: [address] [address] Phys: Rocky Donohue MD R1 Acct: [id number] Dis
Date: Status: ADM IN PHONE #: [phone] Exam Date: 10/04/2020 0200 FAX #: 
[phone] Reason: fall, pain with ROM EXAMS: CPT CODE: [id number] XR FEMUR MIN
2 VW RT [address] AFTER HOURS SERVICE ON: 10/4/2020 3:29 AM Left Femur, 4 Views 
Location Code M12 History: fall, pain with ROM Findings: There is osteopenia. 
There is no fracture or dislocation. There is no periosteal elevation. No lytic 
or blastic lesions. Impression: Osteopenia. No fracture.  ******************
**************************** AFTER HOURS SERVICE ON: 10/4/2020 3:29 AM Right 
Femur, 4 Views LocationCode M12 History: fall, pain with ROM Findings: There is 
an impacted mildly displaced femoral neck fracture. There is osteopenia. Femoral
head is intact. Impression: Mildly displaced impacted right femoral neck 
fracture.  ********************************************** PAGE 1 Signed Report 
(CONTINUED) FAX: Rocky Donohue MD  [place]:  St: [facility] FAX: Alberto Crowley- [phone] -----------------
-------------------------------------------------------------- [place] : 1935 Age/S: 84/F [address] Unit #: [id number] Loc:
[address] [address] Phys: Rocky Donohue MD R1 Acct: [id number] Dis 
Date: Status: ADM IN PHONE #: [phone] Exam Date: 10/04/91300387 FAX #: 
[phone] Reason: fall, pain with ROM EXAMS: CPT CODE: [id number] XR FEMUR MIN
2 VW [id number] (Continued) AFTER HOURS SERVICE ON: 10/4/2020 3:29 AM Right Knee, 3
Views  Location Code M12 History: fall, pain with ROM Findings: There is 
osteopenia. Advanced degenerative tricompartmental joint space narrowing is 
noted. There is no fracture. Femoral condyles and tibial plateaus are unremarkab
le. Impression: Advanced osteoarthritic changes. No fracture. 
*********************************** AFTER HOURS SERVICE ON: 10/4/2020 3:29 AM  
Left Knee, 3 Views Location Code M12 History: fall, pain with ROM Findings: 
There is osteopenia. Advanced degenerative tricompartmental joint space 
narrowing is noted. There is no fracture. Femoral condyles and tibial plateaus 
are unremarkable. Impression: PAGE 2 Signed Report (CONTINUED) FAX: 
Rocky Donohue MD [place]: [facility]: [facility] FAX: Alberto Crowley- [phone] 
------------------------------------------------------------------------------- 
Name: LY FRANCIS [facility] [place] : 1935 Age/S: 84/F [address] 
#: [id number] Loc: C.ST[address] [place] [address] Phys: Rocky Donohue MD R1 Acct: 
[id number] Dis Date: Status: ADM IN PHONE #: [phone] Exam Date: 
10/04/2020 0200 FAX #: [phone] Reason: fall, pain with ROM EXAMS: CPT CODE:
[id number] XR FEMUR MIN 2 VW RT 10958 (Continued)  Advanced osteoarthritic 
changes. No fracture. ********************************************** AFTER HOURS
SERVICE ON: 10/4/2020 3:29 AM Pelvis and BilateralHips, 5 Views Location Code 
M12 History: fall, pain with ROM Findings: There is osteopenia. There isan 
impacted right femoral neck fracture. There is bilateral femoral acetabular 
joint space narrowing. Pubic symphysis and pubic rami are unremarkable. 
Impression: Impacted mildly displaced right femoral neck fracture. ** 
Electronically Signed by Lindsey Gomez MD ** ** on 10/04/2020 at 0333 ** 
Reported and signed by: Lindsey Gomez MD CC: Rocky Donohue MD; Alberto Salomon Technologist: Janneth MunozRT (R) Trnscrd Date/Time/By: 10/04/2020
(0333) : By: [email or web address] PAGE 3 Signed ReportFAX: Rocky Donohue MD R [place]: [facility]: [facility] FAX: Alberto Crowley [phone] ----------------
--------------------------------------------------------------- Name: LY FRANCIS [facility]  : 1935 Age/S: 84/F [address] Unit #: [id number] 
[facility] [place] [address] Phys: Rocky Donohue MD R1  Acct: [id number] 
Dis Date: Status: ADM IN PHONE #: [phone] Exam Date: 10/04/2020 020 FAX #:
[phone] Reason: fall, pain with ROM EXAMS: CPT CODE: [id number] XR FEMUR MIN
2 VWRT  15764 (Continued) Orig Print D/T: S: 10/04/2020 (0336) PAGE 4  Signed 
Report- XR FEMUR MIN 2 VW LT2020-10-04 03:33:00 FAX: Rocky Donohue MD R [place]:
[facility]: [facility] FAX: Alberto Crowley PA- [phone] ----------------
--------------------------------------------------------------- Name: LY FRANCIS [facility] :1935 Age/S: 84/F [address] Unit #: [id number] 
Loc: [address] [address] Phys: Rocky Donohue MD R1 Acct: [id number] 
Dis Date: Status: ADM IN  PHONE #: [phone] Exam Date: 10/04/2020 0200 FAX 
#: [phone] Reason: fall, pain with ROM EXAMS:  CPT CODE: [id number] XR FEMUR
MIN 2 VW LT [address]697 AFTER HOURS SERVICE ON: 10/4/2020 3:29 AM Left Femur, 4 Views 
Location Code M12 History: fall, pain with ROM Findings: There is osteopenia. 
There is no fracture or dislocation. There is no periosteal elevation. No lytic 
or blastic lesions. Impression: Osteopenia. No fracture. ***************
******************************* AFTER HOURS SERVICE ON: 10/4/2020 3:29 AM Right 
Femur, 4 Views Location Code M12  History: fall, pain with ROM Findings: There 
is an impacted mildly displaced femoral neck fracture. There is osteopenia. 
Femoral head is intact. Impression: Mildly displaced impacted rightfemoral neck 
fracture. ********************************************** PAGE 1 Signed Report 
(CONTINUED) FAX: Rocky Donohue MD [place]: [facility]: [facility] FAX: Alberto Crowley 
[place]- [phone] ---------------
---------------------------------------------------------------- Name: LY FRANCIS [facility] : 1935 Age/S: 84/F  [address] Unit #: [id number] 
Loc: [address] [address] Phys: Rocky Donohue MD R1 Acct: [id number] Dis
Date: Status: ADM IN PHONE #: [phone] Exam Date: 10/04/2020 0200 FAX #: 
[phone] Reason: fall, pain with ROM EXAMS: CPT CODE: [id number] XR FEMUR MIN
2 VWLT 79439 (Continued) AFTER HOURS SERVICE ON: 10/4/2020 3:29 AM Right Knee, 3
Views Location Code [id number]: fall, pain with ROM Findings: There is 
osteopenia. Advanced degenerative tricompartmental joint space narrowing is 
noted. There is no fracture. Femoral condyles and tibial plateaus are unremark
able. Impression: Advanced osteoarthritic changes. No fracture.  
***********************************AFTER HOURS SERVICE ON: 10/4/2020 3:29 AM 
Left Knee, 3 Views Location Code M12  History: fall, pain with ROM Findings: 
There is osteopenia. Advanced degenerative tricompartmental joint space 
narrowing is noted. There is no fracture. Femoral condyles and tibial plateaus 
are unremarkable. Impression: PAGE 2 Signed Report (CONTINUED) FAX: 
Rocky Donouhe MD [place]:  St: [facility] FAX: Alberto Crowley [phone] 
------------------------------------------------------------------------------- 
Name: LY FRANCIS [facility] : 1935 Age/S: 84/F [address] 
#: [id number] Loc: [address] [address] Phys: Rocky Donohue MD R1 Acct: 
[id number] Dis Date: Status: ADM IN  PHONE #: [phone] Exam Date: 
10/04/2020 0200 FAX #: [phone] Reason: fall, pain with ROM EXAMS:  CPT 
CODE: [id number] XR FEMUR MIN 2 VW LT [address] (Continued) Advanced osteoarthritic 
changes. No fracture.  ********************************************** AFTER 
HOURS SERVICE ON: 10/4/2020 3:29 AM Pelvis and Bilateral Hips, 5 Views Location 
Code M12 History: fall, pain with ROM Findings: There is osteopenia. There is an
impacted right femoral neck fracture. There is bilateral femoral acetabular 
joint space narrowing. Pubic symphysis and pubic rami are unremarkable. 
Impression:  Impacted mildly displaced right femoral neck fracture. ** 
Electronically Signed by Lindsey Gomez MD ** ** on 10/04/2020 at 0333  ** 
Reported and signed by: Lindsey Gomez MD CC: Rocky Donohue MD; Alberto Salomon  Technologist: Janneth MunozRT (R) Trnscrd Date/Time/By: 
10/04/2020 (0333) : By: [email or web address] PAGE 3  Signed Report FAX: Rocky Donohue MD [place]:  St: ADM FAX: Alberto Crowley- [phone] --------
----------------------------------------------------------------------- Name: 
LY FRANCIS [facility] : 1935 Age/S: 84/F [address]  Unit #: 
[id number] Loc: [address] [address] Phys: Rocky Donohue MD R1 Acct: 
[id number] Dis Date:  Status: ADM IN PHONE #: [phone] Exam Date:
10/04/2020 0200 FAX #: [phone] Reason: fall, pain with ROM  EXAMS: CPT 
CODE: [id number] XR FEMURMIN 2 VW LT [address]70 (Continued) Orig Print D/T: S: 
10/04/2020 (0336) PAGE 4 Signed ReportLIPID PROFILE (CORONARY RISK)2020-10-04 
02:23:00





             Test Item    Value        Reference Range Interpretation Comments

 

             TRIGLYCERIDES (test 113 mg/dL                              TRIGLYCE

RIDES



             code = TRIG)                                        REFERENCE



                                                                 RANGE:Normal: <

150



                                                                 mg/dLBorderline



                                                                 High: 150-199



                                                                 mg/dLHigh: 200-

499



                                                                 mg/dLVery High:



                                                                 >=500 mg/dL

 

             CHOLESTEROL (test 210 mg/dL                              CHOLESTERO

L



             code = CHOL)                                        REFERENCE



                                                                 RANGE:DESIRABLE

: <



                                                                 200 mg/dLBORDER

LINE:



                                                                 200-239 mg/dLHI

GH:



                                                                 >=240 mg/dL

 

             HDL CHOLESTEROL (test 70 mg/dL     40-59        H            



             code = HDL)                                         

 

             LIPOPROTEIN LDL (test 135.47 mg/dL 32-99        H            



             code = LDLC)                                        

 

             CORONARY RISK FACTOR 3.00                                    CHOL/H

DL RISK MALE:



             (test code = RISK)                                        1/2 AVG 3

.43 FEMALE:



                                                                 1/2 AVG 3.27 AV

G



                                                                 4.97 AVG 4.44 2

X AVG



                                                                 9.55 2X AVG 7.0

5 3X



                                                                 AVG 23.39 3X AV

G



                                                                 11.04~~~~~~~~~~

~~~~~



                                                                 ~~~~~~~~~~~~~~~

~~~~~



                                                                 ~~~~~~~~~~~~~~~

~~~~~



                                                                 ~~~~~National



                                                                 Cholesterol



                                                                 Education (NCEP

)



                                                                 Guidelines:~~~~

~~~~~



                                                                 ~~~~~~~~~~~~~~~

~~~~~



                                                                 ~~~~~~~~~~~~~~~

~~~~~



                                                                 ~~~~~~~~~~~ **H

DL



                                                                 Cholesterol**<4

0mg/d



                                                                 L: HDL Choleste

rol



                                                                 (Major risk fac

tor



                                                                 for CHD)>60mg/d

L:



                                                                 HDL Cholesterol



                                                                 (Negative risk



                                                                 factor for



                                                                 CHD)40-59mg/dL:



                                                                 Borderline Risk



                                                                 **LDL



                                                                 Cholesterol**<1

00mg/



                                                                 dL: Desirable L

DL-C



                                                                 [id number]

0-159



                                                                 mg/dL: Borderli

ne



                                                                 High Risk LDL-C



                                                                 [id number]

0-189



                                                                 mg/dL: High ris

k



                                                                 LDL-C concentra

tion



                                                                 HDL-LDL Cholest

almaz



                                                                 is affected by 

a



                                                                 number of facto

rs



                                                                 suchas smoking,

 age



                                                                 and



                                                                 sex.~~~~~~~~~~~

~~~~~



                                                                 ~~~~~~~~~~~~~~~

~~~~~



                                                                 ~~~~~~~~~~~~~~~

~~~~~



                                                                 ~~~~



LIPID PROFILE (CORONARY RISK)2020-10-04 02:11:00





             Test Item    Value        Reference Range Interpretation Comments

 

             TRIGLYCERIDES (test 113 mg/dL                              TRIGLYCE

RIDES



             code = TRIG)                                        REFERENCE



                                                                 RANGE:Normal: <

150



                                                                 mg/dLBorderline

 High:



                                                                 150-199 mg/dLHi

gh:



                                                                 200-499 mg/dLVe

ry



                                                                 High: >=500 mg/

dL

 

             CHOLESTEROL (test code 210 mg/dL                              TOSHA

STEROL REFERENCE



             = CHOL)                                             RANGE:DESIRABLE

: < 200



                                                                 mg/dLBORDERLINE

:



                                                                 200-239 mg/dLHI

GH:



                                                                 >=240 mg/dL

 

             HDL CHOLESTEROL (test 70 mg/dL     40-59        H            



             code = HDL)                                         

 

             LIPOPROTEIN LDL (test  mg/dL       32-99                     



             code = LDLC)                                        

 

             CORONARY RISK FACTOR 3.00                                    CHOL/H

DL RISK MALE:



             (test code = RISK)                                        1/2 AVG 3

.43 FEMALE:



                                                                 1/2 AVG 3.27 AV

G 4.97



                                                                 AVG 4.44 2X AVG

 9.55



                                                                 2X AVG 7.05 3X 

AVG



                                                                 23.39 3X AVG



                                                                 11.04~~~~~~~~~~

~~~~~~~



                                                                 ~~~~~~~~~~~~~~~

~~~~~~~



                                                                 ~~~~~~~~~~~~~~~

~~~~~~N



                                                                 ational Cholest

almaz



                                                                 Education (NCEP

)



                                                                 Guidelines:~~~~

~~~~~~~



                                                                 ~~~~~~~~~~~~~~~

~~~~~~~



                                                                 ~~~~~~~~~~~~~~~

~~~~~~~



                                                                 ~~~~~ **HDL



                                                                 Cholesterol**<4

0mg/dL:



                                                                 HDL Cholesterol

 (Major



                                                                 risk factor for



                                                                 CHD)>60mg/dL: H

DL



                                                                 Cholesterol (Ne

gative



                                                                 risk factor for



                                                                 CHD)40-59mg/dL:



                                                                 Borderline Risk

 **LDL



                                                                 Cholesterol**<1

00mg/dL



                                                                 : Desirable LDL

-C



                                                                 kbpcdjmfcqyhq87

0-159mg



                                                                 /dL: Borderline

 High



                                                                 Risk LDL-C



                                                                 gyamogbrswxap09

0-189mg



                                                                 /dL: High risk 

LDL-C



                                                                 concentration H

DL-LDL



                                                                 Cholesterol is



                                                                 affected by a n

umber



                                                                 of factors such

as



                                                                 smoking, age an

d



                                                                 sex.~~~~~~~~~~~

~~~~~~~



                                                                 ~~~~~~~~~~~~~~~

~~~~~~~



                                                                 ~~~~~~~~~~~~~~~

~~~~~



- CT HEAD/BRAIN W/O CONT2020-10-04 00:44:00 FAX: Alberto Crowley- 
[phone] [place]:  St: REG------------------------------------------
------------------------------------- Name: LY FRANCIS  : 
1935 Age/S: 84/F [address] Unit: [id number] Loc: [place] 
[address] Phys: Lexie Manzanares MD  Acct: [id number] Dis Date: Status: REG ER 
PHONE #: [phone] Exam Date: 10/04/2020 0013  FAX #: [phone]: 
fall EXAMS: CPT CODE: [id number] CT HEAD/BRAIN W/O CONT 65840 Location: H3 CT 
head, conductedon 10/04/20 COMPARISON EXAMS: None of the brain TECHNIQUE: CT 
examination of the brain was performedwithout contrast on a helical scanner. 
Scanning conducted from skull base through the vertex in the axial plane 
acquiring contiguous 5mm slice thickness .  The examination was performed on 
updated helical CT scanner utilizing low-dose radiation technique. Automatic 
exposure control timing was utilizedto minimize radiation dose.  CLINICAL 
HISTORY: Trauma, headache. Patient presenting to the emergencyroom. Patient 
status post fall FINDINGS: No positive mass-effect, midline shift, extra-axial 
fluid collections or intracranial hemorrhages seen. In particular, no 
subarachnoid hemorrhage is identified.No intra or extra-axial masses. No skull 
fracture is seen. The globes are intact. No acute territorial infarction is 
seen. No cerebral edema is seen. No significant sinus disease is seen. No 
pneumocephalus is seen or definite acute  traumatic injury. There is atrophy age
appropriate with significant moderate degree at least of relatively chronic-
appearing microvascular changes. IMPRESSION: No acute finding ** Electronically 
Signed by Hayley Lipscomb MD ** ** on 10/04/2020 at 0044 **  Reportedand 
signed by: Hayley Lipscomb MD CC: Alberto Salomon  Technologist: 
Aline Ramírez; ALICIA ZEE Trnscrd Dt/Tm: 10/04/2020 (0044) [email or web address] Orig
Print D/T: S: 10/04/2020 (0047  PAGE 1Signed Report- CT C-SPINE W/O CONT
2020-10-04 00:41:00 FAX: OCTAVIO ClaudioJayuyaAlberto oliver JOHN PA- [phone] [place]:  St: 
REG------------------------------------------
------------------------------------- Name: LY FRANCIS [facility] [place] : 
1935 Age/S: 84/F [address] Unit: [id number] Loc: [place] 
[address]  Phys: Lexie Manzanares MD Acct: [id number] Dis Date: Status: REG ER 
PHONE #: [phone] Exam Date: 10/04/2020 0013 FAX #: [phone] Reason: 
fall EXAMS:  CPT CODE: [id number] CT C-SPINE W/O CONT 85515 Location:  CT 
cervical spine, conducted on 10/04/20 TECHNIQUE: CT examination of the cervical 
spine without contrast was performed on a helical scanner. 2-D imaging was 
acquired using MPR software on CT workstation by the CT technologist. Scanning 
conducted in axial plane from skull base down to upper thoracic spine with 
acquisition of 2.5 mm contiguous axial slice thickness acquired . The 
examination was performed with low radiationdose technique. Automatic exposure 
control was utilized to reduce radiation dose. Examination conducted on updated 
helical CT scanner CLINICAL HISTORY: Neck pain. Trauma , patient presenting to 
the emergency room FINDINGS: There is no acute fracture or subluxation. There is
postoperative change with decompressive procedure identified from C3-C4 are 
downwards to C6-C7. Partial fusion of this spaces atC3-C4 downwards to at least 
C5-C6 and osseous fusion masses identified involving cervical facet joints. 
Cervical spondylosis is seen with multilevel foraminal narrowing but without 
compromise of the canal centrally. Assessment of the skull base unremarkable. 
Prevertebral soft tissues unremarkable. Theatlano axial joint is unremarkable . 
No pneumothorax or rib fracture is seen IMPRESSION: No acute fracture or 
traumatic malalignment of the spinal lines Postop changes Multilevel foraminal 
narrowing due to spondylosis Cervical spondylosis  ** Electronically Signed by 
Hayley Lipscomb MD ** ** on10/2020 at 0041 ** Reported and signed by: 
Hayley Lipscomb MD PAGE 1 Signed Report (CONTINUED) FAX: Alberto Crowley- [phone] [place]:  St: REG--------------------------------------
----------------------------------------- Name: LY FRANCIS [facility] [place] : 
1935 Age/S: 84/F [address] Unit: [id number] Loc: [place] 
[address] Phys: Lexie Manzanares MD Acct: [id number] Dis Date: Status: REG ER 
PHONE #: [phone] Exam Date: 10/04/2020 0013 FAX #: [phone] Reason: 
fall  EXAMS: CPT CODE: [id number] CT C-SPINE W/O CONT [address]3441 (Continued)  CC: 
Alberto Salomon Technologist: Aline ZEE Trnscrd 
Dt/Tm: 10/04/2020 (0041) [email or web address] Orig Print D/T: S: 10/04/2020 (0044 PAGE 2  
Signed ReportCOMPREHENSIVE METABOLIC PANEL2020-10-03 23:50:00





             Test Item    Value        Reference Range Interpretation Comments

 

             SODIUM (test code = 138 mmol/L   137-145      N            



             NA)                                                 

 

             POTASSIUM (test code 4.9 mmol/L   3.4-5.0      N            



             = K)                                                

 

             CHLORIDE (test code 110 mmol/L          H            



             = CL)                                               

 

             CARBON DIOXIDE (test 20 mmol/L    22-30        L            



             code = CO2)                                         

 

             GLUCOSE (test code = 117 mg/dL           H            



             GLU)                                                

 

             BLOOD UREA NITROGEN 18 mg/dL     7-17         H            



             (test code = BUN)                                        

 

             GLOMERULAR   101          >60                       The estimated



             FILTRATION RATE                                        glomerular f

iltration



             (test code = GFR)                                        rate is co

mputed



                                                                 usingpatient ra

ce, age



                                                                 (>18), sex, and

 serum



                                                                 creatinine. If 

anyof



                                                                 the needed data



                                                                 elements are mi

ssing



                                                                 the Laboratory 

cannot



                                                                 compute an amanda

mation



                                                                 of the glomerul

ar



                                                                 filtration rate

.

 

             CREATININE (test 0.6 mg/dL    0.5-1.0      N            



             code = CREAT)                                        

 

             TOTAL PROTEIN (test 7.1 g/dL     6.3-8.2      N            



             code = PROT)                                        

 

             ALBUMIN (test code = 3.9 g/dL     3.5-5.0      N            



             ALB)                                                

 

             CALCIUM (test code = 8.6 mg/dL    8.4-10.2     N            



             CA)                                                 

 

             BILIRUBIN TOTAL 0.9 mg/dL    0.2-1.3      N            "A positive 

bias may



             (test code = BILT)                                        occur for

 patients



                                                                 taking Eltrombo

pag(a



                                                                 bone marrow sti

mulant



                                                                 used to treat



                                                                 thrombocytopeni

a



                                                                 andaplastic ane

maicol)."

 

             BILIRUBIN CONJUGATED 0 mg/dL      0-0.3        N            "A posi

tive bias may



             (test code = BILCON)                                        occur f

or patients



                                                                 taking Eltrombo

pag(a



                                                                 bone marrow sti

mulant



                                                                 used to treat



                                                                 thrombocytopeni

a



                                                                 andaplastic ane

maicol)."



                                                                 ***************

********



                                                                 ***************

********



                                                                 **************C

ONJUGATE



                                                                 D BILIRUBIN IS 

THE



                                                                 REPLACEMENT ASS

AY FOR



                                                                 DIRECTBILIRUBIN

.*******



                                                                 ***************

********



                                                                 ***************

********



                                                                 *******

 

             BILIRUBIN    0.4 mg/dL    0-1.1        N            



             UNCONJUGATED (test                                        



             code = BILUNC)                                        

 

             SGOT/AST (test code 47 U/L       15-46        H            



             = AST)                                              

 

             SGPT/ALT (test code 20 U/L       0-34         N            



             = ALT)                                              

 

             ALKALINE PHOSPHATASE 50 U/L              N            



             (test code = ALKP)                                        



ALCOHOL2020-10- 23:50:00





             Test Item    Value        Reference Range Interpretation Comments

 

             ALCOHOL (test code = < 10 mg/dL   <10                        ~~~~~~

~~~~~~~~~~~~~~~



             ALC)                                                ~~~~~~~~~~~~~~~

~~~~~~~



                                                                 ~~~~~~~ RESULTS

 ARE TO



                                                                 BE USED FOR MED

ICAL



                                                                 PURPOSES ONLY.F

OR



                                                                 LEGAL PURPOSES 

THE



                                                                 SPECIMEN MUST B

E



                                                                 COLLECTED BY A 

CHAINOF



                                                                 CUSTODY. LEGAL 

TESTING



                                                                 IS NOT PERFORME

D BY



                                                                 THIS FACILITY.



                                                                 ~~~~~~~~~~~~~~~

~~~~~~~



                                                                 ~~~~~~~~~~~~~~~

~~~~~~~



                                                                 ~~~~~~



PROTHROMBIN TIME2020-10-03 23:43:00





             Test Item    Value        Reference Range Interpretation Comments

 

             PROTHROMBIN TIME 11.3 SECONDS 9.2-12.1     N            



             PATIENT (test code =                                        



             PTP)                                                

 

             INTERNATIONAL NORMAL 1.0                                    The INR

 is to be used



             RATIO (test code =                                        only for 

monitoring



             INR)                                                ORAL



                                                                 ANTICOAGULANTTH

ERAPY.



                                                                 Indication INR 

Value1.



                                                                 Prophylaxis/kaushik

atment



                                                                 of: Venous Thro

mbosis,



                                                                 Pulmonary Embol

ism 2.0



                                                                 - 3.02. Prevent

ion of



                                                                 systemic emboli

sm



                                                                 from: Tissue he

art



                                                                 valves 2.0 - 3.

0



                                                                 Acute myocardia

l



                                                                 infarction (to 

present



                                                                 systemic emboli

sm)*



                                                                 2.0 - 3.0 Valvu

lar



                                                                 heart disease 2

.0 -



                                                                 3.0 Atrial



                                                                 fibrillation  2

.0 -



                                                                 3.03. Mechanica

l



                                                                 prosthetic valv

es



                                                                 (high risk) 2.5

 - 3.5



                                                                 * If oral



                                                                 anticoagulant t

herapy



                                                                 is elected to



                                                                 preventrecurren

t



                                                                 myocardial infa

rction,



                                                                 an INR of 2.5-3

.5



                                                                 isrecommended,



                                                                 consistent with

 Food



                                                                 and Drug



                                                                 Administrationr

ecommen



                                                                 dations.



THROMBOPLASTIN TIME PARTIAL2020-10-03 23:43:00





             Test Item    Value        Reference Range Interpretation Comments

 

             THROMBOPLASTIN TIME 18.9 SECONDS 23.4-37.0    L             Therape

utic Range



             PARTIAL (test code =                                        for Hep

broderick



             PTT)                                                EFFECTIVE 7/10/

13



                                                                 Heparin IU/mL a

PTT



                                                                 Seconds0.3 64.3

0.7



                                                                 88.8



- XR CHEST 1 -10-03 23:34:00 FAX: Alberto Crowley pa- [phone] 
[place]:  St: REG------------------------------------------
------------------------------------- Name: LY FRANCISwood : 
1935 Age/S: 84/F  [address] Unit #: [id number] Loc: [place] [address] Phys: Lexie Manzanares MD Acct: [id number] Dis Date: Status: REG ER 
PHONE #: [phone] Exam Date: 10/03/2020 2301 FAX #: [phone]: fall
 EXAMS: CPT CODE: [id number] XR CHEST 1 V [id number] AFTER HOURS SERVICE ON: 10/3/2020
11:34 PM AP Portable Chest Location Code M12 HISTORY: fall FINDINGS: There are 
extensive coarse chronic appearing bilateral interstitial markings. There are no
pleural effusions. There is no pneumothorax. Cardiac silhouette and mediastinum 
appear within normal limits.  IMPRESSION: No active pulmonary findings. ** 
Electronically Signed by Lindsey Gomez MD ** ** on 10/03/2020 at 7017  ** 
Reported and signed by: Lindsey Gomez MD CC: Alberto Salomon  
Technologist: Kiara Rodriges Trnscrd Date/Time/By: 10/03/2020 (9192) : By: 
[email or web address]  PAGE 1 Signed Report FAX: Alberto Crowley- [phone]: 
[address] St: 
REG-----------------------------------------------------------------------------

-- Name: LY FRANCISwood : 1935 Age/S: 84/F [address] 
Unit #: [id number] Loc: [place] [address] Phys: Lexie Manzanares MD 
Acct: [id number] Dis Date: Status: REG ER  PHONE #:[phone] Exam Date: 
10/03/2020 230 FAX #: [phone] Reason: fall EXAMS: CPT CODE: [id number] XR 
CHEST 1 V 68724 (Continued) Orig Print D/T: S: 10/03/2020 (0823)  PAGE 2 Signed 
Report- XR HIP W/PEL UNI 2+V RT2020-10-03 23:24:00 FAX: Alberto Crowley- 
[phone] [place]:  St: PRE------------------------------------------
------------------------------------- Name: LY FRANCIS [facility] : 
1935 Age/S: 84/F [address] Unit #: [id number] [place]: [place]
[address] Phys: Lexie Manzanares MD Acct: [id number] Dis Date: Status: PRE ER 
PHONE #: [phone] Exam Date: 10/03/2020 2301 FAX #: [phone] Reason: 
fall  EXAMS: CPT CODE: [id number] XR HIP W/PEL UNI 2+V RT 26057 AFTER HOURS 
SERVICE ON: 10/3/2020 11:23 PM Right Hip, 3 Views Location Code M12 History: 
fall Findings: There is an impacted right femoral neck fracture with 
approximately 1 severe displacement. There is osteopenia. There is narrowing of 
both femoral acetabular joint spaces. Visualized pubic rami are intact.  
Impression: Mildly displaced impacted right femoral neck fracture. ** 
Electronically Signed by Lindsey Gomez MD ** ** on 10/03/2020 at 9914 ** 
Reported and signed by: Lindsey Gomez MD CC: Alberto Salomon  Techno
logist: Kiara Rodriges Trnscrd Date/Time/By: 10/03/2020 (2324) : By: [email or web address] 
PAGE 1 Signed ReportFAX: Alberto Crowley- [phone] [place]:  St: 
PRE-------------------------------------------
------------------------------------ Name: LY FRANCIS : 
1935 Age/S: 84/F [address]  Unit #: [id number] Loc: [place] [address] Phys: Lexie Manzanares MD Acct: [id number] Dis Date: Status: PRE ER 
PHONE #: [phone] Exam Date: 10/03/2020 2301 FAX #: [phone] Reason: 
fall  EXAMS: CPT CODE: [id number] XR HIP W/PEL UNI 2+V RT 36740 (Continued) Orig 
Print D/T: S:10/03/2020 (1507) PAGE 2 Signed ReportCBC W/AUTO DIFF2020-10-03 
23:23:00





             Test Item    Value        Reference Range Interpretation Comments

 

             WHITE BLOOD CELL (test code = 10.3 x10 3/uL 5.0-12.0     N         

   



             WBC)                                                

 

             RED BLOOD CELL (test code = 4.12 x10 6/uL 4.20-5.40    L           

 



             RBC)                                                

 

             HEMOGLOBIN (test code = HGB) 12.9 g/dL    12.0-16.0    N           

 

 

             HEMATOCRIT (test code = HCT) 38.8 %       36.0-46.0    N           

 

 

             MEAN CELL VOLUME (test code = 94 fL        81-99        N          

  



             MCV)                                                

 

             MEAN CELL HGB (test code = MCH) 31.3 pg      27-31        H        

    

 

             MEAN CELL HGB CONCENTRATION 33.2 g/dL    33-37        N            



             (test code = MCHC)                                        

 

             RED CELL DISTRIBUTION WIDTH 11.9 %       11.5-15.5    N            



             (test code = RDW)                                        

 

             PLATELET COUNT (test code = 238 x10 3/uL 130-400      N            



             PLT)                                                

 

             MEAN PLATELET VOLUME (test code 9.0 fL       9.4-16.4     L        

    



             = MPV)                                              

 

             NEUTROPHIL % (test code = NT%) 78.9 %       43-65        H         

   

 

             IMMATURE GRANULOCYTE % (test 0.2 %        0.0-2.0      N           

 



             code = IG%)                                         

 

             LYMPHOCYTE % (test code = LY%) 14.9 %       20.5-45.5    L         

   

 

             MONOCYTE % (test code = MO%) 4.9 %        5.5-11.7     L           

 

 

             EOSINOPHIL % (test code = EO%) 0.9 %        0.9-2.9      N         

   

 

             BASOPHIL % (test code = BA%) 0.2 %        0.2-1.0      N           

 

 

             NUCLEATED RBC % (test code = 0.0 %        0-1.0        N           

 



             NRBC%)                                              

 

             NEUTROPHIL # (test code = NT#) 8.13 x10 3/uL 2.2-4.8      H        

    

 

             IMMATURE GRANULOCYTE # (test 0.02 x10 3/uL 0-0.03       N          

  



             code = IG#)                                         

 

             LYMPHOCYTE # (test code = LY#) 1.54 x10 3/uL 1.3-2.9      N        

    

 

             MONOCYTE # (test code = MO#) 0.51 x10 3/uL 0.3-0.8      N          

  

 

             EOSINOPHIL # (test code = EO#) 0.09 x10 3/uL 0.0-0.2      N        

    

 

             BASOPHIL # (test code = BA#) 0.02 x10 3/uL 0.0-0.1      N

## 2023-03-19 NOTE — ER
Nurse's Notes                                                                                     

 [facility]                                                                 

Name: Winter Kaur                                                                                   

Age: 87 yrs                                                                                       

Sex: Female                                                                                       

: 1935                                                                                   

MRN: [id number]                                                                                   

Arrival Date: 2023                                                                          

Time: 04:18                                                                                       

Account#: [id number]                                                                            

Bed 2                                                                                             

Private MD:                                                                                       

Diagnosis: SARS-associated coronavirus as the cause of diseases classified elsewhere;Shortness of 

  breath;Nondisplaced left distal radius fracture                                                 

                                                                                                  

Presentation:                                                                                     

                                                                                             

04:19 Chief complaint: EMS states:  EMS stated [facility] C/O patient having pf1 

      SOB,onset PTA. EMS stated patient was diagnosis with Covid-19,unknown on what day.          

      Patient C/O left wrist pain with palpation. Patient has a yellow bruising with swelling     

      noted to left wrist. Coronavirus screen:. Coronavirus screen: Client denies travel out      

      of the [place] in the last 14 days. Client presents with at least one sign or symptom that     

      may indicate coronavirus-19.  EMS stated patient was diagnosed with Covid-19 prior to     

      arrival, unknown what day. Ebola Screen: Patient negative for fever greater than or         

      equal to 101.5 degrees Fahrenheit, and additional compatible Ebola Virus Disease            

      symptoms. Initial Sepsis Screen: Does the patient meet any 2 criteria? No. Patient's        

      initial sepsis screen is negative. Does the patient have a suspected source of              

      infection? No. Patient's initial sepsis screen is negative. Risk Assessment: Do you         

      want to hurt yourself or someone else? Unable to obtain. Onset of symptoms is unknown.      

04:19 Method Of Arrival: EMS: [facility]                                                pf1 

04:19 Acuity: ADRIANE 3                                                                           pf1 

                                                                                                  

Triage Assessment:                                                                                

04:28 General: see nurse assessment.                                                          pf1 

                                                                                                  

Historical:                                                                                       

- Allergies:                                                                                      

04:27 No Known Allergies;                                                                     pf1 

- PMHx:                                                                                           

04:27 Chronic obstructive lung disease; Dementia; Gastroesophageal reflux disease;            pf1 

      Hypercholesterolemia; Hypertensive disorder; Hypothyroidism;                                

                                                                                                  

- Immunization history:: Adult Immunizations unknown, unknown Last tetanus                        

  immunization: unknown.                                                                          

- Social history:: Smoking status: unknown.                                                       

                                                                                                  

                                                                                                  

Screenin:31 [facility] ED Fall Risk Assessment (Adult) History of falling in the last 3 months,       pf1 

      including since admission No falls in past 3 months (0 pts) Confusion or Disorientation     

      Yes (5 pts) Intoxicated or Sedated No (0 pts) Impaired Gait Yes (1 pt) Mobility Assist      

      Device Used Yes (1 pt) Altered Elimination Yes (1 pt) Score/Fall Risk Level 3 or more       

      points = High Risk Oriented to surroundings, Maintained a safe environment, Educated pt     

      \T\ family on fall prevention, incl call for assistance when getting out of bed, Assessed   

      \T\ reinforced patient's understanding of fall precautions, Provided non-skid footwear,     

      Hourly rounding (assess needs \T\ fall precautionary measures) done, Used ambulatory aids   

      as needed (educated on \T\ assisted with), Used gait belt as appropriate Implemented a      

      Fall Risk Plan of Care, Apply high fall risk patient identification: yellow non skid        

      footwear/ fall signage, Remained w/in arm's length of patient and in sight while            

      toileting, Offered frequent toileting (1:1 observation), Remained with patient while        

      ambulating, Utilized family, sitter, or virtual  as indicated. Abuse screen:       

      Denies threats or abuse. Nutritional screening: No deficits noted. Tuberculosis             

      screening: No symptoms or risk factors identified.                                          

                                                                                                  

Assessment:                                                                                       

04:28 General: Appears in no apparent distress. comfortable, well groomed, well developed,    pf1 

      Behavior is calm, cooperative, appropriate for age, quiet. Pain: Complains of pain in       

      left wrist Pain Unable to use pain scale. Patient is disoriented. Neuro: Level of           

      Consciousness is awake, alert, obeys commands, Oriented to person. Cardiovascular: No       

      deficits noted. Capillary refill < 3 seconds Patient's skin is warm and dry.                

      Respiratory: Airway is patent Trachea midline Respiratory effort is even, unlabored,        

      Respiratory pattern is regular, symmetrical, Breath sounds are clear bilaterally.           

      Parent/caregiver reports the patient having Covid-19 positive per [facility] stated per         

      [facility]. GI: No deficits noted. No signs and/or symptoms were          

      reported involving the gastrointestinal system. : No deficits noted. No signs and/or      

      symptoms were reported regarding the genitourinary system. EENT: No deficits noted. No      

      signs and/or symptoms were reported regarding the EENT system. Derm: abrasion noted to      

      left elbow. Musculoskeletal: Capillary refill < 3 seconds, Swelling present in left         

      wrist yellow bruising to left wrist.                                                        

05:30 Reassessment: Patient appears in no apparent distress at this time. Patient and/or      pf1 

      family updated on plan of care and expected duration. Pain level reassessed. Patient        

      states symptoms have not improved. Patient has a cough at this time.                        

06:30 Reassessment: Patient appears in no apparent distress at this time. No changes from     pf1 

      previously documented assessment. Patient and/or family updated on plan of care and         

      expected duration. Pain level reassessed. Patient is alert, oriented x 3, equal             

      unlabored respirations, skin warm/dry/pink. Patient states feeling better. Patient          

      states symptoms have improved. .                                                            

07:14 Reassessment: Patient appears in no apparent distress at this time.                     hb  

07:27 Reassessment: awaiting imaging results.                                                 ss  

08:17 Reassessment: Pt resting with eyes closed in no apparent distress.                      hb  

10:00 Reassessment: Patient appears in no apparent distress at this time. No changes from     hb  

      previously documented assessment.                                                           

                                                                                                  

Vital Signs:                                                                                      

04:19  / 50; Pulse 90; Resp 20; Temp 100.4; Pulse Ox 97% on R/A; Weight 63.5 kg;        pf1 

05:15  / 58; Pulse 84; Resp 6; Pulse Ox 97% on R/A;                                     pf1 

05:15  / 53; Pulse 77; Resp 16; Pulse Ox 97% on R/A;                                    pf1 

06:30  / 62; Pulse 71; Resp 16; Pulse Ox 96% on R/A;                                    pf1 

07:14  / 50; Pulse 71; Resp 23; Pulse Ox 96% on R/A;                                    hb  

08:17  / 59; Pulse 96; Resp 18; Pulse Ox 95% on R/A;                                    hb  

08:48 Temp 98.5(O);                                                                           ss  

09:35  / 49; Pulse 66; Resp 18; Pulse Ox 96% on R/A; Pain 0/10;                         ss  

10:19  / 70; Pulse 73; Resp 20; Pulse Ox 97% ;                                          hb  

09:35 Pain Scale: Adult                                                                       ss  

                                                                                                  

ED Course:                                                                                        

04:18 Patient arrived in ED.                                                                  ms3 

04:19 Alberto Johns DO is Attending Physician.                                                ms3 

04:19 Little burkett, AVTAR is Primary Nurse.                                                    pf1 

04:27 Triage completed.                                                                       pf1 

04:28 Arm band placed on.                                                                     pf1 

04:32 No provider procedures requiring assistance completed. Maintain EMS IV. Dressing        pf1 

      intact. Good blood return noted. Site clean \T\ dry. Gauge \T\ site: 20 gauge to RFA.       

05:04 CXR XRAY In Process Unspecified.                                                        EDMS

05:04 Wrist Left (3 View) XRAY In Process Unspecified.                                        EDMS

07:11 Attending Physician role handed off by Alberto Johns DO                                 ms3 

07:11 Kenny Stock MD is Attending Physician.                                              ms3 

08:56 Orthoglass splint: Sugar tong splint applied on left arm. Sling applied to left arm.    ss  

                                                                                                  

Administered Medications:                                                                         

05:42 Drug: Acetaminophen  mg Route: PO;                                                pf1 

06:16 Follow up: Response: No adverse reaction; Marked relief of symptoms; Pain is decreased  pf1 

                                                                                                  

                                                                                                  

Outcome:                                                                                          

10:21 Discharge ordered by MD.                                                                kdr 

                                                                                                  

Signatures:                                                                                       

Dispatcher [employer]                           EDMS                                                 

Kenny Stock MD MD   [facility]                                                  

Mya Cisse RN RN                                                      

Makenzie Rodriguez RN RN                                                      

Alberto Johns DO DO   ms3                                                  

Little burkett RN                      RN   pf1                                                  

                                                                                                  

Corrections: (The following items were deleted from the chart)                                    

06:14 04:28 Respiratory: Airway is patent Trachea midline Respiratory effort is even,         pf1 

      unlabored, Respiratory pattern is regular, symmetrical, Breath sounds are clear             

      bilaterally. Parent/caregiver reports the patient having Covid-19 positive per [facility]       

      stated per [facility] pf1                                                 

06:47 06:30 Reassessment: Patient appears in no apparent distress at this time. No changes    pf1 

      from previously documented assessment. Patient and/or family updated on plan of care        

      and expected duration. Pain level reassessed. Patient is alert, oriented x 3, equal         

      unlabored respirations, skin warm/dry/pink. Patient states feeling better. Patient          

      states symptoms have improved. patient pending US results.. pf1                             

                                                                                                  

**************************************************************************************************

## 2023-03-20 NOTE — RAD REPORT
EXAM DESCRIPTION:  RAD - Chest Single View - 3/19/2023 5:02 am

 

TECHNIQUE:  Portable AP upright chest x-ray. Comparison: None.

 

CLINICAL HISTORY:  COUGH.

 

FINDINGS:  Heart size: Normal.

Lungs: Diffuse mild increased reticular pulmonary opacities likely from chronic lung disease but ther
e are no previous exams to determine the baseline. Acute interstitial edema not excluded in this sett
ing. No airspace consolidation. Air trapping is seen.

Pleura: No pleural effusion. No pneumothorax.

Mediastinum and anisha: Unremarkable.

Musculoskeletal: Posttraumatic deformity of the left humeral neck. Thoracic spondylosis.

Support tubings: None.

 

IMPRESSION:  1.   Findings consistent with COPD and chronic interstitial lung disease.

 

Electronically signed by:   Pola Tanner MD   3/19/2023 7:13 AM CDT Workstation: [id number]

 

 

 

Due to temporary technical issues with the PACS/Fluency reporting system, reports are being signed by
 the in house radiologists without review as a courtesy to insure prompt reporting. The interpreting 
radiologist is fully responsible for the content of the report.

## 2023-03-20 NOTE — RAD REPORT
EXAM DESCRIPTION:  RAD - Wrist Left 3 View - 3/19/2023 5:02 am

 

COMPARISON:  None.

 

CLINICAL HISTORY:  [facility] MAIN Pain;Swelling

 

FINDINGS:  3 views of the left wrist demonstrate a cortical irregularity of the distal radius. There 
are degenerative changes of the distal interphalangeal joints and first carpometacarpal joint.   Soft
 tissues are unremarkable.

 

IMPRESSION:  Osteoarthritis. Nondisplaced distal radial fracture.

 

Electronically signed by:   Ruben Batista MD   3/19/2023 7:03 AM CDT Workstation: [id number]

 

 

 

Due to temporary technical issues with the PACS/Fluency reporting system, reports are being signed by
 the in house radiologists without review as a courtesy to insure prompt reporting. The interpreting 
radiologist is fully responsible for the content of the report.

## 2023-03-20 NOTE — EKG
Test Date:    2023-03-19               Test Time:    09:04:11

Technician:   LAZ                                     

                                                     

MEASUREMENT RESULTS:                                       

Intervals:                                           

Rate:         66                                     

LA:           162                                    

QRSD:         68                                     

QT:           432                                    

QTc:          452                                    

Axis:                                                

P:            8                                      

LA:           162                                    

QRS:          8                                      

T:            15                                     

                                                     

INTERPRETIVE STATEMENTS:                                       

                                                     

Normal sinus rhythm

Normal ECG

No previous ECG available for comparison



Electronically Signed On 03-20-23 17:35:33 CDT by Ronny Reese